# Patient Record
Sex: FEMALE | Race: WHITE | NOT HISPANIC OR LATINO | Employment: OTHER | URBAN - METROPOLITAN AREA
[De-identification: names, ages, dates, MRNs, and addresses within clinical notes are randomized per-mention and may not be internally consistent; named-entity substitution may affect disease eponyms.]

---

## 2018-06-13 ENCOUNTER — HOSPITAL ENCOUNTER (OUTPATIENT)
Dept: PREADMISSION TESTING | Facility: OTHER | Age: 58
Discharge: HOME | End: 2018-06-13
Attending: ORTHOPAEDIC SURGERY
Payer: COMMERCIAL

## 2018-06-13 VITALS
BODY MASS INDEX: 31.65 KG/M2 | HEIGHT: 65 IN | SYSTOLIC BLOOD PRESSURE: 143 MMHG | DIASTOLIC BLOOD PRESSURE: 67 MMHG | TEMPERATURE: 98 F | WEIGHT: 190 LBS | OXYGEN SATURATION: 97 % | HEART RATE: 66 BPM

## 2018-06-13 RX ORDER — SODIUM CHLORIDE, SODIUM LACTATE, POTASSIUM CHLORIDE, CALCIUM CHLORIDE 600; 310; 30; 20 MG/100ML; MG/100ML; MG/100ML; MG/100ML
INJECTION, SOLUTION INTRAVENOUS CONTINUOUS
Status: CANCELLED | OUTPATIENT
Start: 2018-06-13

## 2018-06-13 RX ORDER — CEFDINIR 300 MG/1
600 CAPSULE ORAL DAILY
COMMUNITY
End: 2018-07-03 | Stop reason: ALTCHOICE

## 2018-06-13 RX ORDER — AZELASTINE 1 MG/ML
1 SPRAY, METERED NASAL 2 TIMES DAILY
COMMUNITY
End: 2018-07-20 | Stop reason: SDUPTHER

## 2018-06-13 RX ORDER — PREDNISONE 20 MG/1
20 TABLET ORAL DAILY
COMMUNITY
End: 2018-07-03

## 2018-06-13 RX ORDER — LIDOCAINE HYDROCHLORIDE 10 MG/ML
0.5 INJECTION, SOLUTION EPIDURAL; INFILTRATION; INTRACAUDAL; PERINEURAL ONCE
Status: CANCELLED | OUTPATIENT
Start: 2018-06-13 | End: 2018-06-13

## 2018-06-13 RX ORDER — FLUTICASONE FUROATE AND VILANTEROL 100; 25 UG/1; UG/1
1 POWDER RESPIRATORY (INHALATION) DAILY
COMMUNITY
End: 2018-08-13

## 2018-06-13 RX ORDER — MELOXICAM 15 MG/1
15 TABLET ORAL NIGHTLY
COMMUNITY
End: 2018-07-03

## 2018-06-13 RX ORDER — TRIAMCINOLONE ACETONIDE 55 UG/1
2 SPRAY, METERED NASAL NIGHTLY
COMMUNITY
End: 2019-01-22

## 2018-06-13 RX ORDER — PREGABALIN 75 MG/1
75 CAPSULE ORAL ONCE
Status: CANCELLED | OUTPATIENT
Start: 2018-06-13 | End: 2018-06-13

## 2018-06-13 RX ORDER — OMEPRAZOLE 40 MG/1
40 CAPSULE, DELAYED RELEASE ORAL DAILY
COMMUNITY
End: 2018-07-03

## 2018-06-13 RX ORDER — MONTELUKAST SODIUM 10 MG/1
10 TABLET ORAL NIGHTLY
COMMUNITY
End: 2018-08-13

## 2018-06-13 NOTE — PRE ADMISSION SCREENING
Pt reports went to urgent care over weekend with productive cough and shortness of breath.  Has been on antibiotic, prednisone and was unable to lie flat last night secondary to shortness of breath.  Dr. Elliott, anesthesia, aware.  Recommend patient see her pulmonologist and postpone surgery.  Message left on Dr. Stone' nurse voicemail informing of above.

## 2018-06-13 NOTE — DISCHARGE INSTRUCTIONS
PRE-ADMIT TESTING -  [phone]    [address]  [place] [facility]          Your surgery has been scheduled at [facility]. We are pleased to have the opportunity to serve you. For Further Information please call [phone].    On the day of surgery please report to the Information Desk on the 1st floor.    · CONTACT YOUR PHYSICIAN'S OFFICE THE DAY PRIOR TO YOUR SURGERY TO OBTAIN YOUR ARRIVAL TIME.     · The evening before surgery do not eat anything after 9 p.m. ( this includes hard candy, chewing gum and mints).  You may only have GATORADE, POWERADE AND WATER  from 9 p.m. until you leave your home.   DO NOT DRINK ANY LIQUIDS ON THE WAY TO THE HOSPITAL.      SPECIAL MEDICATION INSTRUCTIONS: TAKE medications checked off by the Anesthesiologist on your Medication List.    Angiogram Patients: Take medications as instructed by your physician, including aspirin.     Surgery Patients:    If you take ASPIRIN - Your PHYSICIAN/SURGEON will need to inform you IF/OR when you need to stop taking aspirin prior to your surgery.     Do Not take any medications containing IBUPROFEN.  Do Not Wear any make-up or dark nail polish   (especially eye make-up) to surgery. If you come to surgery with makeup on you will be required to remove the makeup or nail polish.    Do not shave your surgical area at least 5 days prior to your surgery. The surgical prep will be performed at the hospital according to Infection Control regulations.    Leave all valuables at home.   Do Not wear any jewelry or watches, including any metal in body piercings.  Contact Lens must be removed before surgery. Either do not wear the contact lens or bring a case and solution for storage.  Please bring a container for eyeglasses or dentures as required.  Bring any paperwork your physician has provided, such as consent forms,  history and physicals, doctor's orders, etc.   Bring comfortable clothes that are loose fitting to wear upon  discharge. Take into consideration the type of surgery being performed.  Maintain your diet as advised per your physician the day prior to surgery.      Adequate rest the night before surgery is advised.   Park in the Parking lot behind the hospital or in the [facility] across the street from the parking lot. Parking is complimentary.  If you will be discharged the same day as your procedure, please arrange for a responsible adult to drive you home or to accompany you if traveling by taxi.   YOU WILL NOT BE PERMITTED TO DRIVE OR TO LEAVE THE HOSPITAL ALONE AFTER SURGERY.   It is strongly recommended that you arrange for someone to remain with you for the first 24 hrs following your surgery.       Thank you for your cooperation.  The Staff of [facility].        Bathing Instructions                                                                 Please shower the evening before and morning of your procedure with    ANTIBACTERIAL SOAP. ( DIAL, etc )  Concentrate on the surgical area   for at least 3 minutes and rinse completely. Dry off as usual.   Do not use any deodorant, powder, body lotions, perfume, after shave or    cologne.

## 2018-07-03 ENCOUNTER — OFFICE VISIT (OUTPATIENT)
Dept: ALLERGY | Facility: CLINIC | Age: 58
End: 2018-07-03
Payer: COMMERCIAL

## 2018-07-03 VITALS
BODY MASS INDEX: 34.46 KG/M2 | SYSTOLIC BLOOD PRESSURE: 122 MMHG | HEIGHT: 65 IN | WEIGHT: 206.81 LBS | DIASTOLIC BLOOD PRESSURE: 70 MMHG

## 2018-07-03 DIAGNOSIS — R49.0 HOARSENESS: ICD-10-CM

## 2018-07-03 DIAGNOSIS — R06.00 DYSPNEA AND RESPIRATORY ABNORMALITIES: Primary | ICD-10-CM

## 2018-07-03 DIAGNOSIS — R06.89 DYSPNEA AND RESPIRATORY ABNORMALITIES: Primary | ICD-10-CM

## 2018-07-03 DIAGNOSIS — J31.0 CHRONIC RHINITIS: ICD-10-CM

## 2018-07-03 PROCEDURE — 99244 OFF/OP CNSLTJ NEW/EST MOD 40: CPT | Mod: ,,, | Performed by: ALLERGY & IMMUNOLOGY

## 2018-07-03 RX ORDER — PANTOPRAZOLE SODIUM 40 MG/1
40 TABLET, DELAYED RELEASE ORAL DAILY
COMMUNITY
End: 2018-07-12 | Stop reason: SDUPTHER

## 2018-07-03 NOTE — LETTER
July 3, 2018        Tram Bill MD  [address]  [address]  [place] [address]             [facility]  [address]  [address]  [place] [address]  Phone: [phone]  Fax: [phone]   Patient: Valdez Salazar   MR Number: [id number]   YOB: 1960   Date of Visit: 7/3/2018       Dear Dr. Bill:    Thank you for referring Valdez Salazar to me for evaluation. Attached you will find relevant portions of my assessment and plan of care.    If you have questions, please do not hesitate to call me. I look forward to following Valdez Salazar along with you.    Sincerely,      Arabella Kelly MD            CC  No Recipients    Enclosure

## 2018-07-03 NOTE — PROGRESS NOTES
"Subjective:       Patient ID: Valdez Salazar is a 58 y.o. female.    Chief Complaint: Sinus Problem (mucus in her throat, lost her voice about 4 weeks ago, and it has not come back yet) and Cough (coughs up mucus that is clear to white and thick)    HPI       Pt presents as a consult from Dr. Bill for possible allergic rhinitis etiology contributing towards cough and mucus production.     She is seeing Dr. Hart and Dr. Farrar as well.     She tried omeprazole 40 mg BID, but Dr. farrar took her off this regimen.   He placed her on protonix 40 mg once per day. Thus far, no improvement.     Dr. Hart performed scope and dx with LPR and sent her to GI, Dr. Farrar. No vocal cord lesions at that time.     Her main symptoms are coughing spells  They can be bad enough to where she thought she may go to the ED, last bad episode was Saturday  Rescue inhaler may help may not , not a consistent efficacy.   She has been having these symptoms for the past year.   Her voice hoarseness has been 4 weeks.   She thinks it started around the same time as when she started the breo.   She has to sound like "minni mouse"  To get sound out.   Since she's been out of [facility], her symptoms have been bad.   mucinex helps with thickness of mucus.   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     She has been on an antibiotic but isn't unsure of the name or type.   - early June. It helped the "color " of the mucus but hasn't had improvement in sx.     Ct chest reviewed and no significant findings.   Sinus ct in 2015 reviewed and no significant findings.      Pt isn't sure if source of mucus is post nasal drip or not. Reflux medication hasn't been helping.     Pt is currently nasacort 2 sen qhs. Has rx for azelastine.     Would like to pursue skin prick testing.       Atopic history  Asthma: no history - but had a methacholine challenge- per patient report positive.   - she is currently on breo 200 1 puff daily. " "  Clearing mucus helps her breathe.   Rhinitis: see above  Dysphagia: at times food gets stuck- see Dr. Marroquin for reflux  Food allergy: none   Oral allergy: none       Review of Systems      General: neg unexpected weight changes, fevers, chills, night sweats, malaise  HEENT: see hpi, Neg eye pain, vision changes, ear drainage, nose bleeds, throat tightness, sores in the mouth  CV: Neg chest pain, palpitations, swelling  Resp: see hpi, neg shortness of breath, hemoptysis  GI: see hpi, neg dysphagia, night abdominal pain, chronic diarrhea, chronic constipation  Derm: See Hpi, neg new rash, neg flushing  Mu/sk: Neg joint pain, joint swelling   Psych: Neg anxiety  neuro: neg chronic headaches, muscle weakness  Endo: neg heat/cold intolerance, chronic fatigue    Objective:     Vitals:    07/03/18 1048   BP: 122/70   Weight: 93.8 kg (206 lb 12.8 oz)   Height: 5' 5" (1.651 m)        Physical Exam      General: no acute distress, well developed well nourished   HEENT:   Head:normocephalic atraumatic  Eyes: YOU, EOMI, Neg injection, scleral icterus, or conjunctival papillary hypertrophy.  Ears: tm clear bilaterally, normal canal  Nose: 2-3+ inferior turbinates pink, neg nasal polyps            Mucosa: dry and red            Septal irritation: bilateral redness   OP: mucus membranes moist, - cobblestoning, - PND, neg erythema or lesions  Neck: supple, Full range of motion, neg lymphadenopathy  Chest: full respiratory excursion no abnormal chest abnormality  Resp: clear to ascultation bilaterally  CV: RRR, neg MRG, brisk capillary refill  Abdomen: BS+, non tender, non distended  Ext:  Neg clubbing, cyanosis, pitting edema  Skin: Neg rashes or lesions  Lymph: neg supraclavicular, axillary     Assessment:       1. Dyspnea and respiratory abnormalities    2. Chronic rhinitis    3. Hoarseness        Plan:       Dyspnea and respiratory abnormalities    Chronic rhinitis    Hoarseness       Hoarseness  - possible due to powder " device like breo - which can cause dysphonia  - stop breo, start symbicort 80 mcg 1 puff bid - sample given today. MDI delivery may be better tolerated or prodrug alvesco.   - consider magic mouthwash to help with throat irritation.     Chronic rhinitis  - start saling, nasacort, azelastine routinely   Instruction given - pt has rx already  - technique reviewed today   - skin prick test to inhalants if above is not helpful.     Dyspnea- reflux being treated, feels mucus is more of the trigger  - try to limit pnd as a source  - consider spiriva   - continue lpr management per gi   - pt to continue pulmonary management- positive methacholine challenge       Follow up in 4 weeks         Arabella Kelly M.D.  Allergy/Immunology  [facility]   [phone] phone  [phone] fax

## 2018-07-09 ENCOUNTER — TELEPHONE (OUTPATIENT)
Dept: ALLERGY | Facility: CLINIC | Age: 58
End: 2018-07-09

## 2018-07-09 NOTE — TELEPHONE ENCOUNTER
I would advise having her  a sample of spiriva, If we have any, to see If this will help. If it doesn't help we can try to move up her appointment to discuss in clinic.

## 2018-07-09 NOTE — TELEPHONE ENCOUNTER
Patient called and stated that she would like for you to call her in regards to her having no relief with the thick mucus that she saw you for last Tuesday. She does state that her voice is returning but she doesn't think that the medication is helping with the mucus. Please advise.

## 2018-07-12 ENCOUNTER — OFFICE VISIT (OUTPATIENT)
Dept: ALLERGY | Facility: CLINIC | Age: 58
End: 2018-07-12
Payer: COMMERCIAL

## 2018-07-12 VITALS
BODY MASS INDEX: 34.38 KG/M2 | DIASTOLIC BLOOD PRESSURE: 70 MMHG | SYSTOLIC BLOOD PRESSURE: 124 MMHG | HEIGHT: 65 IN | WEIGHT: 206.38 LBS

## 2018-07-12 DIAGNOSIS — R49.0 HOARSENESS: ICD-10-CM

## 2018-07-12 DIAGNOSIS — R06.89 DYSPNEA AND RESPIRATORY ABNORMALITIES: Primary | ICD-10-CM

## 2018-07-12 DIAGNOSIS — R13.19 ESOPHAGEAL DYSPHAGIA: ICD-10-CM

## 2018-07-12 DIAGNOSIS — R06.00 DYSPNEA AND RESPIRATORY ABNORMALITIES: Primary | ICD-10-CM

## 2018-07-12 DIAGNOSIS — R05.3 CHRONIC COUGH: ICD-10-CM

## 2018-07-12 DIAGNOSIS — K21.9 LARYNGOPHARYNGEAL REFLUX (LPR): ICD-10-CM

## 2018-07-12 DIAGNOSIS — J31.0 CHRONIC RHINITIS: ICD-10-CM

## 2018-07-12 PROCEDURE — 99215 OFFICE O/P EST HI 40 MIN: CPT | Mod: ,,, | Performed by: ALLERGY & IMMUNOLOGY

## 2018-07-12 PROCEDURE — 3008F BODY MASS INDEX DOCD: CPT | Mod: ,,, | Performed by: ALLERGY & IMMUNOLOGY

## 2018-07-12 RX ORDER — PANTOPRAZOLE SODIUM 40 MG/1
40 TABLET, DELAYED RELEASE ORAL 2 TIMES DAILY
Qty: 60 TABLET | Refills: 3 | Status: SHIPPED | OUTPATIENT
Start: 2018-07-12 | End: 2019-01-22

## 2018-07-12 NOTE — PROGRESS NOTES
"Subjective:       Patient ID: Valdez Salazar is a 58 y.o. female.    Chief Complaint: Cough (still present and thick mucus still present as well )    HPI       Pt presents for chronic cough.    Other doctors in tx: Dr. Hart and Dr. Farrar as well.     In the past she has tried omeprazole 40 mg BID, but Dr. farrar took her off this regimen.   He placed her on protonix 40 mg once per day. Thus far, no improvement.     Dr. Hart performed scope and dx with LPR and sent her to GI, Dr. Farrar. No vocal cord lesions at that time.     Cough:  Condition:   Sx: coughing spells, voice hoarseness 4 weeks- thick and sticky mucus.   Intensity: They can be bad enough to where she may go to the ED  Tx: nasacort 2 sen qhs, started symbicort 80 1 puff bid, azelastine, spiriva 2 puff qday-sample- wants rx, mucinex D.    -Rescue inhaler may or may not help, not a consistent efficacy. mucinex helps with thickness of mucus.   -She has been on an antibiotic but isn't unsure of the name or type.   - early June. It helped the "color " of the mucus but hasn't had improvement in sx.   Trigger: when she eats, more mucus comes and she coughs. Doesn't matter if it's water or food.     Duration: She has been having these symptoms for the past year.   She thinks voice hoarseness started around the same time as when she started the breo. Two days after the breo, her voice came back.   Of note, Since she's been out of [facility], her symptoms have been bad.   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       Pt states she is confused on how to take her medications and would like guidance on how to do so.     Imaging:  Ct chest reviewed and no significant findings.   Sinus ct in 2015 reviewed and no significant findings.    Discussed possibly repeating as it has been 3 years since last scan.     Pt isn't sure if source of mucus is post nasal drip or not. Reflux medication hasn't been helping per her perception.  Her story changes " "as the trigger uncertain to accurate historical accuracy. It ranged from ranitidine and nasonex and laying down.      Atopic history  Asthma: no history - but had a methacholine challenge- per patient report positive.   - stopped breo due to dysphonia. Now on symbicort 80 1 puff bid and montelukast   Rhinitis: see above  Dysphagia: at times food gets stuck- see  Cara for reflux- needs f/u  Food allergy: none   Oral allergy: none       Review of Systems      General: neg unexpected weight changes, fevers, chills, night sweats, malaise  HEENT: see hpi, Neg eye pain, vision changes, ear drainage, nose bleeds, throat tightness, sores in the mouth  CV: Neg chest pain, palpitations, swelling  Resp: see hpi, neg shortness of breath, hemoptysis  GI: see hpi, neg night abdominal pain, chronic diarrhea, chronic constipation  Derm: See Hpi, neg new rash, neg flushing  Mu/sk: Neg joint pain, joint swelling   Psych: Neg anxiety  neuro: neg chronic headaches, muscle weakness  Endo: neg heat/cold intolerance, chronic fatigue    Objective:     Vitals:    07/12/18 0846   BP: 124/70   Weight: 93.6 kg (206 lb 6.4 oz)   Height: 5' 5" (1.651 m)        Physical Exam      General: no acute distress, well developed well nourished       Assessment:       1. Dyspnea and respiratory abnormalities    2. Chronic cough    3. Chronic rhinitis    4. Hoarseness    5. Esophageal dysphagia    6. Laryngopharyngeal reflux (LPR)        Plan:       Dyspnea and respiratory abnormalities  -     tiotropium bromide (SPIRIVA RESPIMAT) 1.25 mcg/actuation Mist; Inhale 2 puffs into the lungs once daily. Controller  Dispense: 4 g; Refill: 4    Chronic cough  -     diphenhydrAMINE-aluminum-magnesium hydroxide-simethicone-lidocaine HCl 2%; Swish and spit 3 mLs every 4 (four) hours as needed.  Dispense: 300 mL; Refill: 3    Chronic rhinitis    Hoarseness    Esophageal dysphagia    Laryngopharyngeal reflux (LPR)  -     pantoprazole (PROTONIX) 40 MG tablet; Take " 1 tablet (40 mg total) by mouth 2 (two) times daily.  Dispense: 60 tablet; Refill: 3       Hoarseness  - improving  - symbicort 80 mcg 1 puff bid - MDI delivery may be better tolerated or consider prodrug alvesco.   - start magic mouthwash to help with throat irritation.     Chronic rhinitis  - saline, nasacort, azelastine routinely - increase amt of times per day  Instruction given -   - technique reviewed today   - skin prick test to inhalants if above is not helpful.   - start plain mucinex 1200 mg bid- discussed D or pseudophed has risk to increase her BP.   Limit the amount of dairy as it may increase mucus production.     Dyspnea- reflux being treated, feels mucus is more of the trigger  - try to limit pnd as a source  - continue 2 puffs spiriva qday- rx sent today  - continue lpr management per gi   - pt to continue pulmonary management- positive methacholine challenge   - trial off montelukast to simplify her medication routine.     Lpr/dysphagia  - may be a poor perceiver of symptoms  - continue ppi - stop ranitidine , increase PPI pantoprazole 40 mg to BID for 4 weeks then qday.   - may need to have esophagus stretched.       Follow up in 4 weeks     We discussed at length her medication routine, ways to simplify her regimen. My suspicion is that this is silent reflux and post nasal drip and not much lower airway contributing. Ct sinuses if not better and skin prick test to inhalants. Pt will see Dr. Corbin for pulmonary care in August.   > 50% of visit face to face > 40 mins         Arabella Kelly M.D.  Allergy/Immunology  [facility]   [phone] phone  [phone] fax

## 2018-07-12 NOTE — PATIENT INSTRUCTIONS
Mucus:  Drink plenty of water   Plain mucinex is better for blood pressure and risk - 600 mg twice per day or 1200 mg twice per day   Let's try stopping ranitidine at night    Let's try pantoprazole 40 mg twice per day for 4 weeks then back to once per day.  - when you take the medication, make sure that you are without coffee or food for 40 mins then eat breakfast and dinner.     Nasal regimen:  Use saline 1-2 times per day or more often if needed to keep the nose clear   You may use azelastine 4 times per day.   For more often relief of congestion- may try sinus buster or over the counter capsaicin nasal spray medicine. Use as needed for congestion.   Use the nasonex in the morning 2 sprays.     May stop the montelukast if you would like.     Let's try magic mouth wash 3 ml every 3 hours as needed for cough  Swish and spit.     Limit the amount of milk and dairy that you drink as this may increase mucus production.     spiriva 2 puff once per day or 1 puff twice per day with symbicort 1 puff twice per day.

## 2018-07-19 ENCOUNTER — TELEPHONE (OUTPATIENT)
Dept: ALLERGY | Facility: CLINIC | Age: 58
End: 2018-07-19

## 2018-07-19 NOTE — TELEPHONE ENCOUNTER
Patient called and stated that she is out of the Symbicort sample that you had given her, she was wondering if you could send an order to pharmacy for her to . She states that it was Symbicort 80. Please advise.

## 2018-07-20 RX ORDER — AZELASTINE 1 MG/ML
SPRAY, METERED NASAL
Qty: 90 ML | Refills: 4 | Status: SHIPPED | OUTPATIENT
Start: 2018-07-20 | End: 2023-04-06

## 2018-08-13 ENCOUNTER — OFFICE VISIT (OUTPATIENT)
Dept: ALLERGY | Facility: CLINIC | Age: 58
End: 2018-08-13
Payer: COMMERCIAL

## 2018-08-13 ENCOUNTER — OFFICE VISIT (OUTPATIENT)
Dept: PULMONOLOGY | Facility: CLINIC | Age: 58
End: 2018-08-13
Payer: COMMERCIAL

## 2018-08-13 VITALS
BODY MASS INDEX: 34.82 KG/M2 | OXYGEN SATURATION: 97 % | HEART RATE: 64 BPM | WEIGHT: 209 LBS | DIASTOLIC BLOOD PRESSURE: 72 MMHG | HEIGHT: 65 IN | SYSTOLIC BLOOD PRESSURE: 132 MMHG

## 2018-08-13 VITALS
HEART RATE: 64 BPM | DIASTOLIC BLOOD PRESSURE: 78 MMHG | HEIGHT: 65 IN | BODY MASS INDEX: 34.82 KG/M2 | SYSTOLIC BLOOD PRESSURE: 140 MMHG | WEIGHT: 209 LBS | OXYGEN SATURATION: 96 %

## 2018-08-13 DIAGNOSIS — J45.909 ASTHMA, UNSPECIFIED ASTHMA SEVERITY, UNSPECIFIED WHETHER COMPLICATED, UNSPECIFIED WHETHER PERSISTENT: Primary | ICD-10-CM

## 2018-08-13 DIAGNOSIS — K21.9 LARYNGOPHARYNGEAL REFLUX (LPR): ICD-10-CM

## 2018-08-13 DIAGNOSIS — R05.3 CHRONIC COUGH: ICD-10-CM

## 2018-08-13 DIAGNOSIS — J31.0 CHRONIC RHINITIS: ICD-10-CM

## 2018-08-13 DIAGNOSIS — R06.00 DYSPNEA AND RESPIRATORY ABNORMALITIES: Primary | ICD-10-CM

## 2018-08-13 DIAGNOSIS — R06.89 DYSPNEA AND RESPIRATORY ABNORMALITIES: Primary | ICD-10-CM

## 2018-08-13 DIAGNOSIS — R13.19 ESOPHAGEAL DYSPHAGIA: ICD-10-CM

## 2018-08-13 DIAGNOSIS — R49.0 HOARSENESS: ICD-10-CM

## 2018-08-13 PROCEDURE — S8110 PEAK EXPIRATORY FLOW RATE (P: HCPCS | Mod: ,,, | Performed by: ALLERGY & IMMUNOLOGY

## 2018-08-13 PROCEDURE — 3008F BODY MASS INDEX DOCD: CPT | Mod: ,,, | Performed by: INTERNAL MEDICINE

## 2018-08-13 PROCEDURE — 3008F BODY MASS INDEX DOCD: CPT | Mod: ,,, | Performed by: ALLERGY & IMMUNOLOGY

## 2018-08-13 PROCEDURE — 99214 OFFICE O/P EST MOD 30 MIN: CPT | Mod: ,,, | Performed by: ALLERGY & IMMUNOLOGY

## 2018-08-13 PROCEDURE — 99204 OFFICE O/P NEW MOD 45 MIN: CPT | Mod: ,,, | Performed by: INTERNAL MEDICINE

## 2018-08-13 RX ORDER — BUDESONIDE AND FORMOTEROL FUMARATE DIHYDRATE 80; 4.5 UG/1; UG/1
2 AEROSOL RESPIRATORY (INHALATION) 2 TIMES DAILY
Refills: 0 | COMMUNITY
Start: 2018-07-20 | End: 2018-09-04 | Stop reason: ALTCHOICE

## 2018-08-13 RX ORDER — ALPRAZOLAM 1 MG/1
TABLET ORAL
Refills: 0 | COMMUNITY
Start: 2018-07-03 | End: 2019-01-22

## 2018-08-13 RX ORDER — METHYLPREDNISOLONE 4 MG/1
TABLET ORAL
Refills: 0 | COMMUNITY
Start: 2018-08-07 | End: 2018-11-13

## 2018-08-13 NOTE — PROGRESS NOTES
"New Office Visit/Consultation Note    Patient Name: Valdez Salazar  MRN: [id number]  : 1960      Reason for visit: Asthma    HPI:     2018 - Pt with h/o asthma ( has seen Dr Bill in past), has trouble with dyspnea.  Has methacholine challenge test which was "positive".  Was on singulair and BREO, still had difficulties and "thick mucus".  Has trouble when reclining.  Has seen GI to evaluate for reflux ( had esophageal "stretched").  Was referred to Dr Kelly for evaluation and now here to establish care.  Hutchins also seen Dr Hart for ENT evaluation.  Had medication changes done by dr Kelly and feels better overall.    Past Medical History    Past Medical History:   Diagnosis Date    Acid reflux     Allergic sinusitis     Asthma     PONV (postoperative nausea and vomiting)        Past Surgical History    Past Surgical History:   Procedure Laterality Date    ADENOIDECTOMY      cesarian section      ELBOW SURGERY      and hardware removal    HYSTERECTOMY      INCONTINENCE SURGERY      KNEE ARTHROSCOPY      rectocele      TONSILLECTOMY         Medications      Current Outpatient Medications:     acetaminophen (TYLENOL) 500 MG tablet, Take 500 mg by mouth every 6 (six) hours as needed for Pain., Disp: , Rfl:     ALPRAZolam (XANAX) 1 MG tablet, USE AS DIRECTED, Disp: , Rfl: 0    azelastine (ASTELIN) 137 mcg (0.1 %) nasal spray, 1 spray per nare twice per day, may use up to 4 times daily 1 spray per nare for congestion and post nasal drip, Disp: 90 mL, Rfl: 4    diphenhydrAMINE-aluminum-magnesium hydroxide-simethicone-lidocaine HCl 2%, Swish and spit 3 mLs every 4 (four) hours as needed., Disp: 300 mL, Rfl: 3    methylPREDNISolone (MEDROL DOSEPACK) 4 mg tablet, TAKE 6 TABLETS ON DAY 1 AS DIRECTED ON PACKAGE AND DECREASE BY 1 TAB EACH DAY FOR A TOTAL OF 6 DAYS, Disp: , Rfl: 0    pantoprazole (PROTONIX) 40 MG tablet, Take 1 tablet (40 mg total) by mouth 2 (two) times daily., Disp: 60 tablet, " Rfl: 3    SYMBICORT 80-4.5 mcg/actuation HFAA, Inhale 2 puffs into the lungs 2 (two) times daily., Disp: , Rfl: 0    tiotropium bromide (SPIRIVA RESPIMAT) 1.25 mcg/actuation Mist, Inhale 2 puffs into the lungs once daily. Controller, Disp: 4 g, Rfl: 4    triamcinolone (NASACORT) 55 mcg nasal inhaler, 2 sprays by Nasal route every evening., Disp: , Rfl:     Allergies    Review of patient's allergies indicates:   Allergen Reactions    Codeine Nausea And Vomiting    Stadol [butorphanol tartrate] Shortness Of Breath     Pt reports stops breathing    Wiley-dur Shortness Of Breath     Stops breathing    Morphine Nausea And Vomiting     Severe.      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   HENT: Positive for congestion and nosebleeds.    Eyes: Negative for pain.   Respiratory: Positive for cough, shortness of breath and wheezing. Negative for hemoptysis, sputum production and stridor.    Cardiovascular: Negative for chest pain, palpitations, orthopnea, claudication, leg swelling and PND.   Gastrointestinal: Positive for heartburn. Negative for abdominal pain, constipation, diarrhea, nausea and vomiting.   Genitourinary: Negative for dysuria, frequency and urgency.   Musculoskeletal: Negative for falls and myalgias.   Skin: Negative for itching and rash.   Neurological: Negative for sensory change, focal weakness and weakness.  "  Psychiatric/Behavioral: Negative for depression. The patient is not nervous/anxious.        Physical Exam    Vitals:    08/13/18 0952   BP: (!) 140/78   Pulse: 64   SpO2: 96%   Weight: 94.8 kg (209 lb)   Height: 5' 5" (1.651 m)       Physical Exam   Constitutional: She is oriented to person, place, and time. She appears well-developed and well-nourished. No distress.   HENT:   Head: Normocephalic and atraumatic.   Right Ear: External ear normal.   Left Ear: External ear normal.   Nose: Nose normal.   Mouth/Throat: Oropharynx is clear and moist.   + minimal post nasal drip   Eyes: Conjunctivae and EOM are normal. Pupils are equal, round, and reactive to light.   Neck: Normal range of motion. Neck supple. No JVD present. No tracheal deviation present. No thyromegaly present.   Cardiovascular: Normal rate, regular rhythm, normal heart sounds and intact distal pulses. Exam reveals no gallop and no friction rub.   No murmur heard.  Pulmonary/Chest: Effort normal and breath sounds normal. No stridor. No respiratory distress. She has no wheezes. She has no rales. She exhibits no tenderness.   Abdominal: Soft. Bowel sounds are normal. She exhibits no distension. There is no tenderness.   Musculoskeletal: Normal range of motion. She exhibits no edema or tenderness.   Lymphadenopathy:     She has no cervical adenopathy.   Neurological: She is alert and oriented to person, place, and time. No cranial nerve deficit.   Skin: Skin is warm and dry. She is not diaphoretic.   Psychiatric: She has a normal mood and affect. Her behavior is normal.   Nursing note and vitals reviewed.      Labs    Lab Results   Component Value Date    WBC 7.7 07/29/2013    HGB 12.4 07/29/2013    HCT 38.2 07/29/2013     07/29/2013       Sodium   Date Value Ref Range Status   07/29/2013 141 136 - 145 mmol/L Final     Potassium   Date Value Ref Range Status   07/29/2013 3.8 3.5 - 5.1 mmol/L Final     Chloride   Date Value Ref Range Status "   07/29/2013 106 95 - 110 mmol/L Final     CO2   Date Value Ref Range Status   07/29/2013 22 (L) 23 - 29 mmol/L Final     BUN, Bld   Date Value Ref Range Status   07/29/2013 20 6 - 20 mg/dL Final     Creatinine   Date Value Ref Range Status   07/29/2013 0.7 0.5 - 1.4 mg/dL Final     Calcium   Date Value Ref Range Status   07/29/2013 9.1 8.7 - 10.5 mg/dL Final     Total Protein   Date Value Ref Range Status   07/29/2013 6.7 6.0 - 8.4 g/dL Final     ALBUMIN   Date Value Ref Range Status   07/29/2013 3.4 (L) 3.5 - 5.2 g/dL Final     Alkaline Phosphatase   Date Value Ref Range Status   07/29/2013 79 55 - 135 U/L Final     AST   Date Value Ref Range Status   07/29/2013 17 10 - 40 U/L Final     ALT   Date Value Ref Range Status   07/29/2013 18 10 - 44 U/L Final     Anion Gap   Date Value Ref Range Status   07/29/2013 13 5 - 15 meq/L Final       Xrays        Impression/Plan    Problem List Items Addressed This Visit        ENT    Chronic rhinitis  - continue present treatments  - will review Dr Hart's records  - ? CT sinuses  - ? Formal allergy testing       Pulmonary    Chronic cough  - multifactorial       GI    Laryngopharyngeal reflux (LPR)  - being treated      Other Visit Diagnoses     Asthma, unspecified asthma severity, unspecified whether complicated, unspecified whether persistent  - increase SYMBICORT  - continue prn beta agonist  - felt symptoms were worse with SINGULAIR  - request Dr Bill records  -may need repeat spirometry  - check labs (see below)- RTC 3  Months  To call me in 2-3 weeks to see in increased meds has helped    Relevant Orders    IgE    Vitamin D    CBC auto differential          I have spent about 45 minutes with the patient taking the history and examining the patient.  We have discussed the diagnoses and current plan and all questions have been answered.  We have discussed the follow up plan.  The patient and family (if present) know to contact the office with any questions they may  have.        Thee Corbin MD

## 2018-08-13 NOTE — PROGRESS NOTES
"Subjective:       Patient ID: Valdez Salazar is a 58 y.o. female.    Chief Complaint: Cough    HPI       Pt presents for chronic cough.    Other doctors in tx: Dr. Hart and Dr. Farrar as well. Today saw Dr. Corbin.     In the past she has tried omeprazole 40 mg BID, but Dr. farrar took her off this regimen.   He placed her on protonix 40 mg once per day.   Dr. Hart performed scope and dx with LPR and sent her to GI, Dr. Farrar. No vocal cord lesions at that time.     Cough:  Condition: improved   Sx: coughing spells, thick mucus- main issue is the thick mucus    Intensity: They can be bad enough to where she may go to the ED- however she is 90 % improved.   Tx: nasacort 2 sen qhs, started symbicort 80 1 puff bid- now improved but increased to 160 2 puffs twice per day by Dr. Corbin, azelastine- qid prn, spiriva 2 puff qday rx given 1 puff twice per day, mucinex D. Magic mouthwash qhs.  We had a trial off montelukast. She is on a medrol pack and finishing for orthopedics.    -Rescue inhaler may or may not help, not a consistent efficacy. mucinex-D helps with thickness of mucus. Discussed D can increase her BP.   -She has been on an antibiotic but isn't unsure of the name or type. - early June. It helped the "color " of the mucus but hasn't had improvement in sx.   Trigger: when she eats, more mucus comes and she coughs. Doesn't matter if it's water or food.   Protonix 40 mg given for BID x 4 weeks then once daily. Thinks that it helped with the mucus. BID was a better formulation than qday.     Duration: She has been having these symptoms for the past year.    Voice is still improved off breo.   Of note, Since she's been out of [facility], her symptoms have been bad.   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     Imaging:  Ct chest reviewed and no significant findings.   Sinus ct in 2015 reviewed and no significant findings.    Discussed possibly repeating as it has been 3 years since last " "scan.     Atopic history  Asthma: no history - but had a methacholine challenge- per patient report positive.   - stopped breo due to dysphonia. and montelukast was d/c. Now seeing Dr. Corbin.   Rhinitis: see above  Dysphagia: at times food gets stuck- see Dr. Sheltonor for reflux- needs f/u  Food allergy: none   Oral allergy: none       Review of Systems      General: neg unexpected weight changes, fevers, chills, night sweats, malaise  HEENT: see hpi, Neg eye pain, vision changes, ear drainage, nose bleeds, throat tightness, sores in the mouth  CV: Neg chest pain, palpitations, swelling  Resp: see hpi, neg shortness of breath, hemoptysis  GI: see hpi, neg night abdominal pain, chronic diarrhea, chronic constipation  Derm: See Hpi, neg new rash, neg flushing  Mu/sk: Neg joint pain, joint swelling   Psych: Neg anxiety  neuro: neg chronic headaches, muscle weakness  Endo: neg heat/cold intolerance, chronic fatigue    Objective:     Vitals:    08/13/18 1053   BP: 132/72   Pulse: 64   SpO2: 97%   Weight: 94.8 kg (209 lb)   Height: 5' 5" (1.651 m)   PF: 500 L/min        Physical Exam      General: no acute distress, well developed well nourished   HEENT:   Head:normocephalic atraumatic  Eyes: YOU, EOMI, Neg injection, scleral icterus, or conjunctival papillary hypertrophy.  Ears: tm clear bilaterally, normal canal  Nose: 2-3+ inferior turbinates pink, neg nasal polyps            Mucosa: mild dryness            Septal irritation: none   OP: mucus membranes moist, - cobblestoning, - PND, neg erythema or lesions  Neck: supple, Full range of motion, neg lymphadenopathy  Chest: full respiratory excursion no abnormal chest abnormality  Resp: clear to ascultation bilaterally  CV: RRR, neg MRG, brisk capillary refill  Abdomen: BS+, non tender, non distended  Ext:  Neg clubbing, cyanosis, pitting edema  Skin: Neg rashes or lesions  Lymph: neg supraclavicular, axillary       Assessment:       1. Dyspnea and respiratory " abnormalities    2. Chronic cough    3. Esophageal dysphagia    4. Laryngopharyngeal reflux (LPR)    5. Hoarseness        Plan:       Dyspnea and respiratory abnormalities    Chronic cough    Esophageal dysphagia    Laryngopharyngeal reflux (LPR)    Hoarseness       Hoarseness  - improving  - symbicort 80 mcg 1 puff bid changed to 160 2 puffs bid by Dr. Corbin - MDI delivery may be better tolerated or consider prodrug alvesco.  -continue magic mouthwash to help with throat irritation prn.     Chronic rhinitis- gave instructions for skin prick testing.   - saline, nasacort, azelastine routinely - increase amt of times per day  Instruction given -   - technique reviewed today   - skin prick test to inhalants if above is not helpful.   - start plain mucinex 1200 mg bid- discussed D or pseudophed has risk to increase her BP.   Limit the amount of dairy as it may increase mucus production.   Schedule inhalant- skin prick testing with food skin prick testing- if positive, consider ait- has 2 cats.     Dyspnea- reflux being treated, feels mucus is more of the trigger  - try to limit pnd as a source  - continue 2 puffs spiriva qday-  - continue lpr management per gi   - pt to continue pulmonary management- positive methacholine challenge   - trial off montelukast to simplify her medication routine.     Lpr/dysphagia - may be a poor perceiver of symptoms  - continue ppi - stop ranitidine , pantoprazole 40 mg to BID- needs to decrease to help curb potential SE.   - may need to have esophagus stretched.   Spicy foods are temporally related to increased mucus.   - food skin prick testing with inhalant on procedure visit if possible.        Follow up in 3 months, sooner if needed. Procedure visit for all foods and inhalants.           Arabella Kelly M.D.  Allergy/Immunology  [facility]   [phone] phone  [phone] fax

## 2018-08-13 NOTE — PATIENT INSTRUCTIONS
Nose:  Continue current regimen     Lungs:  symbicort 160 2 puffs twice per day   Continue spiriva 1 puff twice per day   Continue rescue inhaler as needed.     Throat:   Continue magic mouthwash   As needed gargle and spit or swallow.     Stomach:  Try once per day protonix  Try 40 mins before dinner  If worse, get back on the twice per day

## 2018-08-17 LAB
BASOPHILS NFR BLD: 0.1 K/UL (ref 0–0.2)
BASOPHILS NFR BLD: 0.8 %
EOSINOPHIL NFR BLD: 0.3 K/UL (ref 0–0.7)
EOSINOPHIL NFR BLD: 4.2 %
ERYTHROCYTE [DISTWIDTH] IN BLOOD BY AUTOMATED COUNT: 13.2 % (ref 11.7–14.9)
GRAN #: 3.8 K/UL (ref 1.4–6.5)
GRAN%: 50.3 %
HCT VFR BLD AUTO: 42.8 % (ref 36–48)
HGB BLD-MCNC: 13.7 G/DL (ref 12–15)
IMMATURE GRANS (ABS): 0 K/UL (ref 0–1)
IMMATURE GRANULOCYTES: 0.4 %
LYMPH #: 2.7 K/UL (ref 1.2–3.4)
LYMPH%: 35.6 %
MCH RBC QN AUTO: 29 PG (ref 25–35)
MCHC RBC AUTO-ENTMCNC: 32 G/DL (ref 31–36)
MCV RBC AUTO: 90.5 FL (ref 79–98)
MONO #: 0.7 K/UL (ref 0.1–0.6)
MONO%: 8.7 %
NUCLEATED RBCS: 0 %
PLATELET # BLD AUTO: 254 K/UL (ref 140–440)
PMV BLD AUTO: 10.6 FL (ref 8.8–12.7)
RBC # BLD AUTO: 4.73 M/UL (ref 3.5–5.5)
VITAMIN D, 1,25 (OH)2: 27.8 NG/ML (ref 30–100)
WBC # BLD AUTO: 7.6 K/UL (ref 5–10)

## 2018-08-21 LAB — IGE: <2 IU/ML (ref 0–100)

## 2018-09-04 ENCOUNTER — TELEPHONE (OUTPATIENT)
Dept: PULMONOLOGY | Facility: CLINIC | Age: 58
End: 2018-09-04

## 2018-09-04 DIAGNOSIS — R49.0 HOARSENESS: Primary | ICD-10-CM

## 2018-09-04 RX ORDER — BUDESONIDE AND FORMOTEROL FUMARATE DIHYDRATE 160; 4.5 UG/1; UG/1
2 AEROSOL RESPIRATORY (INHALATION) EVERY 12 HOURS
Qty: 1 INHALER | Refills: 11 | Status: SHIPPED | OUTPATIENT
Start: 2018-09-04 | End: 2019-10-06 | Stop reason: SDUPTHER

## 2018-09-05 RX ORDER — PANTOPRAZOLE SODIUM 40 MG/1
40 TABLET, DELAYED RELEASE ORAL 2 TIMES DAILY
Qty: 180 TABLET | Refills: 3 | Status: SHIPPED | OUTPATIENT
Start: 2018-09-05 | End: 2019-01-22

## 2018-09-12 DIAGNOSIS — R06.00 DYSPNEA AND RESPIRATORY ABNORMALITIES: Primary | ICD-10-CM

## 2018-09-12 DIAGNOSIS — R06.89 DYSPNEA AND RESPIRATORY ABNORMALITIES: Primary | ICD-10-CM

## 2018-09-12 RX ORDER — ALBUTEROL SULFATE 90 UG/1
2 AEROSOL, METERED RESPIRATORY (INHALATION)
Qty: 18 G | Refills: 6 | Status: SHIPPED | OUTPATIENT
Start: 2018-09-12 | End: 2019-10-26 | Stop reason: SDUPTHER

## 2018-10-16 ENCOUNTER — OFFICE VISIT (OUTPATIENT)
Dept: ALLERGY | Facility: CLINIC | Age: 58
End: 2018-10-16
Payer: COMMERCIAL

## 2018-10-16 VITALS — BODY MASS INDEX: 34.82 KG/M2 | WEIGHT: 209 LBS | HEIGHT: 65 IN

## 2018-10-16 DIAGNOSIS — K21.9 LARYNGOPHARYNGEAL REFLUX (LPR): ICD-10-CM

## 2018-10-16 DIAGNOSIS — R06.89 DYSPNEA AND RESPIRATORY ABNORMALITIES: ICD-10-CM

## 2018-10-16 DIAGNOSIS — R06.00 DYSPNEA AND RESPIRATORY ABNORMALITIES: ICD-10-CM

## 2018-10-16 DIAGNOSIS — B34.9 VIRAL ILLNESS: Primary | ICD-10-CM

## 2018-10-16 DIAGNOSIS — R13.19 ESOPHAGEAL DYSPHAGIA: ICD-10-CM

## 2018-10-16 PROCEDURE — 99214 OFFICE O/P EST MOD 30 MIN: CPT | Mod: ,,, | Performed by: ALLERGY & IMMUNOLOGY

## 2018-10-16 PROCEDURE — 3008F BODY MASS INDEX DOCD: CPT | Mod: ,,, | Performed by: ALLERGY & IMMUNOLOGY

## 2018-10-16 NOTE — PROGRESS NOTES
"Subjective:       Patient ID: Valdez Salazar is a 58 y.o. female.    Chief Complaint: Sinus Problem (possible sinus infection)    HPI       Pt presents for chronic cough.    Other doctors in tx: Dr. Hart; Dr. Farrar;  Dr. Corbin.       Pt states Wednesday , 7 days ago, she states she has PND and rhinorrhea that is green with "caramel" colored balls in the mucus. Her teeth also hurt on the right side and jaw and by her ear and her right temporal area. Tender in that area.   She is using her saline and both nasal sprays.   She hasn't been using magic mouth at night due to improved nocturnal cough.   She would notice large "brown clumps" during the day.   Mostly when she gets up in the morning.   Pt had chills over the weekend.   Her , started to have cough on Sunday.   Yesterday morning and this morning, symptoms are not getting worse.   Not quite better, but not getting worse.   Saturday and Sunday were her worse days.   She able to bake again.   She is having some joint pain. Cortisone in her heel.   Still using symbicort 160 2 puffs bid, spiriva 2 puffs qd day     Getting food stuck few days per week with breads.     Prior history   In the past she has tried omeprazole 40 mg BID, but Dr. farrar took her off this regimen.   He placed her on protonix 40 mg once per day.   Dr. Hart performed scope and dx with LPR and sent her to GI, Dr. Farrar. No vocal cord lesions at that time.   Cough:  Condition: improved   Sx: coughing spells, thick mucus- main issue is the thick mucus    Intensity: They can be bad enough to where she may go to the ED- however she is 90 % improved.   Tx: nasacort 2 sen qhs, started symbicort 80 1 puff bid- now improved but increased to 160 2 puffs twice per day by Dr. Corbin, azelastine- qid prn, spiriva 2 puff qday rx given 1 puff twice per day, mucinex D. Magic mouthwash qhs.  We had a trial off montelukast. She is on a medrol pack and finishing for orthopedics.    -Rescue " "inhaler may or may not help, not a consistent efficacy. mucinex-D helps with thickness of mucus. Discussed D can increase her BP.   -She has been on an antibiotic but isn't unsure of the name or type. - early June. It helped the "color " of the mucus but hasn't had improvement in sx.   Trigger: when she eats, more mucus comes and she coughs. Doesn't matter if it's water or food.   Protonix 40 mg given for BID x 4 weeks then once daily. Thinks that it helped with the mucus. BID was a better formulation than qday.     Duration: She has been having these symptoms for the past year.    Voice is still improved off breo.   Of note, Since she's been out of [facility], her symptoms have been bad.   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     Imaging:  Ct chest reviewed and no significant findings.   Sinus ct in 2015 reviewed and no significant findings.    Discussed possibly repeating as it has been 3 years since last scan.     Atopic history  Asthma: no history - but had a methacholine challenge- per patient report positive.   - stopped breo due to dysphonia. and montelukast was d/c. Now seeing Dr. Corbin.   Rhinitis: see above  Dysphagia: at times food gets stuck- see Dr. Marroquin for reflux- needs f/u  Food allergy: none   Oral allergy: none       Review of Systems      General: neg unexpected weight changes, fevers, chills, night sweats, malaise  HEENT: see hpi, Neg eye pain, vision changes, ear drainage, nose bleeds, throat tightness, sores in the mouth  CV: Neg chest pain, palpitations, swelling  Resp: see hpi, neg shortness of breath, hemoptysis  GI: see hpi, neg night abdominal pain, chronic diarrhea, chronic constipation  Derm: See Hpi, neg new rash, neg flushing  Mu/sk: Neg joint pain, joint swelling   Psych: Neg anxiety  neuro: neg chronic headaches, muscle weakness  Endo: neg heat/cold intolerance, chronic fatigue    Objective:     Vitals:    10/16/18 1324   Weight: 94.8 kg (209 lb)   Height: 5' " "5" (1.651 m)        Physical Exam      General: no acute distress, well developed well nourished   HEENT:   Head:normocephalic atraumatic  Eyes: YOU, EOMI, Neg injection, scleral icterus, or conjunctival papillary hypertrophy.  Ears: tm clear bilaterally, normal canal  Nose: 2-3+ inferior turbinates pink, neg nasal polyps            Mucosa: mild dryness            Septal irritation: right   OP: mucus membranes moist, - cobblestoning, - PND, neg erythema or lesions  Neck: supple, Full range of motion, neg lymphadenopathy  Chest: full respiratory excursion no abnormal chest abnormality  Resp: clear to ascultation bilaterally  CV: RRR, neg MRG, brisk capillary refill  Abdomen: BS+, non tender, non distended  Ext:  Neg clubbing, cyanosis, pitting edema  Skin: Neg rashes or lesions  Lymph: neg supraclavicular, axillary       Assessment:       1. Viral illness    2. Dyspnea and respiratory abnormalities    3. Laryngopharyngeal reflux (LPR)    4. Esophageal dysphagia        Plan:       Viral illness    Dyspnea and respiratory abnormalities  -     inhalat. spacing dev,sm. mask (AEROCHAMBER PLUS FLOW-VU,S MSK) Spcr; 1 Device by Misc.(Non-Drug; Combo Route) route as needed.  Dispense: 1 each; Refill: 3    Laryngopharyngeal reflux (LPR)    Esophageal dysphagia       Hoarseness  - improving  - symbicort 160 2 puffs bid  -continue magic mouthwash to help with throat irritation prn.     Chronic rhinitis- gave instructions for skin prick testing.   - saline, stop nasacort temporarily, start rhinocort aqua - 1-2 bid , continue azelastine routinely - increase amt of times per day  Instruction given -   - technique reviewed today    - skin prick test to inhalants if above is not helpful.   - continue plain mucinex 1200 mg bid- discussed D or pseudophed has risk to increase her BP.   Limit the amount of dairy as it may increase mucus production.   Schedule inhalant- skin prick testing with food skin prick testing- if positive, " consider ait- has 2 cats.  - not been skin prick tested at this time  - if not improving continuing by Friday - then can diagnose ABS, it has only been seven days.      Dyspnea- reflux being treated, feels mucus is more of the trigger  - try to limit pnd as a source  - continue 2 puffs spiriva qday-  - continue lpr management per gi   - pt to continue pulmonary management- positive methacholine challenge   - trial off montelukast to simplify her medication routine.     Lpr/dysphagia - may be a poor perceiver of symptoms  - continue ppi - stop ranitidine , pantoprazole 40 mg to BID- needs to decrease to help curb potential SE.   - may need to have esophagus stretched.   Spicy foods are temporally related to increased mucus.   - food skin prick testing with inhalant on procedure visit if possible.    - discussed to see Dr. Marroquin as food is getting stuck a few days per week.     Reviewed CBC, Ige, and Vitamin D labs, pt to continue 2000 IU daily.   Highest eos 300.       Follow up in November, sooner if needed. Procedure visit for all foods and inhalants.           Arabella Kelly M.D.  Allergy/Immunology  [place] [place] [facility]   [phone] phone  [phone] fax

## 2018-10-16 NOTE — PATIENT INSTRUCTIONS
Nose:  Start rhinocort aqua 1-2 sprays per day   Aim up and out spray don't sniff  Continue azelastine use as needed and may use a max of up to 4 times per day   Use two nasal sprays together after saline rinsing.     Increase the saline rinsing to 3 times per day     If by Friday, the symptoms are not improving , call me at [phone]    Continue the reflux medication, you can use as needed twice per day   Consider taking it 40 mins prior to breakfast and prior to dinner.     Vitamin D 2000 IU per day or 50,000 units per week.     Diet modification is key for reflux.     See Dr. Marroquin for potential need for restretching or evaluation.

## 2018-11-13 ENCOUNTER — OFFICE VISIT (OUTPATIENT)
Dept: PULMONOLOGY | Facility: CLINIC | Age: 58
End: 2018-11-13
Payer: COMMERCIAL

## 2018-11-13 ENCOUNTER — OFFICE VISIT (OUTPATIENT)
Dept: ALLERGY | Facility: CLINIC | Age: 58
End: 2018-11-13
Payer: COMMERCIAL

## 2018-11-13 VITALS
OXYGEN SATURATION: 98 % | HEART RATE: 76 BPM | SYSTOLIC BLOOD PRESSURE: 128 MMHG | BODY MASS INDEX: 35.32 KG/M2 | WEIGHT: 212 LBS | HEIGHT: 65 IN | DIASTOLIC BLOOD PRESSURE: 80 MMHG

## 2018-11-13 VITALS
BODY MASS INDEX: 35.32 KG/M2 | SYSTOLIC BLOOD PRESSURE: 128 MMHG | HEIGHT: 65 IN | WEIGHT: 212 LBS | DIASTOLIC BLOOD PRESSURE: 80 MMHG

## 2018-11-13 DIAGNOSIS — E55.9 VITAMIN D DEFICIENCY: ICD-10-CM

## 2018-11-13 DIAGNOSIS — J45.40 MODERATE PERSISTENT ASTHMA, UNSPECIFIED WHETHER COMPLICATED: ICD-10-CM

## 2018-11-13 DIAGNOSIS — R05.3 CHRONIC COUGH: ICD-10-CM

## 2018-11-13 DIAGNOSIS — J45.41 MODERATE PERSISTENT ASTHMA WITH ACUTE EXACERBATION: Primary | ICD-10-CM

## 2018-11-13 DIAGNOSIS — R13.19 ESOPHAGEAL DYSPHAGIA: ICD-10-CM

## 2018-11-13 DIAGNOSIS — K21.9 LARYNGOPHARYNGEAL REFLUX (LPR): ICD-10-CM

## 2018-11-13 DIAGNOSIS — R05.3 CHRONIC COUGH: Primary | ICD-10-CM

## 2018-11-13 DIAGNOSIS — J31.0 CHRONIC RHINITIS: ICD-10-CM

## 2018-11-13 PROBLEM — J45.909 ASTHMA: Status: ACTIVE | Noted: 2018-11-13

## 2018-11-13 PROCEDURE — 3008F BODY MASS INDEX DOCD: CPT | Mod: ,,, | Performed by: ALLERGY & IMMUNOLOGY

## 2018-11-13 PROCEDURE — 99214 OFFICE O/P EST MOD 30 MIN: CPT | Mod: ,,, | Performed by: ALLERGY & IMMUNOLOGY

## 2018-11-13 PROCEDURE — 99214 OFFICE O/P EST MOD 30 MIN: CPT | Mod: ,,, | Performed by: INTERNAL MEDICINE

## 2018-11-13 PROCEDURE — 3008F BODY MASS INDEX DOCD: CPT | Mod: ,,, | Performed by: INTERNAL MEDICINE

## 2018-11-13 RX ORDER — IPRATROPIUM BROMIDE 21 UG/1
2 SPRAY, METERED NASAL 3 TIMES DAILY
Qty: 30 ML | Refills: 6 | Status: SHIPPED | OUTPATIENT
Start: 2018-11-13 | End: 2019-02-26

## 2018-11-13 RX ORDER — VALACYCLOVIR HYDROCHLORIDE 1 G/1
1000 TABLET, FILM COATED ORAL DAILY PRN
Refills: 3 | COMMUNITY
Start: 2018-08-21 | End: 2022-07-06 | Stop reason: SDUPTHER

## 2018-11-13 NOTE — PROGRESS NOTES
"Subjective:       Patient ID: Valdez Salazar is a 58 y.o. female.    Chief Complaint: Asthma (had attack this am)    Sinus Problem     Asthma   Her past medical history is significant for asthma.          Pt presents for chronic cough.    Other doctors in tx: Dr. Hart; Dr. Farrar;  Dr. Corbin.     Sunday pt started to have asthma attacks.   Pt states at 1 am she was "perfect" then she did her inhalers and she states her symptoms were worse.   For the most part, she states she is "doing ok"    Saw Dr. Farrar- esophagus stretched vs yeast vs other- started diflucan yesterday.     Pt states she used her albuterol inhaler three times since she left her house.   She uses 2 puffs. Of albuterol   She states her mucus production is increased in her chest     Getting food stuck few days per week with breads. - may get her esophagus stretched.     Prior history   In the past she has tried omeprazole 40 mg BID, but Dr. farrar took her off this regimen.   He placed her on protonix 40 mg once per day.   Dr. Hart performed scope and dx with LPR and sent her to GI, Dr. Farrar. No vocal cord lesions at that time.   Cough:  Condition: improved   Sx: coughing spells, thick mucus- main issue is the thick mucus    Intensity: They can be bad enough to where she may go to the ED- however she is 90 % improved.   Tx: nasacort 2 sen qhs, started symbicort 80 1 puff bid- now improved but increased to 160 2 puffs twice per day by Dr. Corbin, azelastine- qid prn, spiriva 2 puff qday rx given 1 puff twice per day, mucinex D. Magic mouthwash qhs.  We had a trial off montelukast. She is on a medrol pack and finishing for orthopedics.    -Rescue inhaler may or may not help, not a consistent efficacy. mucinex-D helps with thickness of mucus. Discussed D can increase her BP.   -She has been on an antibiotic but isn't unsure of the name or type. - early June. It helped the "color " of the mucus but hasn't had improvement in sx.   Trigger: " "when she eats, more mucus comes and she coughs. Doesn't matter if it's water or food.   Protonix 40 mg given for BID x 4 weeks then once daily. Thinks that it helped with the mucus. BID was a better formulation than qday.     Duration: She has been having these symptoms for the past year.    Voice is still improved off breo.   Of note, Since she's been out of [facility], her symptoms have been bad.   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     Imaging:  Ct chest reviewed and no significant findings.   Sinus ct in 2015 reviewed and no significant findings.    Discussed possibly repeating as it has been 3 years since last scan.     Atopic history  Asthma: no history - but had a methacholine challenge- per patient report positive.   - stopped breo due to dysphonia. and montelukast was d/c. Now seeing Dr. Corbin.   Rhinitis: see above  Dysphagia: at times food gets stuck- see Dr. Marroquin for reflux- needs f/u  Food allergy: none   Oral allergy: none       Review of Systems      General: neg unexpected weight changes, fevers, chills, night sweats, malaise  HEENT: see hpi, Neg eye pain, vision changes, ear drainage, nose bleeds, throat tightness, sores in the mouth  CV: Neg chest pain, palpitations, swelling  Resp: see hpi, neg shortness of breath, hemoptysis  GI: see hpi, neg night abdominal pain, chronic diarrhea, chronic constipation  Derm: See Hpi, neg new rash, neg flushing  Mu/sk: Neg joint pain, joint swelling   Psych: Neg anxiety  neuro: neg chronic headaches, muscle weakness  Endo: neg heat/cold intolerance, chronic fatigue    Objective:     Vitals:    11/13/18 1004   BP: 128/80   Weight: 96.2 kg (212 lb)   Height: 5' 5" (1.651 m)   PF: 450 L/min        Physical Exam      General: no acute distress, well developed well nourished   HEENT:   Head:normocephalic atraumatic  Eyes: YOU, EOMI, Neg injection, scleral icterus, or conjunctival papillary hypertrophy.  Ears: tm clear bilaterally, normal " canal  Nose: 2-3+ inferior turbinates pink, neg nasal polyps            Mucosa: mild dryness            Septal irritation: right   OP: mucus membranes moist, - cobblestoning, - PND, neg erythema or lesions  Neck: supple, Full range of motion, neg lymphadenopathy  Chest: full respiratory excursion no abnormal chest abnormality  Resp: clear to ascultation bilaterally  CV: RRR, neg MRG, brisk capillary refill  Abdomen: BS+, non tender, non distended  Ext:  Neg clubbing, cyanosis, pitting edema  Skin: Neg rashes or lesions  Lymph: neg supraclavicular, axillary       Assessment:       1. Moderate persistent asthma with acute exacerbation    2. Chronic rhinitis    3. Chronic cough    4. Vitamin D deficiency    5. Esophageal dysphagia    6. Laryngopharyngeal reflux (LPR)        Plan:       Moderate persistent asthma with acute exacerbation  -     tiotropium bromide (SPIRIVA RESPIMAT) 2.5 mcg/actuation Mist; Inhale 2 puffs into the lungs once daily. Controller  Dispense: 4 g; Refill: 6    Chronic rhinitis  -     ipratropium (ATROVENT) 0.03 % nasal spray; 2 sprays by Nasal route 3 (three) times daily.  Dispense: 30 mL; Refill: 6    Chronic cough    Vitamin D deficiency    Esophageal dysphagia    Laryngopharyngeal reflux (LPR)       Hoarseness  - improving  - symbicort 160 2 puffs bid  -continue magic mouthwash to help with throat irritation prn.     Chronic rhinitis- gave instructions for skin prick testing.   - saline, stop nasacort temporarily, start rhinocort aqua - 1-2 bid , continue azelastine routinely - increase amt of times per day  Instruction given -   - technique reviewed today    - skin prick test to inhalants if above is not helpful.   - continue plain mucinex 1200 mg bid- discussed D or pseudophed has risk to increase her BP.   Limit the amount of dairy as it may increase mucus production.   Schedule inhalant- skin prick testing with food skin prick testing- if positive, consider ait- has 2 cats.  - not been  skin prick tested at this time  - if not improving continuing by Friday - then can diagnose ABS, it has only been seven days.      Dyspnea- reflux being treated, feels mucus is more of the trigger  - try to limit pnd as a source  - continue 2 puffs spiriva qday-  - continue lpr management per gi   - pt to continue pulmonary management- positive methacholine challenge   - trial off montelukast to simplify her medication routine.     Lpr/dysphagia - may be a poor perceiver of symptoms  - continue ppi - stop ranitidine , pantoprazole 40 mg to BID- needs to decrease to help curb potential SE.   - may need to have esophagus stretched.   Spicy foods are temporally related to increased mucus.   - food skin prick testing with inhalant on procedure visit if possible.    - discussed to see Dr. Marroquin as food is getting stuck a few days per week.   - Dr. Marroquin may stretch esophagus.     Reviewed CBC, Ige, and Vitamin D labs, pt to continue 2000 IU daily.   Highest eos 300.       Follow up in November, sooner if needed. Procedure visit for all foods and inhalants.           Arabella Kelly M.D.  Allergy/Immunology  [facility]   [phone] phone  [phone] fax

## 2018-11-13 NOTE — PROGRESS NOTES
"New Office Visit/Consultation Note    Patient Name: Valdez Salazar  MRN: [id number]  : 1960      Reason for visit: Asthma    HPI:     2018 - Pt with h/o asthma ( has seen Dr Bill in past), has trouble with dyspnea.  Has methacholine challenge test which was "positive".  Was on singulair and BREO, still had difficulties and "thick mucus".  Has trouble when reclining.  Has seen GI to evaluate for reflux ( had esophageal "stretched").  Was referred to Dr Kelly for evaluation and now here to establish care.  [place] also seen Dr Hart for ENT evaluation.  Had medication changes done by dr Kelly and feels better overall.    2018 - HAs been doing fine until last few days then she had some waking episodes.  This AM had some increased chest tightness and has used rescue inhaler more.  Has had some dysphagia and saw Dr Marroquin and was started on diflucan.  Feels better with regards with this.  Has not noted wheezing but does feel tight.    Asthma Flowsheet    Asthma -  Moderate  Persistent       Controlled    ACT - 21    Baseline PFT - FEV1- 95 %    Serum eosinophil count - 300  Serum IgE - < 2    Allergy testing - na   Desensitization - no    Therapy: ICS/LABA/LAMA +      PRN albuterol +                 Singulair                  Xolair                  Nucala                  Cinqair       Past Medical History    Past Medical History:   Diagnosis Date    Acid reflux     Allergic sinusitis     Asthma     PONV (postoperative nausea and vomiting)        Past Surgical History    Past Surgical History:   Procedure Laterality Date    ADENOIDECTOMY      cesarian section      ELBOW SURGERY      and hardware removal    HYSTERECTOMY      INCONTINENCE SURGERY      INJECTION-JOINT CARPAL TUNNEL Left 2015    [facility]    KNEE ARTHROSCOPY      rectocele      RELEASE-CARPAL TUNNEL Right 2015    [facility]    " TONSILLECTOMY         Medications      Current Outpatient Medications:     acetaminophen (TYLENOL) 500 MG tablet, Take 500 mg by mouth every 6 (six) hours as needed for Pain., Disp: , Rfl:     albuterol 90 mcg/actuation inhaler, Inhale 2 puffs into the lungs every 4 to 6 hours as needed for Wheezing. Rescue, Disp: 18 g, Rfl: 6    ALPRAZolam (XANAX) 1 MG tablet, USE AS DIRECTED, Disp: , Rfl: 0    azelastine (ASTELIN) 137 mcg (0.1 %) nasal spray, 1 spray per nare twice per day, may use up to 4 times daily 1 spray per nare for congestion and post nasal drip, Disp: 90 mL, Rfl: 4    budesonide-formoterol 160-4.5 mcg (SYMBICORT) 160-4.5 mcg/actuation HFAA, Inhale 2 puffs into the lungs every 12 (twelve) hours. Controller, Disp: 1 Inhaler, Rfl: 11    diphenhydrAMINE-aluminum-magnesium hydroxide-simethicone-lidocaine HCl 2%, Swish and spit 3 mLs every 4 (four) hours as needed., Disp: 300 mL, Rfl: 3    inhalat. spacing dev,sm. mask (AEROCHAMBER PLUS FLOW-VU,S MSK) Spcr, 1 Device by Misc.(Non-Drug; Combo Route) route as needed., Disp: 1 each, Rfl: 3    pantoprazole (PROTONIX) 40 MG tablet, Take 1 tablet (40 mg total) by mouth 2 (two) times daily., Disp: 60 tablet, Rfl: 3    pantoprazole (PROTONIX) 40 MG tablet, Take 1 tablet (40 mg total) by mouth 2 (two) times daily., Disp: 180 tablet, Rfl: 3    tiotropium bromide (SPIRIVA RESPIMAT) 1.25 mcg/actuation Mist, Inhale 2 puffs into the lungs once daily. Controller, Disp: 4 g, Rfl: 4    valACYclovir (VALTREX) 1000 MG tablet, Take 1,000 mg by mouth once daily., Disp: , Rfl: 3    triamcinolone (NASACORT) 55 mcg nasal inhaler, 2 sprays by Nasal route every evening., Disp: , Rfl:     Allergies    Review of patient's allergies indicates:   Allergen Reactions    Codeine Nausea And Vomiting    Stadol [butorphanol tartrate] Shortness Of Breath     Pt reports stops breathing    Wiley-dur Shortness Of Breath     Stops breathing    Morphine Nausea And Vomiting     Severe.       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   HENT: Positive for congestion and nosebleeds.    Eyes: Negative for pain.   Respiratory: Positive for cough, shortness of breath and wheezing. Negative for hemoptysis, sputum production and stridor.    Cardiovascular: Negative for chest pain, palpitations, orthopnea, claudication, leg swelling and PND.   Gastrointestinal: Positive for heartburn. Negative for abdominal pain, constipation, diarrhea, nausea and vomiting.   Genitourinary: Negative for dysuria, frequency and urgency.   Musculoskeletal: Negative for falls and myalgias.   Skin: Negative for itching and rash.   Neurological: Negative for sensory change, focal weakness and weakness.   Psychiatric/Behavioral: Negative for depression. The patient is not nervous/anxious.        Physical Exam    Vitals:    11/13/18 0922   BP: 128/80   Pulse: 76   SpO2: 98%   Weight: 96.2 kg (212 lb)   Height: 5' 5" (1.651 m)       Physical Exam   Constitutional: She is oriented to person, place, and time. She appears well-developed and well-nourished. No distress.   HENT:   Head: Normocephalic and atraumatic.   Right Ear: External ear normal.   Left Ear: External ear normal.   Nose: Nose normal.   Mouth/Throat: Oropharynx is clear and moist.   + minimal post nasal drip   Eyes: Conjunctivae and EOM are normal. Pupils are equal, round, and reactive to light.   Neck: " Normal range of motion. Neck supple. No JVD present. No tracheal deviation present. No thyromegaly present.   Cardiovascular: Normal rate, regular rhythm, normal heart sounds and intact distal pulses. Exam reveals no gallop and no friction rub.   No murmur heard.  Pulmonary/Chest: Effort normal and breath sounds normal. No stridor. No respiratory distress. She has no wheezes. She has no rales. She exhibits no tenderness.   Abdominal: Soft. Bowel sounds are normal. She exhibits no distension. There is no tenderness.   Musculoskeletal: Normal range of motion. She exhibits no edema or tenderness.   Lymphadenopathy:     She has no cervical adenopathy.   Neurological: She is alert and oriented to person, place, and time. No cranial nerve deficit.   Skin: Skin is warm and dry. She is not diaphoretic.   Psychiatric: She has a normal mood and affect. Her behavior is normal.   Nursing note and vitals reviewed.      Labs    Lab Results   Component Value Date    WBC 7.6 08/17/2018    HGB 13.7 08/17/2018    HCT 42.8 08/17/2018     08/17/2018       Sodium   Date Value Ref Range Status   07/29/2013 141 136 - 145 mmol/L Final     Potassium   Date Value Ref Range Status   07/29/2013 3.8 3.5 - 5.1 mmol/L Final     Chloride   Date Value Ref Range Status   07/29/2013 106 95 - 110 mmol/L Final     CO2   Date Value Ref Range Status   07/29/2013 22 (L) 23 - 29 mmol/L Final     BUN, Bld   Date Value Ref Range Status   07/29/2013 20 6 - 20 mg/dL Final     Creatinine   Date Value Ref Range Status   07/29/2013 0.7 0.5 - 1.4 mg/dL Final     Calcium   Date Value Ref Range Status   07/29/2013 9.1 8.7 - 10.5 mg/dL Final     Total Protein   Date Value Ref Range Status   07/29/2013 6.7 6.0 - 8.4 g/dL Final     ALBUMIN   Date Value Ref Range Status   07/29/2013 3.4 (L) 3.5 - 5.2 g/dL Final     Alkaline Phosphatase   Date Value Ref Range Status   07/29/2013 79 55 - 135 U/L Final     AST   Date Value Ref Range Status   07/29/2013 17 10 - 40  U/L Final     ALT   Date Value Ref Range Status   07/29/2013 18 10 - 44 U/L Final     Anion Gap   Date Value Ref Range Status   07/29/2013 13 5 - 15 meq/L Final       Xrays        Impression/Plan    Problem List Items Addressed This Visit        ENT    Chronic rhinitis  - continue present treatments  - ? CT sinuses  - ? Formal allergy testing       Pulmonary    Chronic cough  - multifactorial       GI    Laryngopharyngeal reflux (LPR)  - being treated      Other Visit Diagnoses     Asthma, unspecified asthma severity, unspecified whether complicated, unspecified whether persistent  - has mild exacerbation for last few days  - continue same meds, if worsens will need steroids  - on vitamin D replacement  - RTC 3 month    Vitamin D deficiency  - being replaced                                Thee Corbin MD

## 2018-11-13 NOTE — PATIENT INSTRUCTIONS
Nose:  Saline first  Azelastine- max of 1 spray per nare four times per day as needed  rhinocort aqua- order online- 1 spray per nare twice per day- increased symptoms 2 sprays per nare twice per day - 1-2 weeks then decrease back down to 1 spray per nare twice per day    As needed 2 spray per nare every 6 hours.     Continue montelukast 1 pill once per day     Lung:  Continue symbicort 2 puffs twice per day   Continue spiriva but increase the dose 2.5 1 puff twice per day   Rescue- may use 4-6 puffs as needed for cough, wheeze, shortness of breath

## 2018-11-19 ENCOUNTER — TELEPHONE (OUTPATIENT)
Dept: PULMONOLOGY | Facility: CLINIC | Age: 58
End: 2018-11-19

## 2018-11-19 DIAGNOSIS — R05.3 CHRONIC COUGH: Primary | ICD-10-CM

## 2018-11-19 RX ORDER — PREDNISONE 20 MG/1
20 TABLET ORAL DAILY
Qty: 4 TABLET | Refills: 0 | Status: SHIPPED | OUTPATIENT
Start: 2018-11-19 | End: 2018-11-23

## 2018-11-19 NOTE — TELEPHONE ENCOUNTER
Called allergy clinic with c/o bad hoarseness, sent her meds, then also needs pulmonology clearance for knee surgery, had a fall and they are moving her surgery up

## 2018-11-19 NOTE — TELEPHONE ENCOUNTER
Taking all meds, started new inhaler - now has no voice and can't cough mucous up.  She is now swallowing the magic mouth wash.  She has taken a Mucinex D.  She wants to know what else to do?  She hurt her leg when she fell on Thursday last week.  Talking is making her cough.  What to advise?    KAREEM Canela/Alice    Per Dr. Kelly:  Call in 20mg Prednisone x 4 days

## 2018-11-26 ENCOUNTER — TELEPHONE (OUTPATIENT)
Dept: PULMONOLOGY | Facility: CLINIC | Age: 58
End: 2018-11-26

## 2018-11-26 NOTE — TELEPHONE ENCOUNTER
Friday to urgent care for cough, congestion. Gave doxycycline and steroid x 5 days. Feeling better now, hasnt needed inhaler, cough is gone. Knee surgery is 12/7th-Dr Claude Williams, asking if you need her back in before surgery clearance?

## 2018-12-03 ENCOUNTER — OFFICE VISIT (OUTPATIENT)
Dept: PULMONOLOGY | Facility: CLINIC | Age: 58
End: 2018-12-03
Payer: COMMERCIAL

## 2018-12-03 VITALS
BODY MASS INDEX: 34.16 KG/M2 | SYSTOLIC BLOOD PRESSURE: 120 MMHG | HEIGHT: 65 IN | HEART RATE: 84 BPM | DIASTOLIC BLOOD PRESSURE: 84 MMHG | WEIGHT: 205 LBS | OXYGEN SATURATION: 98 %

## 2018-12-03 DIAGNOSIS — J31.0 CHRONIC RHINITIS: ICD-10-CM

## 2018-12-03 DIAGNOSIS — J45.40 MODERATE PERSISTENT ASTHMA, UNSPECIFIED WHETHER COMPLICATED: Primary | ICD-10-CM

## 2018-12-03 DIAGNOSIS — R05.3 CHRONIC COUGH: ICD-10-CM

## 2018-12-03 PROCEDURE — 3008F BODY MASS INDEX DOCD: CPT | Mod: ,,, | Performed by: INTERNAL MEDICINE

## 2018-12-03 PROCEDURE — 99214 OFFICE O/P EST MOD 30 MIN: CPT | Mod: ,,, | Performed by: INTERNAL MEDICINE

## 2018-12-03 NOTE — PROGRESS NOTES
"Office Visit    Patient Name: Valdez Salazar  MRN: [id number]  : 1960      Reason for visit: Asthma    HPI:     2018 - Pt with h/o asthma ( has seen Dr Bill in past), has trouble with dyspnea.  Has methacholine challenge test which was "positive".  Was on singulair and BREO, still had difficulties and "thick mucus".  Has trouble when reclining.  Has seen GI to evaluate for reflux ( had esophageal "stretched").  Was referred to Dr Kelly for evaluation and now here to establish care.  Hutchins also seen Dr Hart for ENT evaluation.  Had medication changes done by dr Kelly and feels better overall.    2018 - HAs been doing fine until last few days then she had some waking episodes.  This AM had some increased chest tightness and has used rescue inhaler more.  Has had some dysphagia and saw Dr Marroquin and was started on diflucan.  Feels better with regards with this.  Has not noted wheezing but does feel tight.    12/3/2018 - Here for follow up to have left knee replacement at [facility] this Friday, breathing is ok, has some cough and mucus.  After last vii she developed laryngitis which has yet to clear (? If related to her Symbicort) - she is to see Dr Peterson tomorrow AM.    Preoperative Pulmonary Clearance    Pt was seen for preoperative clearance from a pulmonary standpoint for planned left polly replacement.  The risks of the procedure have been discussed with the patient including the risk of prolonged ventilatory support/difficulty weaning from ventilator, post-procedure pneumonia, post-procedure respiratory failure and DVT/pulmonary embolism.  The pt is currently stable from their respiratory status and they are cleared for the planned procedure at increased risk.  The risk should not be considered prohibitive.  If you have any questions please contact me.  This clearance is pending evaluation by ENT for persistent hoarseness.      Asthma Flowsheet    Asthma -  Moderate  Persistent       " Controlled    ACT - 21    Baseline PFT - FEV1- 95 %    Serum eosinophil count - 300  Serum IgE - < 2    Allergy testing - na   Desensitization - no    Therapy: ICS/LABA/LAMA +      PRN albuterol +                 Singulair                  Xolair                  Nucala                  Cinqair       Past Medical History    Past Medical History:   Diagnosis Date    Acid reflux     Allergic sinusitis     Asthma     PONV (postoperative nausea and vomiting)        Past Surgical History    Past Surgical History:   Procedure Laterality Date    ADENOIDECTOMY      cesarian section      ELBOW SURGERY      and hardware removal    HYSTERECTOMY      INCONTINENCE SURGERY      INJECTION-JOINT CARPAL TUNNEL Left 6/5/2015    [facility] OR    KNEE ARTHROSCOPY      rectocele      RELEASE-CARPAL TUNNEL Right 6/5/2015    [facility] OR    TONSILLECTOMY         Medications      Current Outpatient Medications:     acetaminophen (TYLENOL) 500 MG tablet, Take 500 mg by mouth every 6 (six) hours as needed for Pain., Disp: , Rfl:     albuterol 90 mcg/actuation inhaler, Inhale 2 puffs into the lungs every 4 to 6 hours as needed for Wheezing. Rescue, Disp: 18 g, Rfl: 6    ALPRAZolam (XANAX) 1 MG tablet, USE AS DIRECTED, Disp: , Rfl: 0    budesonide-formoterol 160-4.5 mcg (SYMBICORT) 160-4.5 mcg/actuation HFAA, Inhale 2 puffs into the lungs every 12 (twelve) hours. Controller, Disp: 1 Inhaler, Rfl: 11    diphenhydrAMINE-aluminum-magnesium hydroxide-simethicone-lidocaine HCl 2%, Swish and spit 3 mLs every 4 (four) hours as needed., Disp: 300 mL, Rfl: 3    diphenhydrAMINE-aluminum-magnesium hydroxide-simethicone-lidocaine HCl 2%, SWISH AND SPIT 3 MLS EVERY 4 (FOUR) HOURS AS NEEDED., Disp: , Rfl: 3    inhalat. spacing dev,sm. mask (AEROCHAMBER PLUS FLOW-VU,S MSK) Spcr, 1 Device by Misc.(Non-Drug; Combo Route) route as needed., Disp: 1 each, Rfl: 3     pantoprazole (PROTONIX) 40 MG tablet, Take 1 tablet (40 mg total) by mouth 2 (two) times daily., Disp: 60 tablet, Rfl: 3    pantoprazole (PROTONIX) 40 MG tablet, Take 1 tablet (40 mg total) by mouth 2 (two) times daily., Disp: 180 tablet, Rfl: 3    tiotropium bromide (SPIRIVA RESPIMAT) 2.5 mcg/actuation Mist, Inhale 2 puffs into the lungs once daily. Controller, Disp: 4 g, Rfl: 6    valACYclovir (VALTREX) 1000 MG tablet, Take 1,000 mg by mouth once daily., Disp: , Rfl: 3    azelastine (ASTELIN) 137 mcg (0.1 %) nasal spray, 1 spray per nare twice per day, may use up to 4 times daily 1 spray per nare for congestion and post nasal drip, Disp: 90 mL, Rfl: 4    ipratropium (ATROVENT) 0.03 % nasal spray, 2 sprays by Nasal route 3 (three) times daily., Disp: 30 mL, Rfl: 6    triamcinolone (NASACORT) 55 mcg nasal inhaler, 2 sprays by Nasal route every evening., Disp: , Rfl:     Allergies    Review of patient's allergies indicates:   Allergen Reactions    Codeine Nausea And Vomiting    Stadol [butorphanol tartrate] Shortness Of Breath     Pt reports stops breathing    Wiley-dur Shortness Of Breath     Stops breathing    Morphine Nausea And Vomiting     Severe.      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   HENT: Positive for congestion and nosebleeds.    Eyes: Negative for pain.   Respiratory: Positive for cough, shortness of breath and  "wheezing. Negative for hemoptysis, sputum production and stridor.    Cardiovascular: Negative for chest pain, palpitations, orthopnea, claudication, leg swelling and PND.   Gastrointestinal: Positive for heartburn. Negative for abdominal pain, constipation, diarrhea, nausea and vomiting.   Genitourinary: Negative for dysuria, frequency and urgency.   Musculoskeletal: Negative for falls and myalgias.   Skin: Negative for itching and rash.   Neurological: Negative for sensory change, focal weakness and weakness.   Psychiatric/Behavioral: Negative for depression. The patient is not nervous/anxious.        Physical Exam    Vitals:    12/03/18 1101   BP: 120/84   Pulse: 84   SpO2: 98%   Weight: 93 kg (205 lb)   Height: 5' 5" (1.651 m)       Physical Exam   Constitutional: She is oriented to person, place, and time. She appears well-developed and well-nourished. No distress.   HENT:   Head: Normocephalic and atraumatic.   Right Ear: External ear normal.   Left Ear: External ear normal.   Nose: Nose normal.   Mouth/Throat: Oropharynx is clear and moist.   + minimal post nasal drip   Eyes: Conjunctivae and EOM are normal. Pupils are equal, round, and reactive to light.   Neck: Normal range of motion. Neck supple. No JVD present. No tracheal deviation present. No thyromegaly present.   Cardiovascular: Normal rate, regular rhythm, normal heart sounds and intact distal pulses. Exam reveals no gallop and no friction rub.   No murmur heard.  Pulmonary/Chest: Effort normal and breath sounds normal. No stridor. No respiratory distress. She has no wheezes. She has no rales. She exhibits no tenderness.   Abdominal: Soft. Bowel sounds are normal. She exhibits no distension. There is no tenderness.   Musculoskeletal: Normal range of motion. She exhibits no edema or tenderness.   Lymphadenopathy:     She has no cervical adenopathy.   Neurological: She is alert and oriented to person, place, and time. No cranial nerve deficit.   Skin: " Skin is warm and dry. She is not diaphoretic.   Psychiatric: She has a normal mood and affect. Her behavior is normal.   Nursing note and vitals reviewed.      Labs    Lab Results   Component Value Date    WBC 7.6 08/17/2018    HGB 13.7 08/17/2018    HCT 42.8 08/17/2018     08/17/2018       Sodium   Date Value Ref Range Status   07/29/2013 141 136 - 145 mmol/L Final     Potassium   Date Value Ref Range Status   07/29/2013 3.8 3.5 - 5.1 mmol/L Final     Chloride   Date Value Ref Range Status   07/29/2013 106 95 - 110 mmol/L Final     CO2   Date Value Ref Range Status   07/29/2013 22 (L) 23 - 29 mmol/L Final     BUN, Bld   Date Value Ref Range Status   07/29/2013 20 6 - 20 mg/dL Final     Creatinine   Date Value Ref Range Status   07/29/2013 0.7 0.5 - 1.4 mg/dL Final     Calcium   Date Value Ref Range Status   07/29/2013 9.1 8.7 - 10.5 mg/dL Final     Total Protein   Date Value Ref Range Status   07/29/2013 6.7 6.0 - 8.4 g/dL Final     ALBUMIN   Date Value Ref Range Status   07/29/2013 3.4 (L) 3.5 - 5.2 g/dL Final     Alkaline Phosphatase   Date Value Ref Range Status   07/29/2013 79 55 - 135 U/L Final     AST   Date Value Ref Range Status   07/29/2013 17 10 - 40 U/L Final     ALT   Date Value Ref Range Status   07/29/2013 18 10 - 44 U/L Final     Anion Gap   Date Value Ref Range Status   07/29/2013 13 5 - 15 meq/L Final       Xrays        Impression/Plan    Problem List Items Addressed This Visit        ENT    Chronic rhinitis  - continue present treatments       Pulmonary    Chronic cough  - multifactorial       GI    Laryngopharyngeal reflux (LPR)  - being treated      Other Visit Diagnoses     Asthma, unspecified asthma severity, unspecified whether complicated, unspecified whether persistent  - better and at baseline  - RTC 3 month    Vitamin D deficiency  - being replaced    Laryngitis  - to see ENT  - final approval for surgery depends on ENT evaluation                                Thee Corbin,  MD

## 2018-12-03 NOTE — NURSING
Total Joint Replacement Questionnaire     Valdez Salazar participated in Joint Class Dec 1    1. Have you ever had a Joint Replacement?   []Yes  [x] No    2. How many stairs/steps do you have to enter your home? 1/elevator  When going up, on what side is the railing?  [] Left  [] Right  [x] Bilateral  [] None    3. Do you own any Durable Medical Equipment?   [x] Rolling Walker  [] Standard Walker  [] Rollator  [] Cane  [x] Crutches  [] Bed Side Commode  [] Hip Kit  [x] Tub Transfer Bench  [] Shower Chair     4. Have you used a Home Health Company before?   [] Yes  [x] No  If Yes, Name of Company: na  Would you like to use this company again?   [] Yes  [] No  [x] Would you like to use your physician's preference?  [x] Yes  [] No    5. Have you arranged for someone to help you, for at least for 3 days, when you are discharged from the hospital?  [x] Yes  [] No  If Yes, Name(s) of person assisting: spouse,Horacio Mckenzie  12/3/2018

## 2018-12-05 ENCOUNTER — ANESTHESIA EVENT (OUTPATIENT)
Dept: SURGERY | Facility: OTHER | Age: 58
DRG: 470 | End: 2018-12-05
Payer: COMMERCIAL

## 2018-12-05 ENCOUNTER — HOSPITAL ENCOUNTER (OUTPATIENT)
Dept: PREADMISSION TESTING | Facility: OTHER | Age: 58
Discharge: HOME | DRG: 470 | End: 2018-12-05
Attending: ORTHOPAEDIC SURGERY
Payer: COMMERCIAL

## 2018-12-05 ENCOUNTER — TELEPHONE (OUTPATIENT)
Dept: PULMONOLOGY | Facility: CLINIC | Age: 58
End: 2018-12-05

## 2018-12-05 VITALS
HEART RATE: 64 BPM | HEIGHT: 65 IN | DIASTOLIC BLOOD PRESSURE: 71 MMHG | WEIGHT: 200 LBS | OXYGEN SATURATION: 98 % | TEMPERATURE: 98 F | BODY MASS INDEX: 33.32 KG/M2 | SYSTOLIC BLOOD PRESSURE: 109 MMHG

## 2018-12-05 DIAGNOSIS — M17.12 PRIMARY OSTEOARTHRITIS OF LEFT KNEE: Primary | ICD-10-CM

## 2018-12-05 LAB
ABO + RH BLD: NORMAL
BASOPHILS # BLD AUTO: 0.06 K/UL
BASOPHILS NFR BLD: 0.7 %
BILIRUB UR QL STRIP: NEGATIVE
BLD GP AB SCN CELLS X3 SERPL QL: NORMAL
CLARITY UR: CLEAR
COLOR UR: YELLOW
DIFFERENTIAL METHOD: ABNORMAL
EOSINOPHIL # BLD AUTO: 0.1 K/UL
EOSINOPHIL NFR BLD: 1.6 %
ERYTHROCYTE [DISTWIDTH] IN BLOOD BY AUTOMATED COUNT: 13.2 %
GLUCOSE UR QL STRIP: NEGATIVE
HCT VFR BLD AUTO: 43.9 %
HGB BLD-MCNC: 14 G/DL
HGB UR QL STRIP: NEGATIVE
KETONES UR QL STRIP: ABNORMAL
LEUKOCYTE ESTERASE UR QL STRIP: NEGATIVE
LYMPHOCYTES # BLD AUTO: 3 K/UL
LYMPHOCYTES NFR BLD: 35.3 %
MCH RBC QN AUTO: 29.2 PG
MCHC RBC AUTO-ENTMCNC: 31.9 G/DL
MCV RBC AUTO: 92 FL
MONOCYTES # BLD AUTO: 0.7 K/UL
MONOCYTES NFR BLD: 8.3 %
NEUTROPHILS # BLD AUTO: 4.6 K/UL
NEUTROPHILS NFR BLD: 53.9 %
NITRITE UR QL STRIP: NEGATIVE
PH UR STRIP: 6 [PH] (ref 5–8)
PLATELET # BLD AUTO: 266 K/UL
PMV BLD AUTO: 10.6 FL
PROT UR QL STRIP: NEGATIVE
RBC # BLD AUTO: 4.79 M/UL
SP GR UR STRIP: 1.01 (ref 1–1.03)
URN SPEC COLLECT METH UR: ABNORMAL
UROBILINOGEN UR STRIP-ACNC: NEGATIVE EU/DL
WBC # BLD AUTO: 8.51 K/UL

## 2018-12-05 PROCEDURE — 81003 URINALYSIS AUTO W/O SCOPE: CPT

## 2018-12-05 PROCEDURE — 85025 COMPLETE CBC W/AUTO DIFF WBC: CPT

## 2018-12-05 PROCEDURE — 86901 BLOOD TYPING SEROLOGIC RH(D): CPT

## 2018-12-05 PROCEDURE — 36415 COLL VENOUS BLD VENIPUNCTURE: CPT

## 2018-12-05 RX ORDER — BENZONATATE 200 MG/1
200 CAPSULE ORAL 3 TIMES DAILY PRN
COMMUNITY
End: 2019-02-26

## 2018-12-05 RX ORDER — SODIUM CHLORIDE, SODIUM LACTATE, POTASSIUM CHLORIDE, CALCIUM CHLORIDE 600; 310; 30; 20 MG/100ML; MG/100ML; MG/100ML; MG/100ML
INJECTION, SOLUTION INTRAVENOUS CONTINUOUS
Status: CANCELLED | OUTPATIENT
Start: 2018-12-05

## 2018-12-05 RX ORDER — ALBUTEROL SULFATE 0.83 MG/ML
2.5 SOLUTION RESPIRATORY (INHALATION)
Status: CANCELLED | OUTPATIENT
Start: 2018-12-05 | End: 2018-12-05

## 2018-12-05 NOTE — ANESTHESIA PREPROCEDURE EVALUATION
12/05/2018  Valdez Salazar is a 58 y.o., female.    Anesthesia Evaluation    I have reviewed the Patient Summary Reports.    I have reviewed the Nursing Notes.   I have reviewed the Medications.     Review of Systems  Anesthesia Hx:  No problems with previous Anesthesia  Denies Family Hx of Anesthesia complications.  Personal Hx of Anesthesia complications, Post-Operative Nausea/Vomiting   Social:  Non-Smoker    Hematology/Oncology:  Hematology Normal   Oncology Normal     EENT/Dental:EENT/Dental Normal   Cardiovascular:  Cardiovascular Normal Exercise tolerance: good     Pulmonary:   Asthma moderate Denies Shortness of breath.    Renal/:  Renal/ Normal     Hepatic/GI:   GERD, poorly controlled Patient very hoarse for last 2 weeks. Attributed to reflux. Saw ENT and he saw nodules and swollen esoph   Musculoskeletal:  Musculoskeletal Normal    Neurological:  Neurology Normal    Endocrine:  Endocrine Normal    Dermatological:  Skin Normal    Psych:  Psychiatric Normal           Physical Exam  General:  Obesity    Airway/Jaw/Neck:  Airway Findings: Mouth Opening: Normal Tongue: Normal  General Airway Assessment: Adult  Mallampati: I  TM Distance: Normal, at least 6 cm  Jaw/Neck Findings:  Neck ROM: Normal ROM      Dental:  Dental Findings: In tact             Anesthesia Plan  Type of Anesthesia, risks & benefits discussed:  Anesthesia Type:  spinal  Patient's Preference:   Intra-op Monitoring Plan: standard ASA monitors  Intra-op Monitoring Plan Comments:   Post Op Pain Control Plan: multimodal analgesia  Post Op Pain Control Plan Comments:   Induction:    Beta Blocker:         Informed Consent: Patient understands risks and agrees with Anesthesia plan.  Questions answered. Anesthesia consent signed with patient.  ASA Score: 2     Day of Surgery Review of History & Physical:    H&P update referred to the  surgeon.     Anesthesia Plan Notes: Recent CBC ok,very nervous ,hoarse pre op        Ready For Surgery From Anesthesia Perspective.

## 2018-12-05 NOTE — DISCHARGE INSTRUCTIONS
PRE-ADMIT TESTING -  [phone]    [address]  [place] [facility]          Your surgery has been scheduled at [facility]. We are pleased to have the opportunity to serve you. For Further Information please call [phone].    On the day of surgery please report to the Information Desk on the 1st floor.    · CONTACT YOUR PHYSICIAN'S OFFICE THE DAY PRIOR TO YOUR SURGERY TO OBTAIN YOUR ARRIVAL TIME.     · The evening before surgery do not eat anything after 9 p.m. ( this includes hard candy, chewing gum and mints).  You may only have GATORADE, POWERADE AND WATER  from 9 p.m. until you leave your home.   DO NOT DRINK ANY LIQUIDS ON THE WAY TO THE HOSPITAL.      SPECIAL MEDICATION INSTRUCTIONS: TAKE medications checked off by the Anesthesiologist on your Medication List.    Angiogram Patients: Take medications as instructed by your physician, including aspirin.     Surgery Patients:    If you take ASPIRIN - Your PHYSICIAN/SURGEON will need to inform you IF/OR when you need to stop taking aspirin prior to your surgery.     Do Not take any medications containing IBUPROFEN.  Do Not Wear any make-up or dark nail polish   (especially eye make-up) to surgery. If you come to surgery with makeup on you will be required to remove the makeup or nail polish.    Do not shave your surgical area at least 5 days prior to your surgery. The surgical prep will be performed at the hospital according to Infection Control regulations.    Leave all valuables at home.   Do Not wear any jewelry or watches, including any metal in body piercings.  Contact Lens must be removed before surgery. Either do not wear the contact lens or bring a case and solution for storage.  Please bring a container for eyeglasses or dentures as required.  Bring any paperwork your physician has provided, such as consent forms,  history and physicals, doctor's orders, etc.   Bring comfortable clothes that are loose fitting to wear upon  discharge. Take into consideration the type of surgery being performed.  Maintain your diet as advised per your physician the day prior to surgery.      Adequate rest the night before surgery is advised.   Park in the Parking lot behind the hospital or in the [facility] across the street from the parking lot. Parking is complimentary.  If you will be discharged the same day as your procedure, please arrange for a responsible adult to drive you home or to accompany you if traveling by taxi.   YOU WILL NOT BE PERMITTED TO DRIVE OR TO LEAVE THE HOSPITAL ALONE AFTER SURGERY.   It is strongly recommended that you arrange for someone to remain with you for the first 24 hrs following your surgery.       Thank you for your cooperation.  The Staff of [facility].        Bathing Instructions                                                                 Please shower the evening before and morning of your procedure with    ANTIBACTERIAL SOAP. ( DIAL, etc )  Concentrate on the surgical area   for at least 3 minutes and rinse completely. Dry off as usual.   Do not use any deodorant, powder, body lotions, perfume, after shave or    cologne.

## 2018-12-07 ENCOUNTER — HOSPITAL ENCOUNTER (INPATIENT)
Facility: OTHER | Age: 58
LOS: 2 days | Discharge: HOME HEALTH | DRG: 470 | End: 2018-12-09
Attending: ORTHOPAEDIC SURGERY | Admitting: ORTHOPAEDIC SURGERY
Payer: COMMERCIAL

## 2018-12-07 ENCOUNTER — ANESTHESIA (OUTPATIENT)
Dept: SURGERY | Facility: OTHER | Age: 58
DRG: 470 | End: 2018-12-07
Payer: COMMERCIAL

## 2018-12-07 DIAGNOSIS — M17.12 PRIMARY OSTEOARTHRITIS OF LEFT KNEE: Primary | ICD-10-CM

## 2018-12-07 DIAGNOSIS — M17.12 OSTEOARTHRITIS OF LEFT KNEE: ICD-10-CM

## 2018-12-07 PROCEDURE — 63600175 PHARM REV CODE 636 W HCPCS: Performed by: ANESTHESIOLOGY

## 2018-12-07 PROCEDURE — 27201423 OPTIME MED/SURG SUP & DEVICES STERILE SUPPLY: Performed by: ORTHOPAEDIC SURGERY

## 2018-12-07 PROCEDURE — C1729 CATH, DRAINAGE: HCPCS | Performed by: ORTHOPAEDIC SURGERY

## 2018-12-07 PROCEDURE — 11000001 HC ACUTE MED/SURG PRIVATE ROOM

## 2018-12-07 PROCEDURE — 94799 UNLISTED PULMONARY SVC/PX: CPT

## 2018-12-07 PROCEDURE — C1776 JOINT DEVICE (IMPLANTABLE): HCPCS | Performed by: ORTHOPAEDIC SURGERY

## 2018-12-07 PROCEDURE — 25000003 PHARM REV CODE 250: Performed by: NURSE ANESTHETIST, CERTIFIED REGISTERED

## 2018-12-07 PROCEDURE — 25000003 PHARM REV CODE 250: Performed by: ANESTHESIOLOGY

## 2018-12-07 PROCEDURE — 71000039 HC RECOVERY, EACH ADD'L HOUR: Performed by: ORTHOPAEDIC SURGERY

## 2018-12-07 PROCEDURE — 36000710: Performed by: ORTHOPAEDIC SURGERY

## 2018-12-07 PROCEDURE — 25000003 PHARM REV CODE 250: Performed by: ORTHOPAEDIC SURGERY

## 2018-12-07 PROCEDURE — 97161 PT EVAL LOW COMPLEX 20 MIN: CPT

## 2018-12-07 PROCEDURE — 37000008 HC ANESTHESIA 1ST 15 MINUTES: Performed by: ORTHOPAEDIC SURGERY

## 2018-12-07 PROCEDURE — 94761 N-INVAS EAR/PLS OXIMETRY MLT: CPT

## 2018-12-07 PROCEDURE — 63600175 PHARM REV CODE 636 W HCPCS: Performed by: ORTHOPAEDIC SURGERY

## 2018-12-07 PROCEDURE — 63600175 PHARM REV CODE 636 W HCPCS: Performed by: NURSE ANESTHETIST, CERTIFIED REGISTERED

## 2018-12-07 PROCEDURE — 37000009 HC ANESTHESIA EA ADD 15 MINS: Performed by: ORTHOPAEDIC SURGERY

## 2018-12-07 PROCEDURE — 71000033 HC RECOVERY, INTIAL HOUR: Performed by: ORTHOPAEDIC SURGERY

## 2018-12-07 PROCEDURE — 25000003 PHARM REV CODE 250: Performed by: NURSE PRACTITIONER

## 2018-12-07 PROCEDURE — 94640 AIRWAY INHALATION TREATMENT: CPT

## 2018-12-07 PROCEDURE — C1713 ANCHOR/SCREW BN/BN,TIS/BN: HCPCS | Performed by: ORTHOPAEDIC SURGERY

## 2018-12-07 PROCEDURE — 36000711: Performed by: ORTHOPAEDIC SURGERY

## 2018-12-07 PROCEDURE — 0SRD0J9 REPLACEMENT OF LEFT KNEE JOINT WITH SYNTHETIC SUBSTITUTE, CEMENTED, OPEN APPROACH: ICD-10-PCS | Performed by: ORTHOPAEDIC SURGERY

## 2018-12-07 DEVICE — CEMENT BONE STD: Type: IMPLANTABLE DEVICE | Site: KNEE | Status: FUNCTIONAL

## 2018-12-07 DEVICE — INSERT TIBIAL BEARING 10 X 71/: Type: IMPLANTABLE DEVICE | Site: KNEE | Status: FUNCTIONAL

## 2018-12-07 DEVICE — COMP FEM POST STBL 65MM L: Type: IMPLANTABLE DEVICE | Site: KNEE | Status: FUNCTIONAL

## 2018-12-07 DEVICE — PATELLA COMPONENT 3 PEG 31X8MM: Type: IMPLANTABLE DEVICE | Site: KNEE | Status: FUNCTIONAL

## 2018-12-07 DEVICE — TIBIAL TRAY CRUCIATE 71MM: Type: IMPLANTABLE DEVICE | Site: KNEE | Status: FUNCTIONAL

## 2018-12-07 RX ORDER — LIDOCAINE HCL/PF 100 MG/5ML
SYRINGE (ML) INTRAVENOUS
Status: DISCONTINUED | OUTPATIENT
Start: 2018-12-07 | End: 2018-12-07

## 2018-12-07 RX ORDER — VALACYCLOVIR HYDROCHLORIDE 500 MG/1
1000 TABLET, FILM COATED ORAL DAILY
Status: DISCONTINUED | OUTPATIENT
Start: 2018-12-07 | End: 2018-12-09 | Stop reason: HOSPADM

## 2018-12-07 RX ORDER — GLYCOPYRROLATE 0.2 MG/ML
INJECTION INTRAMUSCULAR; INTRAVENOUS
Status: DISCONTINUED | OUTPATIENT
Start: 2018-12-07 | End: 2018-12-07

## 2018-12-07 RX ORDER — MEPERIDINE HYDROCHLORIDE 50 MG/ML
12.5 INJECTION INTRAMUSCULAR; INTRAVENOUS; SUBCUTANEOUS ONCE AS NEEDED
Status: COMPLETED | OUTPATIENT
Start: 2018-12-07 | End: 2018-12-07

## 2018-12-07 RX ORDER — MIDAZOLAM HYDROCHLORIDE 5 MG/ML
5 INJECTION INTRAMUSCULAR; INTRAVENOUS
Status: COMPLETED | OUTPATIENT
Start: 2018-12-07 | End: 2018-12-07

## 2018-12-07 RX ORDER — CEFAZOLIN SODIUM 1 G/3ML
2 INJECTION, POWDER, FOR SOLUTION INTRAMUSCULAR; INTRAVENOUS
Status: COMPLETED | OUTPATIENT
Start: 2018-12-07 | End: 2018-12-07

## 2018-12-07 RX ORDER — HETASTARCH 6 G/100ML
INJECTION, SOLUTION INTRAVENOUS CONTINUOUS PRN
Status: DISCONTINUED | OUTPATIENT
Start: 2018-12-07 | End: 2018-12-07

## 2018-12-07 RX ORDER — TRANEXAMIC ACID 100 MG/ML
INJECTION, SOLUTION INTRAVENOUS
Status: DISCONTINUED | OUTPATIENT
Start: 2018-12-07 | End: 2018-12-07

## 2018-12-07 RX ORDER — FENTANYL CITRATE 50 UG/ML
INJECTION, SOLUTION INTRAMUSCULAR; INTRAVENOUS
Status: DISCONTINUED | OUTPATIENT
Start: 2018-12-07 | End: 2018-12-07

## 2018-12-07 RX ORDER — LORAZEPAM 0.5 MG/1
0.5 TABLET ORAL 2 TIMES DAILY PRN
Status: DISCONTINUED | OUTPATIENT
Start: 2018-12-07 | End: 2018-12-09 | Stop reason: HOSPADM

## 2018-12-07 RX ORDER — PANTOPRAZOLE SODIUM 40 MG/1
40 TABLET, DELAYED RELEASE ORAL 2 TIMES DAILY
Status: DISCONTINUED | OUTPATIENT
Start: 2018-12-07 | End: 2018-12-07 | Stop reason: SDUPTHER

## 2018-12-07 RX ORDER — MORPHINE SULFATE 4 MG/ML
4 INJECTION, SOLUTION INTRAMUSCULAR; INTRAVENOUS ONCE
Status: COMPLETED | OUTPATIENT
Start: 2018-12-07 | End: 2018-12-07

## 2018-12-07 RX ORDER — SODIUM CHLORIDE 0.9 % (FLUSH) 0.9 %
5 SYRINGE (ML) INJECTION
Status: DISCONTINUED | OUTPATIENT
Start: 2018-12-07 | End: 2018-12-09 | Stop reason: HOSPADM

## 2018-12-07 RX ORDER — CEFAZOLIN SODIUM 2 G/50ML
2 SOLUTION INTRAVENOUS
Status: COMPLETED | OUTPATIENT
Start: 2018-12-07 | End: 2018-12-08

## 2018-12-07 RX ORDER — ENOXAPARIN SODIUM 100 MG/ML
40 INJECTION SUBCUTANEOUS EVERY 24 HOURS
Status: DISCONTINUED | OUTPATIENT
Start: 2018-12-07 | End: 2018-12-09 | Stop reason: HOSPADM

## 2018-12-07 RX ORDER — HYDROCODONE BITARTRATE AND ACETAMINOPHEN 5; 325 MG/1; MG/1
1 TABLET ORAL EVERY 4 HOURS PRN
Status: DISCONTINUED | OUTPATIENT
Start: 2018-12-07 | End: 2018-12-07

## 2018-12-07 RX ORDER — BUDESONIDE AND FORMOTEROL FUMARATE DIHYDRATE 160; 4.5 UG/1; UG/1
2 AEROSOL RESPIRATORY (INHALATION) EVERY 12 HOURS
Status: DISCONTINUED | OUTPATIENT
Start: 2018-12-07 | End: 2018-12-09 | Stop reason: HOSPADM

## 2018-12-07 RX ORDER — PROPOFOL 10 MG/ML
VIAL (ML) INTRAVENOUS CONTINUOUS PRN
Status: DISCONTINUED | OUTPATIENT
Start: 2018-12-07 | End: 2018-12-07

## 2018-12-07 RX ORDER — SODIUM CHLORIDE 0.9 % (FLUSH) 0.9 %
3 SYRINGE (ML) INJECTION
Status: DISCONTINUED | OUTPATIENT
Start: 2018-12-07 | End: 2018-12-07

## 2018-12-07 RX ORDER — FENTANYL CITRATE 50 UG/ML
25 INJECTION, SOLUTION INTRAMUSCULAR; INTRAVENOUS EVERY 5 MIN PRN
Status: COMPLETED | OUTPATIENT
Start: 2018-12-07 | End: 2018-12-07

## 2018-12-07 RX ORDER — MORPHINE SULFATE 2 MG/ML
2 INJECTION, SOLUTION INTRAMUSCULAR; INTRAVENOUS EVERY 4 HOURS PRN
Status: DISCONTINUED | OUTPATIENT
Start: 2018-12-07 | End: 2018-12-07

## 2018-12-07 RX ORDER — SODIUM CHLORIDE, SODIUM LACTATE, POTASSIUM CHLORIDE, CALCIUM CHLORIDE 600; 310; 30; 20 MG/100ML; MG/100ML; MG/100ML; MG/100ML
INJECTION, SOLUTION INTRAVENOUS CONTINUOUS
Status: DISCONTINUED | OUTPATIENT
Start: 2018-12-07 | End: 2018-12-07

## 2018-12-07 RX ORDER — CELECOXIB 200 MG/1
200 CAPSULE ORAL 2 TIMES DAILY
Status: DISCONTINUED | OUTPATIENT
Start: 2018-12-07 | End: 2018-12-09 | Stop reason: HOSPADM

## 2018-12-07 RX ORDER — PANTOPRAZOLE SODIUM 40 MG/1
40 TABLET, DELAYED RELEASE ORAL 2 TIMES DAILY
Status: DISCONTINUED | OUTPATIENT
Start: 2018-12-07 | End: 2018-12-09 | Stop reason: HOSPADM

## 2018-12-07 RX ORDER — OXYCODONE HYDROCHLORIDE 5 MG/1
5 TABLET ORAL
Status: DISCONTINUED | OUTPATIENT
Start: 2018-12-07 | End: 2018-12-07 | Stop reason: HOSPADM

## 2018-12-07 RX ORDER — ONDANSETRON 8 MG/1
8 TABLET, ORALLY DISINTEGRATING ORAL EVERY 8 HOURS PRN
Status: DISCONTINUED | OUTPATIENT
Start: 2018-12-07 | End: 2018-12-08

## 2018-12-07 RX ORDER — ALBUTEROL SULFATE 0.83 MG/ML
2.5 SOLUTION RESPIRATORY (INHALATION)
Status: DISCONTINUED | OUTPATIENT
Start: 2018-12-07 | End: 2018-12-07

## 2018-12-07 RX ORDER — PROPOFOL 10 MG/ML
VIAL (ML) INTRAVENOUS
Status: DISCONTINUED | OUTPATIENT
Start: 2018-12-07 | End: 2018-12-07

## 2018-12-07 RX ORDER — HYDROCODONE BITARTRATE AND ACETAMINOPHEN 10; 325 MG/1; MG/1
1 TABLET ORAL EVERY 4 HOURS PRN
Status: DISCONTINUED | OUTPATIENT
Start: 2018-12-07 | End: 2018-12-08

## 2018-12-07 RX ORDER — HYDROMORPHONE HYDROCHLORIDE 2 MG/ML
0.4 INJECTION, SOLUTION INTRAMUSCULAR; INTRAVENOUS; SUBCUTANEOUS EVERY 5 MIN PRN
Status: DISCONTINUED | OUTPATIENT
Start: 2018-12-07 | End: 2018-12-07 | Stop reason: HOSPADM

## 2018-12-07 RX ORDER — MIDAZOLAM HYDROCHLORIDE 1 MG/ML
INJECTION INTRAMUSCULAR; INTRAVENOUS
Status: DISCONTINUED | OUTPATIENT
Start: 2018-12-07 | End: 2018-12-07

## 2018-12-07 RX ORDER — MUPIROCIN 20 MG/G
1 OINTMENT TOPICAL 2 TIMES DAILY
Status: DISCONTINUED | OUTPATIENT
Start: 2018-12-07 | End: 2018-12-09 | Stop reason: HOSPADM

## 2018-12-07 RX ORDER — FAMOTIDINE 20 MG/1
20 TABLET, FILM COATED ORAL 2 TIMES DAILY
Status: DISCONTINUED | OUTPATIENT
Start: 2018-12-07 | End: 2018-12-09 | Stop reason: HOSPADM

## 2018-12-07 RX ORDER — MORPHINE SULFATE 4 MG/ML
4 INJECTION, SOLUTION INTRAMUSCULAR; INTRAVENOUS EVERY 4 HOURS PRN
Status: DISCONTINUED | OUTPATIENT
Start: 2018-12-07 | End: 2018-12-09 | Stop reason: HOSPADM

## 2018-12-07 RX ORDER — EPHEDRINE SULFATE 50 MG/ML
INJECTION, SOLUTION INTRAVENOUS
Status: DISCONTINUED | OUTPATIENT
Start: 2018-12-07 | End: 2018-12-07

## 2018-12-07 RX ORDER — BENZONATATE 100 MG/1
200 CAPSULE ORAL 3 TIMES DAILY PRN
Status: DISCONTINUED | OUTPATIENT
Start: 2018-12-07 | End: 2018-12-09 | Stop reason: HOSPADM

## 2018-12-07 RX ORDER — ONDANSETRON 2 MG/ML
4 INJECTION INTRAMUSCULAR; INTRAVENOUS DAILY PRN
Status: DISCONTINUED | OUTPATIENT
Start: 2018-12-07 | End: 2018-12-07 | Stop reason: HOSPADM

## 2018-12-07 RX ORDER — ROPIVACAINE HYDROCHLORIDE 5 MG/ML
INJECTION, SOLUTION EPIDURAL; INFILTRATION; PERINEURAL
Status: COMPLETED | OUTPATIENT
Start: 2018-12-07 | End: 2018-12-07

## 2018-12-07 RX ORDER — HYDROCODONE BITARTRATE AND ACETAMINOPHEN 5; 325 MG/1; MG/1
1 TABLET ORAL EVERY 4 HOURS PRN
Status: DISCONTINUED | OUTPATIENT
Start: 2018-12-07 | End: 2018-12-08

## 2018-12-07 RX ORDER — ONDANSETRON 2 MG/ML
INJECTION INTRAMUSCULAR; INTRAVENOUS
Status: DISCONTINUED | OUTPATIENT
Start: 2018-12-07 | End: 2018-12-07

## 2018-12-07 RX ADMIN — SODIUM CHLORIDE, SODIUM LACTATE, POTASSIUM CHLORIDE, AND CALCIUM CHLORIDE: 600; 310; 30; 20 INJECTION, SOLUTION INTRAVENOUS at 07:12

## 2018-12-07 RX ADMIN — MORPHINE SULFATE 2 MG: 2 INJECTION, SOLUTION INTRAMUSCULAR; INTRAVENOUS at 01:12

## 2018-12-07 RX ADMIN — TRANEXAMIC ACID 900 MG: 100 INJECTION, SOLUTION INTRAVENOUS at 09:12

## 2018-12-07 RX ADMIN — FENTANYL CITRATE 25 MCG: 50 INJECTION, SOLUTION INTRAMUSCULAR; INTRAVENOUS at 10:12

## 2018-12-07 RX ADMIN — GLYCOPYRROLATE 0.2 MG: 0.2 INJECTION, SOLUTION INTRAMUSCULAR; INTRAVENOUS at 08:12

## 2018-12-07 RX ADMIN — MORPHINE SULFATE 2 MG: 2 INJECTION, SOLUTION INTRAMUSCULAR; INTRAVENOUS at 05:12

## 2018-12-07 RX ADMIN — BUDESONIDE AND FORMOTEROL FUMARATE DIHYDRATE 2 PUFF: 160; 4.5 AEROSOL RESPIRATORY (INHALATION) at 09:12

## 2018-12-07 RX ADMIN — EPHEDRINE SULFATE 10 MG: 50 INJECTION INTRAMUSCULAR; INTRAVENOUS; SUBCUTANEOUS at 07:12

## 2018-12-07 RX ADMIN — CEFAZOLIN SODIUM 2 G: 2 SOLUTION INTRAVENOUS at 11:12

## 2018-12-07 RX ADMIN — MIDAZOLAM HYDROCHLORIDE 1 MG: 1 INJECTION, SOLUTION INTRAMUSCULAR; INTRAVENOUS at 07:12

## 2018-12-07 RX ADMIN — HETASTARCH IN SODIUM CHLORIDE: 6; .9 INJECTION, SOLUTION INTRAVENOUS at 08:12

## 2018-12-07 RX ADMIN — EPHEDRINE SULFATE 10 MG: 50 INJECTION INTRAMUSCULAR; INTRAVENOUS; SUBCUTANEOUS at 08:12

## 2018-12-07 RX ADMIN — ROPIVACAINE HYDROCHLORIDE 3 ML: 5 INJECTION, SOLUTION EPIDURAL; INFILTRATION; PERINEURAL at 07:12

## 2018-12-07 RX ADMIN — MORPHINE SULFATE 4 MG: 4 INJECTION INTRAVENOUS at 11:12

## 2018-12-07 RX ADMIN — ENOXAPARIN SODIUM 40 MG: 100 INJECTION SUBCUTANEOUS at 05:12

## 2018-12-07 RX ADMIN — FAMOTIDINE 20 MG: 20 TABLET ORAL at 03:12

## 2018-12-07 RX ADMIN — CELECOXIB 200 MG: 200 CAPSULE ORAL at 09:12

## 2018-12-07 RX ADMIN — MORPHINE SULFATE 4 MG: 4 INJECTION INTRAVENOUS at 03:12

## 2018-12-07 RX ADMIN — PROPOFOL 30 MG: 10 INJECTION, EMULSION INTRAVENOUS at 08:12

## 2018-12-07 RX ADMIN — PROPOFOL 30 MG: 10 INJECTION, EMULSION INTRAVENOUS at 07:12

## 2018-12-07 RX ADMIN — LORAZEPAM 0.5 MG: 0.5 TABLET ORAL at 12:12

## 2018-12-07 RX ADMIN — MUPIROCIN 1 G: 20 OINTMENT TOPICAL at 09:12

## 2018-12-07 RX ADMIN — ONDANSETRON 8 MG: 8 TABLET, ORALLY DISINTEGRATING ORAL at 05:12

## 2018-12-07 RX ADMIN — CEFAZOLIN 2 G: 330 INJECTION, POWDER, FOR SOLUTION INTRAMUSCULAR; INTRAVENOUS at 07:12

## 2018-12-07 RX ADMIN — FAMOTIDINE 20 MG: 20 TABLET ORAL at 09:12

## 2018-12-07 RX ADMIN — FENTANYL CITRATE 50 MCG: 50 INJECTION, SOLUTION INTRAMUSCULAR; INTRAVENOUS at 09:12

## 2018-12-07 RX ADMIN — MORPHINE SULFATE 4 MG: 4 INJECTION INTRAVENOUS at 08:12

## 2018-12-07 RX ADMIN — HYDROCODONE BITARTRATE AND ACETAMINOPHEN 1 TABLET: 10; 325 TABLET ORAL at 01:12

## 2018-12-07 RX ADMIN — TRANEXAMIC ACID 900 MG: 100 INJECTION, SOLUTION INTRAVENOUS at 07:12

## 2018-12-07 RX ADMIN — PANTOPRAZOLE SODIUM 40 MG: 40 TABLET, DELAYED RELEASE ORAL at 03:12

## 2018-12-07 RX ADMIN — ONDANSETRON 4 MG: 2 INJECTION INTRAMUSCULAR; INTRAVENOUS at 11:12

## 2018-12-07 RX ADMIN — BENZONATATE 200 MG: 100 CAPSULE ORAL at 05:12

## 2018-12-07 RX ADMIN — PROPOFOL 75 MCG/KG/MIN: 10 INJECTION, EMULSION INTRAVENOUS at 07:12

## 2018-12-07 RX ADMIN — ONDANSETRON 4 MG: 2 INJECTION INTRAMUSCULAR; INTRAVENOUS at 07:12

## 2018-12-07 RX ADMIN — PROMETHAZINE HYDROCHLORIDE 12.5 MG: 25 INJECTION INTRAMUSCULAR; INTRAVENOUS at 08:12

## 2018-12-07 RX ADMIN — MEPERIDINE HYDROCHLORIDE 12.5 MG: 50 INJECTION INTRAMUSCULAR; INTRAVENOUS; SUBCUTANEOUS at 10:12

## 2018-12-07 RX ADMIN — CEFAZOLIN SODIUM 2 G: 2 SOLUTION INTRAVENOUS at 05:12

## 2018-12-07 RX ADMIN — EPHEDRINE SULFATE 10 MG: 50 INJECTION INTRAMUSCULAR; INTRAVENOUS; SUBCUTANEOUS at 09:12

## 2018-12-07 RX ADMIN — MORPHINE SULFATE 2 MG: 2 INJECTION, SOLUTION INTRAMUSCULAR; INTRAVENOUS at 12:12

## 2018-12-07 RX ADMIN — HYDROCODONE BITARTRATE AND ACETAMINOPHEN 1 TABLET: 10; 325 TABLET ORAL at 09:12

## 2018-12-07 RX ADMIN — PANTOPRAZOLE SODIUM 40 MG: 40 TABLET, DELAYED RELEASE ORAL at 09:12

## 2018-12-07 RX ADMIN — FENTANYL CITRATE 50 MCG: 50 INJECTION, SOLUTION INTRAMUSCULAR; INTRAVENOUS at 08:12

## 2018-12-07 RX ADMIN — HYDROCODONE BITARTRATE AND ACETAMINOPHEN 1 TABLET: 10; 325 TABLET ORAL at 05:12

## 2018-12-07 RX ADMIN — LORAZEPAM 0.5 MG: 0.5 TABLET ORAL at 09:12

## 2018-12-07 RX ADMIN — MIDAZOLAM HYDROCHLORIDE 5 MG: 5 INJECTION, SOLUTION INTRAMUSCULAR; INTRAVENOUS at 06:12

## 2018-12-07 RX ADMIN — LIDOCAINE HYDROCHLORIDE 50 MG: 20 INJECTION, SOLUTION INTRAVENOUS at 07:12

## 2018-12-07 NOTE — BRIEF OP NOTE
[facility]  Brief Operative Note    SUMMARY     Surgery Date: 12/7/2018     Surgeon(s) and Role:     * Claude S. Williams IV, MD - Primary    Assisting Surgeon: None    Pre-op Diagnosis:  Primary osteoarthritis of one knee, left [M17.12]    Post-op Diagnosis:  Post-Op Diagnosis Codes:     * Primary osteoarthritis of one knee, left [M17.12]    Procedure(s) (LRB):  ARTHROPLASTY, KNEE, TOTAL (Left)    Anesthesia: General    Description of Procedure: Left TKA:  Biomet Vanguard Cemented PS  Femoral size 65, Tibial tray size 71, patella size 32, 10mm polyethylene tray    Description of the findings of the procedure: left knee osteoartrhitis    Estimated Blood Loss: *100cc*         Specimens:   Specimen (12h ago, onward)    None

## 2018-12-07 NOTE — PLAN OF CARE
[facility]    HOME HEALTH ORDERS  FACE TO FACE ENCOUNTER    Patient Name: Valdez Salazar  YOB: 1960    PCP: Thee Corbin MD   PCP Address: [address] [place] [address] [address] [facility]*  PCP Phone Number: [phone]  PCP Fax: [phone]    Encounter Date: 12/07/2018    Admit to Home Health    Diagnoses:  Active Hospital Problems    Diagnosis  POA    *Osteoarthritis of left knee [M17.12]  Yes      Resolved Hospital Problems   No resolved problems to display.       Future Appointments   Date Time Provider Department Center   2/26/2019  9:15 AM Thee Corbin MD [facility]   2/26/2019 10:20 AM Arabella Kelly MD [facility]           I have seen and examined this patient face to face today. My clinical findings that support the need for the home health skilled services and home bound status are the following:  Weakness/numbness causing balance and gait disturbance due to Surgery making it taxing to leave home.  Requiring assistive device to leave home due to unsteady gait caused by  Surgery.    Allergies:  Review of patient's allergies indicates:   Allergen Reactions    Codeine Nausea And Vomiting    Stadol [butorphanol tartrate] Shortness Of Breath     Pt reports stops breathing    Wiley-dur Shortness Of Breath     Stops breathing    Morphine Nausea And Vomiting     Severe.      Pt states can take demerol       Diet: regular diet    Activities: activity as tolerated    Nursing:   SN to complete comprehensive assessment including routine vital signs. Instruct on disease process and s/s of complications to report to MD. Review/verify medication list sent home with the patient at time of discharge  and instruct patient/caregiver as needed. Frequency may be adjusted depending on start of care date.    Notify MD if SBP > 160 or < 90; DBP > 90 or < 50; HR > 120 or < 50; Temp > 101;       CONSULTS:    Physical Therapy to evaluate  and treat. Evaluate for home safety and equipment needs; Establish/upgrade home exercise program. Perform / instruct on therapeutic exercises, gait training, transfer training, and Range of Motion.  Occupational Therapy to evaluate and treat. Evaluate home environment for safety and equipment needs. Perform/Instruct on transfers, ADL training, ROM, and therapeutic exercises.  Aide to provide assistance with personal care, ADLs, and vital signs.        WOUND CARE ORDERS  { WOUND CARE ORDERS:91417}      Medications: Review discharge medications with patient and family and provide education.      Current Discharge Medication List      CONTINUE these medications which have NOT CHANGED    Details   acetaminophen (TYLENOL) 500 MG tablet Take 500 mg by mouth every 6 (six) hours as needed for Pain.      albuterol 90 mcg/actuation inhaler Inhale 2 puffs into the lungs every 4 to 6 hours as needed for Wheezing. Rescue  Qty: 18 g, Refills: 6    Associated Diagnoses: Dyspnea and respiratory abnormalities      azelastine (ASTELIN) 137 mcg (0.1 %) nasal spray 1 spray per nare twice per day, may use up to 4 times daily 1 spray per nare for congestion and post nasal drip  Qty: 90 mL, Refills: 4    Comments: C      benzonatate (TESSALON) 200 MG capsule Take 200 mg by mouth 3 (three) times daily as needed for Cough.      budesonide-formoterol 160-4.5 mcg (SYMBICORT) 160-4.5 mcg/actuation HFAA Inhale 2 puffs into the lungs every 12 (twelve) hours. Controller  Qty: 1 Inhaler, Refills: 11      !! diphenhydrAMINE-aluminum-magnesium hydroxide-simethicone-lidocaine HCl 2% Swish and spit 3 mLs every 4 (four) hours as needed.  Qty: 300 mL, Refills: 3    Comments: Mix benadryl 100 ml, mix maalox 100 ml, mix lidocaine 2% 100 ml  Associated Diagnoses: Chronic cough      !! diphenhydrAMINE-aluminum-magnesium hydroxide-simethicone-lidocaine HCl 2% SWISH AND SPIT 3 MLS EVERY 4 (FOUR) HOURS AS NEEDED.  Refills: 3      !! pantoprazole (PROTONIX)  40 MG tablet Take 1 tablet (40 mg total) by mouth 2 (two) times daily.  Qty: 60 tablet, Refills: 3    Associated Diagnoses: Laryngopharyngeal reflux (LPR)      ranitidine (ZANTAC) 150 MG tablet Take 150 mg by mouth once daily.      tiotropium bromide (SPIRIVA RESPIMAT) 2.5 mcg/actuation Mist Inhale 2 puffs into the lungs once daily. Controller  Qty: 4 g, Refills: 6    Associated Diagnoses: Moderate persistent asthma with acute exacerbation      triamcinolone (NASACORT) 55 mcg nasal inhaler 2 sprays by Nasal route every evening.      ALPRAZolam (XANAX) 1 MG tablet USE AS DIRECTED  Refills: 0      inhalat. spacing dev,sm. mask (AEROCHAMBER PLUS FLOW-VU,S MSK) Spcr 1 Device by Misc.(Non-Drug; Combo Route) route as needed.  Qty: 1 each, Refills: 3    Associated Diagnoses: Dyspnea and respiratory abnormalities      ipratropium (ATROVENT) 0.03 % nasal spray 2 sprays by Nasal route 3 (three) times daily.  Qty: 30 mL, Refills: 6    Associated Diagnoses: Chronic rhinitis      !! pantoprazole (PROTONIX) 40 MG tablet Take 1 tablet (40 mg total) by mouth 2 (two) times daily.  Qty: 180 tablet, Refills: 3    Associated Diagnoses: Hoarseness      valACYclovir (VALTREX) 1000 MG tablet Take 1,000 mg by mouth once daily.  Refills: 3       !! - Potential duplicate medications found. Please discuss with provider.          I certify that this patient is confined to her home and needs intermittent skilled nursing care, physical therapy and occupational therapy.

## 2018-12-07 NOTE — TRANSFER OF CARE
"Anesthesia Transfer of Care Note    Patient: Valdez Salazar    Procedure(s) Performed: Procedure(s) (LRB):  ARTHROPLASTY, KNEE, TOTAL (Left)    Patient location: PACU    Anesthesia Type: general    Transport from OR: Transported from OR on 2-3 L/min O2 by NC with adequate spontaneous ventilation    Post pain: adequate analgesia    Post assessment: no apparent anesthetic complications    Post vital signs: stable    Level of consciousness: awake    Nausea/Vomiting: no nausea/vomiting    Complications: none    Transfer of care protocol was followed      Last vitals:   Visit Vitals  /75 (BP Location: Left arm, Patient Position: Lying)   Pulse 76   Temp 36.6 °C (97.8 °F) (Oral)   Resp 20   Ht 5' 5" (1.651 m)   Wt 90.7 kg (199 lb 16 oz)   SpO2 (!) 93%   Breastfeeding? No   BMI 33.28 kg/m²     "

## 2018-12-07 NOTE — PT/OT/SLP PROGRESS
Physical Therapy      Patient Name:  Valdez Salazar   MRN:  [id number]    Patient not seen today secondary to Patient unwilling to participate screaming and crying in bed reporting 10/10 pain, nurse aware, ortho nurse Jaimee Mckenzie aware, nurse unit supervisor Conchita seo,  Dr. Stone and Dr Lugo informed. Pt already given Morphine.  Will follow-up as situation allows.    Rudy Gutierrez, PT

## 2018-12-07 NOTE — CONSULTS
Consult Note  IM    Consult Requested By: Claude S. Williams IV, MD  Reason for Consult: osteoarthritis, PONV, asthma, reflux and cough    SUBJECTIVE:     History of Present Illness:   58 y.o. female presents with a scheduled left knee repair. Epic and paper chart reviewed prior to eval.  Patient having increased pain- surgeon contacted by nursing staff to secure new orders for pain control. ROS limited/HPI limited due to pain. Family at bedside.    Past Medical History:   Diagnosis Date    Acid reflux     Allergic sinusitis     Asthma     Hoarseness     PONV (postoperative nausea and vomiting)      Past Surgical History:   Procedure Laterality Date    ADENOIDECTOMY      cesarian section      ctr      ELBOW SURGERY      and hardware removal    HYSTERECTOMY      INCONTINENCE SURGERY      INJECTION-JOINT CARPAL TUNNEL Left 6/5/2015    [facility], [facility] at [facility]    KNEE ARTHROSCOPY      rectocele      RELEASE-CARPAL TUNNEL Right 6/5/2015    [facility] at [facility] OR    TONSILLECTOMY       Family History   Problem Relation Age of Onset    Allergies Mother     No Known Problems Father     Asthma Sister     Asthma Brother      Social History     Tobacco Use    Smoking status: Never Smoker    Smokeless tobacco: Never Used   Substance Use Topics    Alcohol use: Yes     Comment: occas    Drug use: No       Review of patient's allergies indicates:   Allergen Reactions    Codeine Nausea And Vomiting    Stadol [butorphanol tartrate] Shortness Of Breath     Pt reports stops breathing    Wiley-dur Shortness Of Breath     Stops breathing    Morphine Nausea And Vomiting     Severe.      Pt states can take demerol        Review of Systems:  Constitutional: No fever or chills  Respiratory: No cough or shortness of breath  Cardiovascular: No chest pain or palpitations  Gastrointestinal: No nausea or vomiting  Neurological: No confusion or weakness    OBJECTIVE:      Vital Signs (Most Recent)  Temp: 97.4 °F (36.3 °C) (12/07/18 1010)  Pulse: 89 (12/07/18 1030)  Resp: 17 (12/07/18 1030)  BP: 106/65 (12/07/18 1030)  SpO2: 95 % (12/07/18 1030)    Vital Signs Range (Last 24H):  Temp:  [97.4 °F (36.3 °C)-97.8 °F (36.6 °C)]   Pulse:  [76-96]   Resp:  [17-20]   BP: (106-116)/(58-75)   SpO2:  [93 %-97 %]       Intake/Output Summary (Last 24 hours) at 12/7/2018 1211  Last data filed at 12/7/2018 0950  Gross per 24 hour   Intake 2200 ml   Output 550 ml   Net 1650 ml       Physical Exam:  General appearance: Well developed, well nourished  Eyes:  Conjunctivae/corneas clear. PERRL.  Lungs: Normal respiratory effort,   clear to auscultation bilaterally   Heart: Regular rate and rhythm, S1, S2 normal, no murmur, rub or joann.  Abdomen: Soft, non-tender non-distended; bowel sounds normal; no masses,  no organomegaly  Extremities: No cyanosis or clubbing. No edema.  +2 pulses BLE  Skin: Skin color, texture, turgor normal. No rashes or lesions, ACE wrap left knee CDI  Neurologic: Normal strength and tone. No focal numbness or weakness   Urrutia    Laboratory:    Reviewed    Diagnostic Results:      ASSESSMENT/PLAN:     1. Left knee repair (M17.12): per therapy and ortho teams  2. PONV (R11.2, Z98.890): anti-emetics  3. Asthma (J45.909): continue home med  4. Acid reflux (K21.9): continue PPI  5. Cough (R05): continue tessalon  6. DVT prophy: Lovenox 40 mg SQ, NAHUN and SCD per ortho      Plan: Thanks for consult, See above recommendations and orders. Will follow along.  Case reviewed with NP. Labs reviewed. Agree with A/P as written.

## 2018-12-07 NOTE — PLAN OF CARE
SW met with pt at bedside to complete discharge assessment.  Pt in pain, spouse answered questions and pt uses [facility] on [address].  Pt has SC, BSC, RW. SW gave list of HH agencies and spouse selected [facility] and signed choice form.     12/07/18 2607   Discharge Assessment   Assessment Type Discharge Planning Assessment   Confirmed/corrected address and phone number on facesheet? Yes   Assessment information obtained from? Patient   Communicated expected length of stay with patient/caregiver no   Prior to hospitilization cognitive status: Alert/Oriented   Prior to hospitalization functional status: Independent   Current cognitive status: Alert/Oriented   Current Functional Status: Assistive Equipment;Needs Assistance   Lives With spouse;child(maría), adult   Able to Return to Prior Arrangements yes   Is patient able to care for self after discharge? Unable to determine at this time (comments)   Readmission Within The Last 30 Days no previous admission in last 30 days   Equipment Currently Used at Home walker, rolling;bedside commode;shower chair   Do you have any problems affording any of your prescribed medications? No   Is the patient taking medications as prescribed? yes   Does the patient have transportation home? Yes   Transportation Available family or friend will provide   Does the patient receive services at the [facility]? No   Discharge Plan A Home Health   Patient/Family In Agreement With Plan yes

## 2018-12-07 NOTE — PT/OT/SLP PROGRESS
Occupational Therapy      Patient Name:  Valdez Salazar   MRN:  [id number]    Patient not seen today secondary to Other (Occupational Therapy  Not Seen    Valdez Salazar   MRN: [id number]     Patient not seen for Occupational Therapy today due to ( ) departmental protocol for elective surgery patients.    Patient with Primary osteoarthritis of one knee, left [M17.12], s/p Procedure(s):  ARTHROPLASTY, KNEE, TOTAL 12/7/2018 who will be seen for Occupational Therapy evaluation POD#1.    SHREYA Marvin   12/7/2018

## 2018-12-07 NOTE — PLAN OF CARE
12/07/18 1536   Final Note   Assessment Type Final Discharge Note   Anticipated Discharge Disposition Home-Health  ([facility])   What phone number can be called within the next 1-3 days to see how you are doing after discharge? ([phone])   Hospital Follow Up  Appt(s) scheduled? No   Discharge plans and expectations educations in teach back method with documentation complete? No

## 2018-12-07 NOTE — ANESTHESIA POSTPROCEDURE EVALUATION
"Anesthesia Post Evaluation    Patient: Valdez Salazar    Procedure(s) Performed: Procedure(s) (LRB):  ARTHROPLASTY, KNEE, TOTAL (Left)    Final Anesthesia Type: spinal  Patient location during evaluation: PACU  Patient participation: Yes- Able to Participate  Level of consciousness: awake and alert  Post-procedure vital signs: reviewed and stable  Pain management: adequate  Airway patency: patent  PONV status at discharge: No PONV  Anesthetic complications: no      Cardiovascular status: hemodynamically stable  Respiratory status: unassisted  Hydration status: euvolemic  Follow-up not needed.        Visit Vitals  /65   Pulse 89   Temp 36.3 °C (97.4 °F) (Oral)   Resp 17   Ht 5' 5" (1.651 m)   Wt 90.7 kg (199 lb 16 oz)   SpO2 95%   Breastfeeding? No   BMI 33.28 kg/m²       Pain/Natty Score: Pain Assessment Performed: Yes (12/7/2018  6:00 AM)  Presence of Pain: denies (12/7/2018 10:10 AM)  Pain Rating Prior to Med Admin: 5 (12/7/2018 10:40 AM)  Natty Score: 9 (12/7/2018 10:10 AM)        "

## 2018-12-07 NOTE — PLAN OF CARE
"Problem: Physical Therapy Goal  Goal: Physical Therapy Goal  Goals to be met by 12-14-18.  1. Sup<>sit mod I  2. Sit<>stand with RW mod I  3. amb 150' with RW mod I  4. Up/down 6" curb step with RW mod I  -    Comments: Physical therapy eval completed  Recommend home with  PT.  No equipment needed.   "

## 2018-12-07 NOTE — ANESTHESIA PROCEDURE NOTES
Spinal    Diagnosis: Osteo knee  Patient location during procedure: holding area  Start time: 12/7/2018 7:16 AM  Timeout: 12/7/2018 7:15 AM  End time: 12/7/2018 7:28 AM  Staffing  Anesthesiologist: Salvador Marquez MD  Performed: anesthesiologist   Preanesthetic Checklist  Completed: patient identified, site marked, surgical consent, pre-op evaluation, timeout performed, IV checked, risks and benefits discussed and monitors and equipment checked  Spinal Block  Patient position: sitting  Prep: ChloraPrep  Patient monitoring: heart rate, cardiac monitor and continuous pulse ox  Approach: midline  Location: L3-4  Injection technique: single shot  Needle  Needle type: Kaylah   Needle gauge: 22 G  Needle length: 3.5 in  Additional Documentation: incremental injection  Needle localization: anatomical landmarks  Assessment  Sensory level: T5   Dermatomal levels determined by alcohol wipe  Ease of block: easy  Patient's tolerance of the procedure: comfortable throughout block

## 2018-12-07 NOTE — OR NURSING
Transported pt back to Outpatient Surgery per Dr. Marquez's request.  Pt having panic attack & needs surgical consent with Dr. Stone prior to sedation.  Pt in Rm 4649, called pt's  to return to pt's bedside.  Emotional support provided for patient.  Notified OR desk to have Dr. Stone to consent patient in Outpatient ASAP.  Pt &  updated on plan of care.

## 2018-12-07 NOTE — PLAN OF CARE
Spoke with Dr. Stone [phone]. Recommends HH for pt with PT/OT.   Template shared with MD.     Referrals sent in [facility] per pt request.     SW to continue to follow

## 2018-12-07 NOTE — PT/OT/SLP EVAL
Physical Therapy Evaluation    Patient Name:  Valdez Salazar   MRN:  [id number]    Recommendations:     Discharge Recommendations:  home, home with home health, home health PT   Discharge Equipment Recommendations: none   Barriers to discharge: None    Assessment:     Valdez Salazar is a 58 y.o. female admitted with a medical diagnosis of Osteoarthritis of left knee.  She presents with the following impairments/functional limitations:  weakness, impaired functional mobilty, gait instability, pain, decreased coordination, decreased lower extremity function, impaired balance.  Pt s/p TKR L now with impaired functional mobility.  Rehab Prognosis:  excellent; patient would benefit from acute skilled PT services to address these deficits and reach maximum level of function.      Recent Surgery: Procedure(s) (LRB):  ARTHROPLASTY, KNEE, TOTAL (Left) Day of Surgery    Plan:     During this hospitalization, patient to be seen   to address the above listed problems via gait training, therapeutic activities, therapeutic exercises  · Plan of Care Expires:  01/07/19   Plan of Care Reviewed with: patient, spouse    Subjective     Communicated with patient and juwane prior to session.  Patient found in bed supine upon PT entry to room, agreeable to evaluation.      Chief Complaint: high level of pain (crying and screaming)  Patient comments/goals: I don't think I can do this.  Pain/Comfort:  · Pain Rating 1: 9/10  · Location - Side 1: Left  · Location 1: knee  · Pain Addressed 1: Distraction, Pre-medicate for activity  · Pain Rating Post-Intervention 1: 8/10    Patients cultural, spiritual, Pentecostal conflicts given the current situation: none mentioned    Living Environment:  Lives with  in raised house with elevator.   house  Prior to admission, patients level of function was independent.  Patient has the following equipment: walker, rolling, bedside commode, shower chair.  DME owned (not currently used): none.   "Upon discharge, patient will have assistance from .    Objective:     Patient found with: peripheral IV     General Precautions: Standard,     Orthopedic Precautions:LLE weight bearing as tolerated   Braces:       Exams:  · Cognitive Exam:  Patient is oriented to Person, Place, Time and Situation  · Fine Motor Coordination:    · -       Impaired  LLE heel shin 2/2 pain  · Gross Motor Coordination:  WFL  · Postural Exam:  Patient presented with the following abnormalities:    · -       Rounded shoulders  · -       Forward head  · Sensation:    · -       Intact  · Skin Integrity/Edema:      · -       Skin integrity: Visible skin intact  · -       Edema: Mild L LE  · RLE ROM: WNL  · RLE Strength: WNL  · LLE ROM: WFL except limited by pain  · LLE Strength: WFL except limited by pain    Functional Mobility:  · Bed Mobility:     · Supine to Sit: minimum assistance  · Transfers:     · Sit to Stand:  minimum assistance with rolling walker  · Gait: amb 20' with RW and min A  · Balance: fair standing dynamic balance    AM-PAC 6 CLICK MOBILITY  Total Score:18     Patient left with bed in chair position with all lines intact, call button in reach, bed alarm on, nurse notified and   present.    GOALS:   Multidisciplinary Problems     Physical Therapy Goals        Problem: Physical Therapy Goal    Goal Priority Disciplines Outcome Goal Variances Interventions   Physical Therapy Goal     PT, PT/OT      Description:  Goals to be met by 12-14-18.  1. Sup<>sit mod I  2. Sit<>stand with RW mod I  3. amb 150' with RW mod I  4. Up/down 6" curb step with RW mod I                    History:     Past Medical History:   Diagnosis Date    Acid reflux     Allergic sinusitis     Asthma     Hoarseness     PONV (postoperative nausea and vomiting)        Past Surgical History:   Procedure Laterality Date    ADENOIDECTOMY      cesarian section      ctr      ELBOW SURGERY      and hardware removal    HYSTERECTOMY      " INCONTINENCE SURGERY      INJECTION-JOINT CARPAL TUNNEL Left 6/5/2015    [facility]    KNEE ARTHROSCOPY      rectocele      RELEASE-CARPAL TUNNEL Right 6/5/2015    [facility] at [facility] OR    TONSILLECTOMY         Clinical Decision Making:     History  Co-morbidities and personal factors that may impact the plan of care Examination  Body Structures and Functions, activity limitations and participation restrictions that may impact the plan of care Clinical Presentation   Decision Making/ Complexity Score   Co-morbidities:   [] Time since onset of injury / illness / exacerbation  [] Status of current condition  []Patient's cognitive status and safety concerns    [] Multiple Medical Problems (see med hx)  Personal Factors:   [] Patient's age  [] Prior Level of function   [] Patient's home situation (environment and family support)  [] Patient's level of motivation  [] Expected progression of patient      HISTORY:(criteria)    [] 04504 - no personal factors/history    [] 51535 - has 1-2 personal factor/comorbidity     [] 77518 - has >3 personal factor/comorbidity     Body Regions:  [] Objective examination findings  [] Head     []  Neck  [] Trunk   [] Upper Extremity  [] Lower Extremity    Body Systems:  [] For communication ability, affect, cognition, language, and learning style: the assessment of the ability to make needs known, consciousness, orientation (person, place, and time), expected emotional /behavioral responses, and learning preferences (eg, learning barriers, education  needs)  [] For the neuromuscular system: a general assessment of gross coordinated movement (eg, balance, gait, locomotion, transfers, and transitions) and motor function  (motor control and motor learning)  [] For the musculoskeletal system: the assessment of gross symmetry, gross range of motion, gross strength, height, and weight  [] For the integumentary system: the  assessment of pliability(texture), presence of scar formation, skin color, and skin integrity  [] For cardiovascular/pulmonary system: the assessment of heart rate, respiratory rate, blood pressure, and edema     Activity limitations:    [] Patient's cognitive status and saf ety concerns          [] Status of current condition      [] Weight bearing restriction  [] Cardiopulmunary Restriction    Participation Restrictions:   [] Goals and goal agreement with the patient     [] Rehab potential (prognosis) and probable outcome      Examination of Body System: (criteria)    [] 98314 - addressing 1-2 elements    [] 76704 - addressing a total of 3 or more elements     [] 13238 -  Addressing a total of 4 or more elements         Clinical Presentation: (criteria)  Choose one     On examination of body system using standardized tests and measures patient presents with (CHOOSE ONE) elements from any of the following: body structures and functions, activity limitations, and/or participation restrictions.  Leading to a clinical presentation that is considered (CHOOSE ONE)                              Clinical Decision Making  (Eval Complexity):  Choose One     Time Tracking:     PT Received On: 12/07/18  PT Start Time: 1551     PT Stop Time: 1611  PT Total Time (min): 20 min     Billable Minutes: Evaluation 20      Rudy Gutierrez, PT  12/07/2018

## 2018-12-08 LAB
ANION GAP SERPL CALC-SCNC: 7 MMOL/L
BASOPHILS # BLD AUTO: 0.02 K/UL
BASOPHILS NFR BLD: 0.2 %
BUN SERPL-MCNC: 6 MG/DL
CALCIUM SERPL-MCNC: 8.8 MG/DL
CHLORIDE SERPL-SCNC: 102 MMOL/L
CO2 SERPL-SCNC: 26 MMOL/L
CREAT SERPL-MCNC: 0.7 MG/DL
DIFFERENTIAL METHOD: ABNORMAL
EOSINOPHIL # BLD AUTO: 0 K/UL
EOSINOPHIL NFR BLD: 0.3 %
ERYTHROCYTE [DISTWIDTH] IN BLOOD BY AUTOMATED COUNT: 13.1 %
EST. GFR  (AFRICAN AMERICAN): >60 ML/MIN/1.73 M^2
EST. GFR  (NON AFRICAN AMERICAN): >60 ML/MIN/1.73 M^2
GLUCOSE SERPL-MCNC: 126 MG/DL
HCT VFR BLD AUTO: 36.5 %
HGB BLD-MCNC: 11.7 G/DL
LYMPHOCYTES # BLD AUTO: 1.1 K/UL
LYMPHOCYTES NFR BLD: 11.8 %
MCH RBC QN AUTO: 29.3 PG
MCHC RBC AUTO-ENTMCNC: 32.1 G/DL
MCV RBC AUTO: 92 FL
MONOCYTES # BLD AUTO: 1 K/UL
MONOCYTES NFR BLD: 10.3 %
NEUTROPHILS # BLD AUTO: 7.1 K/UL
NEUTROPHILS NFR BLD: 77.2 %
PLATELET # BLD AUTO: 196 K/UL
PMV BLD AUTO: 10.4 FL
POTASSIUM SERPL-SCNC: 4.2 MMOL/L
RBC # BLD AUTO: 3.99 M/UL
SODIUM SERPL-SCNC: 135 MMOL/L
WBC # BLD AUTO: 9.21 K/UL

## 2018-12-08 PROCEDURE — 97116 GAIT TRAINING THERAPY: CPT

## 2018-12-08 PROCEDURE — 80048 BASIC METABOLIC PNL TOTAL CA: CPT

## 2018-12-08 PROCEDURE — 97530 THERAPEUTIC ACTIVITIES: CPT

## 2018-12-08 PROCEDURE — 94640 AIRWAY INHALATION TREATMENT: CPT

## 2018-12-08 PROCEDURE — 97166 OT EVAL MOD COMPLEX 45 MIN: CPT

## 2018-12-08 PROCEDURE — G8988 SELF CARE GOAL STATUS: HCPCS | Mod: CJ

## 2018-12-08 PROCEDURE — 99900035 HC TECH TIME PER 15 MIN (STAT)

## 2018-12-08 PROCEDURE — 11000001 HC ACUTE MED/SURG PRIVATE ROOM

## 2018-12-08 PROCEDURE — 63600175 PHARM REV CODE 636 W HCPCS: Performed by: ORTHOPAEDIC SURGERY

## 2018-12-08 PROCEDURE — 85025 COMPLETE CBC W/AUTO DIFF WBC: CPT

## 2018-12-08 PROCEDURE — G8987 SELF CARE CURRENT STATUS: HCPCS | Mod: CK

## 2018-12-08 PROCEDURE — 25000003 PHARM REV CODE 250: Performed by: ORTHOPAEDIC SURGERY

## 2018-12-08 PROCEDURE — 36415 COLL VENOUS BLD VENIPUNCTURE: CPT

## 2018-12-08 PROCEDURE — 25000003 PHARM REV CODE 250: Performed by: NURSE PRACTITIONER

## 2018-12-08 PROCEDURE — 97535 SELF CARE MNGMENT TRAINING: CPT

## 2018-12-08 PROCEDURE — 97110 THERAPEUTIC EXERCISES: CPT

## 2018-12-08 RX ORDER — MEPERIDINE HYDROCHLORIDE 50 MG/1
50 TABLET ORAL EVERY 4 HOURS PRN
Status: DISPENSED | OUTPATIENT
Start: 2018-12-08 | End: 2018-12-09

## 2018-12-08 RX ORDER — PROMETHAZINE HYDROCHLORIDE 25 MG/1
25 TABLET ORAL EVERY 6 HOURS PRN
Status: DISCONTINUED | OUTPATIENT
Start: 2018-12-08 | End: 2018-12-09 | Stop reason: HOSPADM

## 2018-12-08 RX ADMIN — PROMETHAZINE HYDROCHLORIDE 12.5 MG: 25 INJECTION INTRAMUSCULAR; INTRAVENOUS at 02:12

## 2018-12-08 RX ADMIN — HYDROCODONE BITARTRATE AND ACETAMINOPHEN 1 TABLET: 10; 325 TABLET ORAL at 07:12

## 2018-12-08 RX ADMIN — FAMOTIDINE 20 MG: 20 TABLET ORAL at 08:12

## 2018-12-08 RX ADMIN — HYDROCODONE BITARTRATE AND ACETAMINOPHEN 1 TABLET: 10; 325 TABLET ORAL at 02:12

## 2018-12-08 RX ADMIN — CELECOXIB 200 MG: 200 CAPSULE ORAL at 10:12

## 2018-12-08 RX ADMIN — PANTOPRAZOLE SODIUM 40 MG: 40 TABLET, DELAYED RELEASE ORAL at 10:12

## 2018-12-08 RX ADMIN — PANTOPRAZOLE SODIUM 40 MG: 40 TABLET, DELAYED RELEASE ORAL at 08:12

## 2018-12-08 RX ADMIN — MUPIROCIN 1 G: 20 OINTMENT TOPICAL at 08:12

## 2018-12-08 RX ADMIN — CEFAZOLIN SODIUM 2 G: 2 SOLUTION INTRAVENOUS at 07:12

## 2018-12-08 RX ADMIN — ONDANSETRON 8 MG: 8 TABLET, ORALLY DISINTEGRATING ORAL at 07:12

## 2018-12-08 RX ADMIN — FAMOTIDINE 20 MG: 20 TABLET ORAL at 10:12

## 2018-12-08 RX ADMIN — BUDESONIDE AND FORMOTEROL FUMARATE DIHYDRATE 2 PUFF: 160; 4.5 AEROSOL RESPIRATORY (INHALATION) at 08:12

## 2018-12-08 RX ADMIN — MEPERIDINE HYDROCHLORIDE 50 MG: 50 TABLET ORAL at 01:12

## 2018-12-08 RX ADMIN — MEPERIDINE HYDROCHLORIDE 50 MG: 50 TABLET ORAL at 08:12

## 2018-12-08 RX ADMIN — BUDESONIDE AND FORMOTEROL FUMARATE DIHYDRATE 2 PUFF: 160; 4.5 AEROSOL RESPIRATORY (INHALATION) at 10:12

## 2018-12-08 RX ADMIN — MUPIROCIN 1 G: 20 OINTMENT TOPICAL at 10:12

## 2018-12-08 RX ADMIN — CELECOXIB 200 MG: 200 CAPSULE ORAL at 08:12

## 2018-12-08 RX ADMIN — LORAZEPAM 0.5 MG: 0.5 TABLET ORAL at 07:12

## 2018-12-08 RX ADMIN — ENOXAPARIN SODIUM 40 MG: 100 INJECTION SUBCUTANEOUS at 05:12

## 2018-12-08 NOTE — PLAN OF CARE
Problem: Patient Care Overview  Goal: Plan of Care Review  Outcome: Ongoing (interventions implemented as appropriate)  Pt in no distress on RA, inhaler given, IS done. Will continue to monitor.

## 2018-12-08 NOTE — PT/OT/SLP PROGRESS
Physical Therapy Treatment    Patient Name:  Valdez Salazar   MRN:  [id number]    Recommendations:     Discharge Recommendations:  home, home with home health, home health PT, home health OT   Discharge Equipment Recommendations: none   Barriers to discharge: None    Assessment:     Valdez Salazar is a 58 y.o. female admitted with a medical diagnosis of Osteoarthritis of left knee.  She presents with the following impairments/functional limitations:  weakness, impaired functional mobilty, gait instability, impaired balance, decreased coordination, impaired endurance, decreased lower extremity function, pain.  Significant improvement in tolerance for activity this PM as a result of pain under better control.  Able to amb to BR on/off commode and amb in hallway 100' with RW.       Rehab Prognosis:  excellent; patient would benefit from acute skilled PT services to address these deficits and reach maximum level of function.      Recent Surgery: Procedure(s) (LRB):  ARTHROPLASTY, KNEE, TOTAL (Left) 1 Day Post-Op    Plan:     During this hospitalization, patient to be seen BID to address the above listed problems via gait training, therapeutic activities, therapeutic exercises  · Plan of Care Expires:  01/07/19   Plan of Care Reviewed with: patient    Subjective     Communicated with patient prior to session.  Patient found in bedside chair upon PT entry to room, agreeable to treatment.      Chief Complaint: L knee pain  Patient comments/goals: requesting use of commode  Pain/Comfort:  · Pain Rating 1: 6/10  · Location - Side 1: Left  · Location 1: knee  · Pain Addressed 1: Pre-medicate for activity, Distraction  · Pain Rating Post-Intervention 1: 5/10    Patients cultural, spiritual, Yarsanism conflicts given the current situation: none mentioned    Objective:     Patient found with: cryotherapy, peripheral IV, SCD     General Precautions: Standard, fall   Orthopedic Precautions:LLE weight bearing as tolerated  "  Braces: N/A     Functional Mobility:  · Bed Mobility:     · Sit to Supine: minimum assistance  · Transfers:     · Sit to Stand:  supervision with rolling walker  · Gait: amb 25' and 100' with RW and supervision v/c to improve gait  · Balance: good standing dynamic balance      AM-PAC 6 CLICK MOBILITY  Turning over in bed (including adjusting bedclothes, sheets and blankets)?: 3  Sitting down on and standing up from a chair with arms (e.g., wheelchair, bedside commode, etc.): 3  Moving from lying on back to sitting on the side of the bed?: 3  Moving to and from a bed to a chair (including a wheelchair)?: 3  Need to walk in hospital room?: 3  Climbing 3-5 steps with a railing?: 3  Basic Mobility Total Score: 18       Therapeutic Activities and Exercises:   in BS chair apx 20; slr with assist x 15    Patient left supine with all lines intact, call button in reach, nurse notified,   present and in scd and cryotherapy..    GOALS:   Multidisciplinary Problems     Physical Therapy Goals        Problem: Physical Therapy Goal    Goal Priority Disciplines Outcome Goal Variances Interventions   Physical Therapy Goal     PT, PT/OT      Description:  Goals to be met by 12-14-18.  1. Sup<>sit mod I  2. Sit<>stand with RW mod I  3. amb 150' with RW mod I  4. Up/down 6" curb step with RW mod I                    Time Tracking:     PT Received On: 12/08/18  PT Start Time: 1416     PT Stop Time: 1448  PT Total Time (min): 32 min     Billable Minutes: Gait Training 17 and Therapeutic Exercise 15    Treatment Type: Treatment  PT/PTA: PT           Rudy Gutierrez, PT  12/08/2018  "

## 2018-12-08 NOTE — PT/OT/SLP EVAL
Occupational Therapy   Evaluation/Treatment    Name: Valdez Salazar  MRN: [id number]  Admitting Diagnosis:  Osteoarthritis of left knee 1 Day Post-Op, s/p Left TKA    Recommendations:     Discharge Recommendations: home health PT, home health OT  Discharge Equipment Recommendations:  none  Barriers to discharge:  None    History:     Occupational Profile:  Living Environment: lives with her  in a raised house with elevator access  Previous level of function: ambulating with RW, drives a car, assist with self-care PRN,  performs most household tasks  Roles and Routines: None  Equipment Used at Home:  walker, rolling, bedside commode, shower chair  Assistance upon Discharge: the patient's  is available to assist with her care at discharge    Past Medical History:   Diagnosis Date    Acid reflux     Allergic sinusitis     Asthma     Hoarseness     PONV (postoperative nausea and vomiting)        Past Surgical History:   Procedure Laterality Date    ADENOIDECTOMY      cesarian section      ctr      ELBOW SURGERY      and hardware removal    HYSTERECTOMY      INCONTINENCE SURGERY      INJECTION-JOINT CARPAL TUNNEL Left 6/5/2015    Performed by Claude S. Williams IV, [facility] at [facility]    KNEE ARTHROSCOPY      rectocele      RELEASE-CARPAL TUNNEL Right 6/5/2015    Performed by Claude S. Williams IV, MD at [facility]    TONSILLECTOMY         Subjective     Chief Complaint: knee pain  Patient/Family Comments/goals: regain mobility without pain   Pain/Comfort:  · Pain Rating 1: 7/10  · Location - Side 1: Left  · Location - Orientation 1: generalized  · Location 1: knee(and thigh)  · Pain Addressed 1: Pre-medicate for activity, Reposition, Cessation of Activity, Nurse notified(pain management training)  · Pain Rating Post-Intervention 1: 5/10    Patients cultural, spiritual, Taoism conflicts given the current situation: None    Objective:     Communicated with: patient and her nurse prior to  session.  Patient found with: all lines intact and patient's  present and cryotherapy, peripheral IV, SCD, FCD upon OT entry to room.    General Precautions: Standard, fall   Orthopedic Precautions:LLE weight bearing as tolerated   Braces: N/A     Occupational Performance:    Bed Mobility:    · Mod A supine>sit EOB (very slow, effortful, pain limited)    Functional Mobility/Transfers:   · Min A sit><stand with RW  · Min A toilet transfer with RW  · Min A chair transfer with RW    Ambulated bed>bathroom>Chair with RW Min A (assist for safety and RW and foot placement)    Activities of Daily Living:  · MI self-feeding seated in chair  · Min A G/H (wash hands) standing at sink (very limited standing tolerance)  · Min A UBD (don hospital gown as robe) sitting EOB  · Total A LBD (doff/don socks) seated EOB  · Min A toilet hygiene (attempted-not able to void)    Cognitive/Visual Perceptual:  Cognitive/Psychosocial Skills:     -       Oriented to: Person, Place, Time and Situation   -       Follows Commands/attention:Follows one-step commands with slowed pace and repetition of command  -       Communication: clear/fluent  -       Memory: Impaired STM  -       Safety awareness/insight to disability: impaired   -       Mood/Affect/Coping skills/emotional control: Tearful, Cooperative, Lethargic and Agitated  Visual/Perceptual:      -Intact reading glasses    Physical Exam:  Postural examination/scapula alignment:    -       Rounded shoulders  -       Forward head  -       Posterior pelvic tilt  -       Abnormal trunk flexion  Skin integrity: Visible skin intact  Edema:  Moderate Left knee  Sensation:    -       Intact for light touch both hands  Motor Planning:    -       fair, high anxiety level limited performance  Dominant hand:    -       Right  UE ROM: WFL BUE  UE Strength:WFL BUE  Hand Function: WFL both hands  Gross motor coordination: sitting balance WFL, poor LE balance-gait with difficulty taking weight on  "LLE during ambulation with RW, very low activity tolerance    AM PAC 6 Click ADL:  AM PAC Total Score: 15    Treatment & Education:  Anxiety and pain management training ( instructed on how to address pt reactiveness)  Bed mobility, gait training, transfer training, RW use and safety, integrating LE exercise into mobility tasks, safety with standing ADLs  Education:    Patient left up in chair with all lines intact, call button in reach, nurse notified and patient's  present    Assessment:     Valdez Salazar is a 58 y.o. female with a medical diagnosis of Osteoarthritis of left knee,s/p Left TKA   She presents with the following performance deficits affecting function: weakness, impaired endurance, impaired self care skills, impaired functional mobilty, decreased safety awareness, pain, gait instability, impaired balance, orthopedic precautions, edema, decreased ROM, decreased lower extremity function(anxiety).  OT treatment was provided to address patient safety effect of anxiety-pain overreactions on functional mobility and self-care tasks.    Rehab Prognosis: Good; patient would benefit from acute skilled OT services to address these deficits and reach maximum level of function.         Clinical Decision Makin.  OT Mod:  "Pt evaluation falls under moderate complexity for evaluation coding due to identification of 3-5 performance deficits noted as stated above. Eval required Min/Mod assistance to complete on this date and detailed assessment(s) were utilized. Moreover, an expanded review of history and occupational profile obtained with additional review of cognitive, physical and psychosocial hx."  Extra time, slow pacing of activity and modification of task presentation needed along with close  Supervision for safety and to monitor physiology status.    Plan:     Patient to be seen daily to address the above listed problems via self-care/home management, therapeutic activities, " therapeutic exercises, cognitive retraining  · Plan of Care Expires: 12/08/18  · Plan of Care Reviewed with: patient    This Plan of care has been discussed with the patient who was involved in its development and understands and is in agreement with the identified goals and treatment plan    GOALS:   Multidisciplinary Problems     Occupational Therapy Goals        Problem: Occupational Therapy Goal    Goal Priority Disciplines Outcome Interventions   Occupational Therapy Goal     OT, PT/OT Ongoing (interventions implemented as appropriate)    Description:  Goals to be met by 1/8/19  1. SBA G/H standing at sink  2. Independent UBD  3. Mod LBD  4. Min A self-management of anxiety pain attacks during treatment  5. Discuss home activity recommendations                     Time Tracking:     OT Date of Treatment: 12/08/18  OT Start Time: 0810  OT Stop Time: 0926  OT Total Time (min): 76 min    Billable Minutes:Evaluation 34  Therapeutic Activity 28  Self-care 14    SHREYA Marvin  12/8/2018

## 2018-12-08 NOTE — PLAN OF CARE
Discharge planner notified [facility] of patient discharge disposition

## 2018-12-08 NOTE — NURSING
"I called on call Dr. Lugo in regards to patient pain and vomiting, 450ml  pink emesis as patient states"all I have been drinking is cranberry juice". Dr. Lugo prescribed phenergan 12.5 mg every 6 hours prn. Will continue to monitor.  "

## 2018-12-08 NOTE — PLAN OF CARE
"Problem: Physical Therapy Goal  Goal: Physical Therapy Goal  Goals to be met by 12-14-18.  1. Sup<>sit mod I  2. Sit<>stand with RW mod I  3. amb 150' with RW mod I  4. Up/down 6" curb step with RW mod I   -    Comments: Significant improvement in tolerance for activity this PM as a result of pain under better control.  Able to amb to BR on/off commode and amb in hallway 100' with RW.   "

## 2018-12-08 NOTE — NURSING
Dr. Stone called an notified of pt's continued complaints of pain 10/10, moaning, crying and thrashing in bed. New order for Morphine 4mg IM obtained.

## 2018-12-08 NOTE — NURSING
BOLA Hermosillo notified Dr. Stone of patient continued c/o maximum pain after morphine administration.

## 2018-12-08 NOTE — PLAN OF CARE
Problem: Occupational Therapy Goal  Goal: Occupational Therapy Goal  Goals to be met by 1/8/19  1. SBA G/H standing at sink  2. Independent UBD  3. Mod LBD  4. Min A self-management of anxiety pain attacks during treatment  5. Discuss home activity recommendations   Outcome: Ongoing (interventions implemented as appropriate)  OT evaluation completed with treatment initiated.  Recommend Home Health PT and OT at discharge.  DME: None. SHREYA Marvin  12/8/2018

## 2018-12-08 NOTE — PROGRESS NOTES
Progress Note  Medicine    Admit Date: 12/7/2018   LOS: 1 day     SUBJECTIVE:     Follow-up For:      Interval History:     Patient with significant pain overnight accompanied by nausea and vomiting likely due to pain medications.  She is somewhat groggy this morning.  She did not participate in therapy this morning.    Review of Systems:  Constitutional: No fever or chills  Respiratory: No cough or shortness of breath  Cardiovascular: No chest pain or palpitations  Gastrointestinal: No nausea or vomiting  Neurological: No confusion or weakness    OBJECTIVE:     Vital Signs Range (Last 24H):  Vitals:    12/08/18 0814   BP:    Pulse: 81   Resp: 17   Temp:        Temp:  [97.4 °F (36.3 °C)-99.1 °F (37.3 °C)]   Pulse:  []   Resp:  [14-18]   BP: (118-171)/(56-80)   SpO2:  [93 %-99 %]     I & O (Last 24H):    Intake/Output Summary (Last 24 hours) at 12/8/2018 1155  Last data filed at 12/8/2018 0002  Gross per 24 hour   Intake --   Output 1725 ml   Net -1725 ml       Physical Exam:  General appearance: Well developed, well nourished  Eyes:  Conjunctivae/corneas clear. PERRL.  Lungs: Normal respiratory effort,   clear to auscultation bilaterally   Heart: Regular rate and rhythm, S1, S2 normal, no murmur, rub or joann.  Abdomen: Soft, non-tender non-distended; bowel sounds normal; no masses,  no organomegaly  Extremities: No cyanosis or clubbing. No edema.  DP pulses 2+/4+ bilaterally. Ice sleeve on left knee.  Skin: Skin color, texture, turgor normal. No rashes or lesions      Laboratory Data:    Recent Labs   Lab 12/08/18  0457   *   K 4.2      CO2 26   BUN 6   CREATININE 0.7   CALCIUM 8.8     Lab Results   Component Value Date    CALCIUM 8.8 12/08/2018     No results found for: IRON, TIBC, FERRITIN, SATURATEDIRO    Medications:  Medication list was reviewed and changes noted under Assessment/Plan.    Diagnostic Results:  Reviewed most recent CT/US/Echo/MRI    ASSESSMENT/PLAN:       1. Left knee repair  (M17.12): per therapy and ortho teams  2. PONV (R11.2, Z98.890): anti-emetics  3. Asthma (J45.909): continue home med  4. Acid reflux (K21.9): continue PPI  5. Cough (R05): continue tessalon  6. DVT prophy: Lovenox 40 mg SQ, NAHUN and SCD per ortho    Once ambulating regularly, okay to discharge home.

## 2018-12-08 NOTE — NURSING
"Pt arrived to floor via bed with , Margot PLAZA. Moaning, thrashing  and crying reporting pain "20/10"  Repositioned with no relief. Per reprt patient previously medicated in PACU and was asleep before transport. Oriented to room, call light placed within reach, bed low and locked, and family at bedside. Urrutia noted draining clear yellow urine to gravity. Dressing noted to LLE CDI.  Will continue to monitor.   "

## 2018-12-08 NOTE — PLAN OF CARE
Problem: Patient Care Overview  Goal: Plan of Care Review  Outcome: Ongoing (interventions implemented as appropriate)  Patient remains free from injury or falls. Vital signs stable throughout night on room air. Positions self independently. Urrutia draining to gravity. Pain managed with IV and PO medications. Complains of nausea and vomiting emesis. Periods of anxiousness and crying. Hemovac drain. Spouse at bedside. Bed in low locked position and call light within reach. Will continue to monitor. Purposeful hourly rounding performed.

## 2018-12-08 NOTE — PROGRESS NOTES
Assessment/Plan:  Status Post left Total Knee Arthroplasty. Complications: none   Continues current post-op course  D/c kaye, drain d/c'd  Progress with PT today. Plan Home Health for discharge.     LOS: 1 day     Subjective:  Post-Operative Day: 1 Status Post left Total Knee Arthroplasty  Systemic or Specific Complaints:Pain Control. Requesting demerol as other medications cause nausea. Currently improved.      Objective:  Vital signs in last 24 hours:  Temp:  [97.4 °F (36.3 °C)-99.1 °F (37.3 °C)] 98.3 °F (36.8 °C)  Pulse:  [] 81  Resp:  [14-20] 17  SpO2:  [93 %-99 %] 97 %  BP: (106-171)/(56-80) 124/56    General: alert, appears stated age and cooperative   Wound: Wound clean and dry no evidence of infection. and Wound Intact   Motion: Extension: Full Extension   DVT Exam: No evidence of DVT seen on physical exam.     Data Review  CBC:   Lab Results   Component Value Date    WBC 9.21 12/08/2018    RBC 3.99 (L) 12/08/2018    HGB 11.7 (L) 12/08/2018    HGB 12.4 07/29/2013    HCT 36.5 (L) 12/08/2018     12/08/2018

## 2018-12-08 NOTE — PT/OT/SLP PROGRESS
Physical Therapy      Patient Name:  Valdez Salazar   MRN:  [id number]    Patient not seen today secondary to Patient unwilling to participate. Will follow-up later today.    Rudy Gutierrez, PT

## 2018-12-09 VITALS
BODY MASS INDEX: 33.32 KG/M2 | OXYGEN SATURATION: 96 % | DIASTOLIC BLOOD PRESSURE: 62 MMHG | RESPIRATION RATE: 18 BRPM | WEIGHT: 200 LBS | HEART RATE: 79 BPM | HEIGHT: 65 IN | TEMPERATURE: 98 F | SYSTOLIC BLOOD PRESSURE: 112 MMHG

## 2018-12-09 LAB
ANION GAP SERPL CALC-SCNC: 7 MMOL/L
BASOPHILS # BLD AUTO: 0.03 K/UL
BASOPHILS NFR BLD: 0.3 %
BUN SERPL-MCNC: 6 MG/DL
CALCIUM SERPL-MCNC: 8.9 MG/DL
CHLORIDE SERPL-SCNC: 104 MMOL/L
CO2 SERPL-SCNC: 27 MMOL/L
CREAT SERPL-MCNC: 0.7 MG/DL
DIFFERENTIAL METHOD: ABNORMAL
EOSINOPHIL # BLD AUTO: 0.2 K/UL
EOSINOPHIL NFR BLD: 2.1 %
ERYTHROCYTE [DISTWIDTH] IN BLOOD BY AUTOMATED COUNT: 13.5 %
EST. GFR  (AFRICAN AMERICAN): >60 ML/MIN/1.73 M^2
EST. GFR  (NON AFRICAN AMERICAN): >60 ML/MIN/1.73 M^2
GLUCOSE SERPL-MCNC: 114 MG/DL
HCT VFR BLD AUTO: 35 %
HGB BLD-MCNC: 11.1 G/DL
LYMPHOCYTES # BLD AUTO: 1.2 K/UL
LYMPHOCYTES NFR BLD: 11.3 %
MCH RBC QN AUTO: 29.4 PG
MCHC RBC AUTO-ENTMCNC: 31.7 G/DL
MCV RBC AUTO: 93 FL
MONOCYTES # BLD AUTO: 1 K/UL
MONOCYTES NFR BLD: 9.9 %
NEUTROPHILS # BLD AUTO: 7.8 K/UL
NEUTROPHILS NFR BLD: 76.3 %
PLATELET # BLD AUTO: 186 K/UL
PMV BLD AUTO: 10.6 FL
POTASSIUM SERPL-SCNC: 4.2 MMOL/L
RBC # BLD AUTO: 3.78 M/UL
SODIUM SERPL-SCNC: 138 MMOL/L
WBC # BLD AUTO: 10.28 K/UL

## 2018-12-09 PROCEDURE — 25000003 PHARM REV CODE 250: Performed by: ORTHOPAEDIC SURGERY

## 2018-12-09 PROCEDURE — 97535 SELF CARE MNGMENT TRAINING: CPT

## 2018-12-09 PROCEDURE — 85025 COMPLETE CBC W/AUTO DIFF WBC: CPT

## 2018-12-09 PROCEDURE — 36415 COLL VENOUS BLD VENIPUNCTURE: CPT

## 2018-12-09 PROCEDURE — 97116 GAIT TRAINING THERAPY: CPT

## 2018-12-09 PROCEDURE — G8989 SELF CARE D/C STATUS: HCPCS | Mod: CK

## 2018-12-09 PROCEDURE — G8988 SELF CARE GOAL STATUS: HCPCS | Mod: CJ

## 2018-12-09 PROCEDURE — 97530 THERAPEUTIC ACTIVITIES: CPT

## 2018-12-09 PROCEDURE — 80048 BASIC METABOLIC PNL TOTAL CA: CPT

## 2018-12-09 PROCEDURE — 97110 THERAPEUTIC EXERCISES: CPT

## 2018-12-09 RX ORDER — MEPERIDINE HYDROCHLORIDE 50 MG/1
50 TABLET ORAL EVERY 6 HOURS PRN
Qty: 30 TABLET | Refills: 0 | Status: SHIPPED | OUTPATIENT
Start: 2018-12-09 | End: 2019-02-26

## 2018-12-09 RX ORDER — PROMETHAZINE HYDROCHLORIDE 25 MG/1
25 TABLET ORAL EVERY 6 HOURS PRN
Qty: 30 TABLET | Refills: 0 | Status: SHIPPED | OUTPATIENT
Start: 2018-12-09 | End: 2019-02-26

## 2018-12-09 RX ADMIN — FAMOTIDINE 20 MG: 20 TABLET ORAL at 09:12

## 2018-12-09 RX ADMIN — PROMETHAZINE HYDROCHLORIDE 25 MG: 25 TABLET ORAL at 09:12

## 2018-12-09 RX ADMIN — MUPIROCIN 1 G: 20 OINTMENT TOPICAL at 09:12

## 2018-12-09 RX ADMIN — MEPERIDINE HYDROCHLORIDE 50 MG: 50 TABLET ORAL at 03:12

## 2018-12-09 RX ADMIN — PANTOPRAZOLE SODIUM 40 MG: 40 TABLET, DELAYED RELEASE ORAL at 09:12

## 2018-12-09 RX ADMIN — MEPERIDINE HYDROCHLORIDE 50 MG: 50 TABLET ORAL at 09:12

## 2018-12-09 RX ADMIN — CELECOXIB 200 MG: 200 CAPSULE ORAL at 09:12

## 2018-12-09 NOTE — PLAN OF CARE
Problem: Occupational Therapy Goal  Goal: Occupational Therapy Goal  Goals to be met by 1/8/19  1. SBA G/H standing at sink MET 12/9/18  2. Independent UBD  MET 12/9/18  3. Mod LBD  MET 12/9/18  4. Min A self-management of anxiety pain attacks during treatment IMPROVED-NOT MET  5. Discuss home activity recommendations    Outcome: Ongoing (interventions implemented as appropriate)  The pt has improved activity tolerance but still limited LLE exercise due to expectation of pain.  She was Min A supine>sit EOB, CGA sit><stand, SBA chair and toilet transfer, SBA ambulation with RW with self-care (Supervision toilet hygiene, SBA G/H (wash hands at sink), MI UBD, Mod A LBD0 and home activity recommendations discussed. SHREYA Marvin 12/9/2018

## 2018-12-09 NOTE — PROGRESS NOTES
Postop day 2.  Left knee replacement.  Pain better controlled.  Anxiety better.  Okay to discharge.  Xarelto for DVT prophylaxis.  Instructions given. Home Health arranged.  Follow-up will be in 2 weeks

## 2018-12-09 NOTE — DISCHARGE INSTRUCTIONS
Knee Athroscopy Discharge Instructions    1) Pain: After surgery your knee will be sore. The knee will likely have been injected with a numbing medicine (Exparel) prior to completion of surgery for pain control. This is indicated on a green bracelet that you will continue to wear for 4 days after surgery. You will   also get a prescription for pain control before you leave the hospital. Ice and elevation will assist with pain control.    a) Apply ice as much as possible for the first 72 hours. After 72 hours, apply ice for 20minutes 3-4 times a day, after therapy,after exercising or whenever experiencing pain. Avoid direct skin contact with ice to prevent frostbit.          b) Elevate the affected leg with the pillow the length of the leg  to assist with swelling and pain.  2) Incision Care:  a) Some drainage from the incision in the first 72 hours is normal. If drainage is excessive,remove bandage,  pat dry, cover with sterile gauze and secure with tape. Notify physician about excessive drainage. Staples will be removed 14 days after surgery   3) Activity:  a) Perform exercises 2-3 x day.  b) You may shower 48 hours after surgery providing the dressing is waterproof. No tub or hot tub usage.. Support help is mandatory during showering. If the dressing becomes wet, replace with a new dressing.   c) Wear thigh high julianna hose stockings for 3 weeks after surgery .You may remove stockings for 1- 2 hours during the day only. Send patient home with an extra pair julianna hose.If your physician orders the CPM machine you are to use it for 2 hours in the am and 2 hours in the pm.Increase the flexion 5 degrees each session if tolerated. This is not to replace your exercise program.  4) For lifetime after your replacement surgery, you may need antibiotic coverage before dental or minor surgical procedures.  5) Possible Complications: Call Surgeon  a) Infection: Report these signs and symptoms to your surgeon.  i) Unexpected redness  around incision   ii) Persistent drainage from wound after 72 hours.  iii) Temperature ,can be treated with Tylenol. Do not go to the emergency room or urgent care center, call your surgeon.   iv) Additional swelling  v) Pain not controlled with current pain medication  b) Blood Clot: Report theses signs and symptoms to your surgeon  i) Unusual pain  ii) Red or discolored skin  iii) Swelling in the leg  iv) Unusual warm skin

## 2018-12-09 NOTE — PROGRESS NOTES
Progress Note  Medicine    Admit Date: 12/7/2018   LOS: 2 days     SUBJECTIVE:     Follow-up For:      Interval History:    Patient feels better today.  No nausea vomiting overnight.      Review of Systems:  Constitutional: No fever or chills  Respiratory: No cough or shortness of breath  Cardiovascular: No chest pain or palpitations  Gastrointestinal: No nausea or vomiting  Neurological: No confusion or weakness    OBJECTIVE:     Vital Signs Range (Last 24H):  Vitals:    12/09/18 0444   BP: (!) 101/49   Pulse: 79   Resp: 18   Temp: 97.9 °F (36.6 °C)       Temp:  [97.9 °F (36.6 °C)-98.9 °F (37.2 °C)]   Pulse:  [79-95]   Resp:  [16-18]   BP: (101-121)/(49-98)   SpO2:  [93 %-99 %]     I & O (Last 24H):  No intake or output data in the 24 hours ending 12/09/18 1018    Physical Exam:  General appearance: Well developed, well nourished  Eyes:  Conjunctivae/corneas clear. PERRL.  Lungs: Normal respiratory effort,   clear to auscultation bilaterally   Heart: Regular rate and rhythm, S1, S2 normal, no murmur, rub or joann.  Abdomen: Soft, non-tender non-distended; bowel sounds normal; no masses,  no organomegaly  Extremities: No cyanosis or clubbing. No edema.  DP pulses 2+/4+ bilaterally. Ice sleeve on left knee.  Skin: Skin color, texture, turgor normal. No rashes or lesions      Laboratory Data:    Recent Labs   Lab 12/09/18  0432      K 4.2      CO2 27   BUN 6   CREATININE 0.7   CALCIUM 8.9     Lab Results   Component Value Date    CALCIUM 8.9 12/09/2018     No results found for: IRON, TIBC, FERRITIN, SATURATEDIRO    Medications:  Medication list was reviewed and changes noted under Assessment/Plan.    Diagnostic Results:  Reviewed most recent CT/US/Echo/MRI    ASSESSMENT/PLAN:       1. Left knee repair (M17.12): per therapy and ortho teams  2. PONV (R11.2, Z98.890): anti-emetics p.r.n.  3. Asthma (J45.909): continue home med  4. Acid reflux (K21.9): continue PPI  5. Cough (R05): continue tessalon  6. DVT  prophy: Lovenox 40 mg SQ, NAHUN and SCD per ortho    okay to discharge home.

## 2018-12-09 NOTE — NURSING
Eager & in agreement w/ DC. VU of DC instructions--paperwork & prescriptions passed & explained.  IV removed w/ cath tip intact, WNL. Verified HH & DME set up via [facility] & walker has arrived to pt room. Equipment and NAHUN hose in patient possession. To be DCd home w/ -- will be escorted downstairs via  transport team once dressed and readyFree from falls, injury, or skin breakdown this hospital admission.

## 2018-12-09 NOTE — PT/OT/SLP DISCHARGE
"Physical Therapy Discharge Summary    Name: Valdez Salazar  MRN: [id number]   Principal Problem: Osteoarthritis of left knee     Patient Discharged from acute Physical Therapy on 12/9/18.  Please refer to prior PT noted date on 12/9/18 for functional status.     Assessment:     Patient has not met goals.    Objective:     GOALS:   Multidisciplinary Problems     Physical Therapy Goals     Not on file          Multidisciplinary Problems (Resolved)        Problem: Physical Therapy Goal    Goal Priority Disciplines Outcome Goal Variances Interventions   Physical Therapy Goal   (Resolved)     PT, PT/OT Outcome(s) achieved     Description:  Goals to be met by 12-14-18.  1. Sup<>sit mod I  2. Sit<>stand with RW mod I  3. amb 150' with RW mod I  4. Up/down 6" curb step with RW mod I                    Reasons for Discontinuation of Therapy Services  Transfer to alternate level of care.      Plan:     Patient Discharged to: Home with Home Health Service.    Emmie Calvillo, PT  12/9/2018  "

## 2018-12-09 NOTE — PLAN OF CARE
Problem: Patient Care Overview  Goal: Plan of Care Review  Outcome: Ongoing (interventions implemented as appropriate)  Physician discharging patient. Patients medication returned to her per patients nurseAna.

## 2018-12-09 NOTE — PLAN OF CARE
"Problem: Physical Therapy Goal  Goal: Physical Therapy Goal  Goals to be met by 12-14-18.  1. Sup<>sit mod I  2. Sit<>stand with RW mod I  3. amb 150' with RW mod I  4. Up/down 6" curb step with RW mod I   Outcome: Ongoing (interventions implemented as appropriate)  Pt progressing slowly with PT. She continues to have significant pain reactions to movement and intermittently crying during session. She was able tolerate short distance gait, toileting, 4" step (into shower), and limited assisted exercise program. Despite pain reactions, she demonstrates safety with mobility; continue PT in home environment.       "

## 2018-12-09 NOTE — PT/OT/SLP PROGRESS
Occupational Therapy   Treatment    Name: Valdez Salazar  MRN: [id number]  Admitting Diagnosis:  Osteoarthritis of left knee  2 Days Post-Op, s/p Left TKA    Recommendations:     Discharge Recommendations: (Home Health PT and OT)  Discharge Equipment Recommendations:  none  Barriers to discharge:  Other (Comment)    Subjective     Pain/Comfort:  · Pain Rating 1: 0/10(supine in bed)  · Location - Side 1: Left  · Location - Orientation 1: generalized  · Location 1: knee(thigh)  · Pain Addressed 1: Pre-medicate for activity, Distraction, Cessation of Activity, Reposition, Nurse notified(Her nurseAna aware of her pain medication needs)  · Pain Rating Post-Intervention 1: 5/10(end of session after exercise and OOB ADLs)    Although performance improved, she continues to have very low activity tolerance due to high anticipatory anxiety and low pain tolerance, needs encouragement and assist initiating action to participate in functional mobility and ambulatory self-care tasks.  Objective:     Communicated with: patient and her nurse prior to session.  Patient found with all lines intact, call button in reach and patient's  present and cryotherapy, SCD, FCD, peripheral IV upon OT entry to room.    General Precautions: Standard, fall   Orthopedic Precautions:LLE weight bearing as tolerated   Braces: N/A     Occupational Performance:    Bed Mobility:    · Min A supine>sit EOB ( assisting)     Functional Mobility/Transfers:-frequent instruction for RW placement during mobility tasks and safety with transfers needed  · CGA sit><stand with RW  · SBA toilet transfer with RW  · SBA bed>chair transfer with RW    Ambulated bed>bathroom>sofa to retrieve clothing>EOB for dressing SBA-instruction for RW placement, encouragement needed to increase weight taken on LLE during stepping    Activities of Daily Living:  · SBA G/H (wash hands at sink, declined other self-care tasks)  · MI UBD (don dress) seated and standing  with RW at EOB  · Mod A LBD (don briefs, doff/don socks, don shoes) seated EOB with RW used for standing  · Total A to retrieve clothing for use with RW=pt reluctant to try tasks, expects others to help  · Supervision toilet hygiene-seated on toilet    AM PAC 6 Click ADL: 18    Treatment & Education:  Safety with standing ADLs and transfers, LBD, safety with standing ADLs, home safety and set-up for self-care, grading caregiver assist to increase pt participation in mobility and self-care tasks, activity restrictions and recommendations, integrating LE exercise into ADLs (Max A needed to get pt to exercise LLE during transfers and mobility tasks today)    Patient left up in chair with all lines intact, call button in reach and nurse puting NAHUN hose on LLE and patient's  present  Education:    Assessment:     Valdez Salazar is a 58 y.o. female with a medical diagnosis of Osteoarthritis of left knee, s/p Left TKA.  She presents with the following performance deficits affecting function are impaired endurance, weakness, impaired functional mobilty, impaired self care skills, impaired balance, gait instability, decreased lower extremity function, decreased safety awareness, edema, decreased ROM, orthopedic precautions(anxiety).  The pt has improved activity tolerance but still limited LLE exercise due to expectation of pain.  She was Min A supine>sit EOB, CGA sit><stand, SBA chair and toilet transfer, SBA ambulation with RW with self-care (Supervision toilet hygiene, SBA G/H (wash hands at sink), MI UBD, Mod A LBD and home activity recommendations discussed.  Continuation of OT treatment is needed to maximize patient performance during her hospitalization.    Rehab Prognosis:  Good; patient would benefit from acute skilled OT services to address these deficits and reach maximum level of function.       Plan:     Patient to be seen daily to address the above listed problems via self-care/home management,  therapeutic activities, therapeutic exercises  · Plan of Care Expires: 01/08/19  · Plan of Care Reviewed with: patient, spouse    This Plan of care has been discussed with the patient who was involved in its development and understands and is in agreement with the identified goals and treatment plan    GOALS:   Multidisciplinary Problems     Occupational Therapy Goals        Problem: Occupational Therapy Goal    Goal Priority Disciplines Outcome Interventions   Occupational Therapy Goal     OT, PT/OT Ongoing (interventions implemented as appropriate)    Description:  Goals to be met by 1/8/19  1. SBA G/H standing at sink MET 12/9/18  2. Independent UBD  MET 12/9/18  3. Mod LBD  MET 12/9/18  4. Min A self-management of anxiety pain attacks during treatment IMPROVED-NOT MET  5. Discuss home activity recommendations                      Time Tracking:     OT Date of Treatment: 12/09/18  OT Start Time: 0754  OT Stop Time: 0840  OT Total Time (min): 46 min    Billable Minutes:Self Care/Home Management 46    SHREYA Marvin  12/9/2018

## 2018-12-09 NOTE — PT/OT/SLP PROGRESS
"Physical Therapy Treatment    Patient Name:  Valdez Salazar   MRN:  [id number]    Recommendations:     Discharge Recommendations:  other (see comments)(home health PT/OT)   Discharge Equipment Recommendations: none   Barriers to discharge: None    Assessment:     Valdez Salazar is a 58 y.o. female admitted with a medical diagnosis of Osteoarthritis of left knee.  She presents with the following impairments/functional limitations:  pain, decreased ROM, decreased lower extremity function, weakness.    Pt progressing slowly with PT. She needs extended time to perform all activities. She continues to have significant pain reactions to movement and intermittently crying during session. She was able tolerate short distance gait, toileting, 4" step (into shower), and limited assisted exercise program. Despite pain reactions, she demonstrates safety with mobility; continue PT in home environment.     Rehab Prognosis:  Good; patient would benefit from acute skilled PT services to address these deficits and reach maximum level of function.      Recent Surgery: Procedure(s) (LRB):  ARTHROPLASTY, KNEE, TOTAL (Left) 2 Days Post-Op    Plan:     During this hospitalization, patient to be seen BID to address the above listed problems via gait training, therapeutic activities, therapeutic exercises  · Plan of Care Expires:  01/07/19   Plan of Care Reviewed with: patient    Subjective     Communicated with bedside nurse prior to session.  Patient found reclined in chair with spouse present upon PT entry to room, agreeable to treatment.      Chief Complaint: pain  Patient comments/goals: "I thought I could go to the bathroom and that could count as my walk with you"  Pain/Comfort:  · Pain Rating 1: 5/10  · Location - Side 1: Left  · Location 1: knee  · Pain Addressed 1: Pre-medicate for activity, Cessation of Activity, Distraction, Reposition  · Pain Rating Post-Intervention 1: 6/10    Patients cultural, spiritual, Hoahaoism " "conflicts given the current situation: none mentioned    Objective:       General Precautions: Standard, (fall risk)   Orthopedic Precautions:LLE weight bearing as tolerated   Braces: N/A     Functional Mobility:  Bed Mobility:     Supine to Sit: did not occur  Sit to Supine: did not occur  Transfers:     Sit to Stand:  supervision with rolling walker x 1 at toilet with grab bar, x 1 at bedside chair. Verbal cues for technique.   Gait: 2 x 24 ft on level tile with rolling walker and supervision. Verbal cues for correction of gait deviations (asymmetrical step length, L forefoot initial contact, decreased time in L stance).  · Stairs: Simulated curb step (pt deferred formal curb step in gym, electing to practice 4" step into shower instead). CGA for 4" step into/out of step in shower with rolling walker. Verbal cues for sequencing.        AM-PAC 6 CLICK MOBILITY  Turning over in bed (including adjusting bedclothes, sheets and blankets)?: 3  Sitting down on and standing up from a chair with arms (e.g., wheelchair, bedside commode, etc.): 3  Moving from lying on back to sitting on the side of the bed?: 3  Moving to and from a bed to a chair (including a wheelchair)?: 3  Need to walk in hospital room?: 3  Climbing 3-5 steps with a railing?: 3  Basic Mobility Total Score: 18       Therapeutic Activities and Exercises:   -Supervision for toileting, sitting unique-care, hand hygiene.   -At sink supervision for brushing teeth, washing face, pt self-administered inhaler.   -Pt performed sitting therapeutic exercises prior to standing including ankle pumps and small arm circles (speed bag) x 1 minute each, then after gait including L assisted hip flexion, L assisted long arc quads, bilateral ankle pumps, L assisted knee flexion x 10 reps with verbal and visual cues for performance and breath control.   -Extended time to perform all mobility.   -Demo given by PT for all exercises, stairs, and gait.   -PT educated patient and " "spouse re: PT plan of care, role of PT, safety with OOB mobility, use of walker, transfer technique, breath control, pain<>tension cycle, activity frequency.         Patient left reclined in chair with all lines intact, call button in reach, bedside nurse notified and spouse present..    GOALS:   Multidisciplinary Problems     Physical Therapy Goals        Problem: Physical Therapy Goal    Goal Priority Disciplines Outcome Goal Variances Interventions   Physical Therapy Goal     PT, PT/OT Ongoing (interventions implemented as appropriate)     Description:  Goals to be met by 12-14-18.  1. Sup<>sit mod I  2. Sit<>stand with RW mod I  3. amb 150' with RW mod I  4. Up/down 6" curb step with RW mod I                    Time Tracking:     PT Received On: 12/09/18  PT Start Time: 0949     PT Stop Time: 1059  PT Total Time (min): 70 min     Billable Minutes: Gait Training 25, Therapeutic Activity 25 and Therapeutic Exercise 20    Treatment Type: Treatment  PT/PTA: PT     PTA Visit Number: 0     Emmie Calvillo PT  12/09/2018  "

## 2018-12-09 NOTE — PLAN OF CARE
Problem: Patient Care Overview  Goal: Plan of Care Review  Outcome: Ongoing (interventions implemented as appropriate)  Prefers inhaler at 10A-10P. Performs thorough oral hygiene after inhaler.

## 2018-12-10 ENCOUNTER — TELEPHONE (OUTPATIENT)
Dept: MEDSURG UNIT | Facility: OTHER | Age: 58
End: 2018-12-10

## 2018-12-10 NOTE — PT/OT/SLP DISCHARGE
Occupational Therapy Discharge Summary    Valdez Salazar  MRN: [id number]   Principal Problem: Osteoarthritis of left knee      Patient Discharged from acute Occupational Therapy on 12/92018.  Please refer to prior OT note dated 12/9/2018 for functional status.    Assessment:      Goals partially met. Patient appropriate for care in another setting.    Objective:     GOALS:   Multidisciplinary Problems     Occupational Therapy Goals     Not on file          Multidisciplinary Problems (Resolved)        Problem: Occupational Therapy Goal    Goal Priority Disciplines Outcome Interventions   Occupational Therapy Goal   (Resolved)     OT, PT/OT Outcome(s) achieved    Description:  Goals to be met by 1/8/19  1. SBA G/H standing at sink MET 12/9/18  2. Independent UBD  MET 12/9/18  3. Mod LBD  MET 12/9/18  4. Min A self-management of anxiety pain attacks during treatment IMPROVED-NOT MET  5. Discuss home activity recommendations                      Reasons for Discontinuation of Therapy Services  Transfer to alternate level of care.      Plan:     Patient Discharged to: Home with Home Health Service    Renae Bowers, OT  12/10/2018

## 2018-12-19 NOTE — DISCHARGE SUMMARY
DATE OF ADMISSION:  12/07/2018.    DATE OF DISCHARGE:  12/09/2018.    ADMISSION DIAGNOSIS:  Left knee end-stage osteoarthritis.    DISCHARGE DIAGNOSIS:  Left knee end-stage osteoarthritis.    PROCEDURE PERFORMED:  Left total knee arthroplasty using Biomet Vanguard   cemented posterior stabilized femoral component, size 65 tibial tray, 71   patella, size 32 all polyethylene cemented and 10 mm polyethylene tray.    HISTORY OF PRESENT ILLNESS:  Ms. Salazar is a 58-year-old woman who has had   progressive and significant left hand pain affecting her daily activities which   have been unrelieved with conservative measures.  Radiographs have demonstrated   end-stage degenerative changes of the knee and after the potential benefits as   well as risks and complications of options were reviewed, the patient has   elected to undergo the above procedure.    HOSPITAL COURSE:  The patient was admitted to the hospital where she underwent   the above procedure without complication.  She was transported to the floor   postoperatively from the recovery area in stable condition.  On postoperative   day #1, she had some nausea with narcotic pain medication, difficulty tolerating   hydrocodone and was more comfortable on Demerol.  She had some nausea, but was   able to tolerate a regular diet.  On postoperative day #1, the Hemovac drain was   removed.  The incision remained clean with Aquacel dressing in place.  Mild   swelling about the knee.  The calf was soft with negative Homans and neurologic   and vascular examination was normal.  She progressed slowly with therapy on   postoperative day #1.  On postoperative day #2, she was much more comfortable on   oral pain medication without any other medication for breakthrough pain.  She   was able to mobilize much better with physical therapy and was able to manage   with ambulation and stairs.  Neurological and vascular examination remained   intact with no evidence of DVT.  She is being  discharged to home in good   condition with home health physical therapy for gait training, range of motion   exercises and wound care.  The staples will be removed on postoperative day #14.    She will follow up in my office in 3 weeks or call if there are any problems   or concerns prior to that time.      FRANCISCO/ADAM  dd: 12/18/2018 15:43:15 (CST)  td: 12/18/2018 21:14:57 (CST)  Doc ID   #[id number]  Job ID #[id number]    CC:

## 2018-12-19 NOTE — OP NOTE
DATE OF PROCEDURE:  12/07/2018    SURGEON:  Claude Williams, IV, M.D.    ASSISTANT:  Personal scrub tech.    PREOPERATIVE DIAGNOSIS:  Left knee end-stage osteoarthritis.    POSTOPERATIVE DIAGNOSIS:  Left knee end-stage osteoarthritis.    OPERATIVE PROCEDURE PERFORMED:  Left total knee arthroplasty.    INDICATIONS:  Ms. Salazar is a 58-year-old woman who has had left knee pain,   stiffness and significant limitations of her activities.  She has had symptoms   of progressive over several years, which have more recently been unrelieved with   conservative measures.  Radiographs demonstrate end-stage degenerative   cartilage space narrowing of the knee.  After the potential benefits as well as   risks and complications of operative intervention were reviewed, the patient has   elected to undergo the above procedure.    PROCEDURE IN DETAIL:  After proper informed consent was obtained, the patient   was transported to the Operating Suite and placed supine on the operating table   and all bony prominences were well padded.  The patient underwent general   endotracheal anesthesia without difficulty per the anesthesiologist.  Left lower   extremity was placed into a well-padded thigh tourniquet then prepped with   alcohol, ChloraPrep and draped in the usual sterile fashion.  Preoperative   antibiotics were administered and routine preoperative timeout was taken and the   operative site was positively identified by the operative team.  After Esmarch   exsanguination, the left lower extremity tourniquet was elevated to 300 mmHg.  A   longitudinal incision was then made over the anterior aspect of the left knee   and careful dissection was carried down through the subdermal tissue using Bovie   cautery for hemostasis.  A median parapatellar arthrotomy was then performed   and the patella was retracted laterally.  The fatpad anterior and posterior   cruciate ligaments were excised as well as the medial meniscus remnant and    lateral meniscus.  The femoral canal was then reamed at the notch and an   intramedullary femoral guide was placed.  The distal femoral cut was made and   attention was then turned to the tibia where an extramedullary guide was used to   align the tibial cutting jig.  Minimal amount of bone was taken from the medial   more affected compartment and a tibial cut was made.  The tensor was then   brought in to align the distal femoral cutting jig, the tension with the knee   was 90 degrees.  The patient was then placed and confirmed to be inappropriate   balance.  Anterior and posterior cuts were made and the size 10 block was used   to evaluate the knee in 90-degree flexion and full extension and found to have   appropriate 10 mm gap with excellent stability balance.  Femoral chamfer cuts   were made and the tibia was then prepared.  This was sized as well and prepared.    The patella was then incised and the patella freehand cut was then made.    Trial components were placed, size 65 femoral component tibial tray, size 71 mm   patella, size 32.  This balanced nicely with appropriate patellar tracking.  The   trials were removed and the knee was thoroughly lavaged, the components were   then cemented, again a size 65 femoral component, tibial tray size 71 and   patella size 32.  The 10 mm polyethylene trial component confirmed appropriate   balance with full extension and excellent flexion and patellar tracking.  The   tray trial was removed and the tourniquet was deflated.  Hemostasis was   confirmed.  The final tibial tray was placed and the locking pin was secured.    The wound was then closed in layers with #2 Ethibond interrupted suture, 0   Vicryl, 2-0 Vicryl and sterile skin staples over a medium Hemovac drain.  A   sterile soft dressing was then applied.  The patient was awakened in the   Operating Suite and taken to Recovery area in stable condition.  She tolerated   the procedure very well.  There was 2+  dorsalis pedis pulse after application of   the dressing.  Lap, instrument and needle counts were correct at the end of the   procedure x2.    BLOOD LOSS:  Approximately 100 mL.    COMPLICATIONS:  None.      FRANCISCO/ADAM  dd: 12/18/2018 15:39:35 (CST)  td: 12/18/2018 23:18:25 (CST)  Doc ID   #[id number]  Job ID #[id number]    CC:

## 2019-01-22 ENCOUNTER — OFFICE VISIT (OUTPATIENT)
Dept: ALLERGY | Facility: CLINIC | Age: 59
End: 2019-01-22
Payer: COMMERCIAL

## 2019-01-22 VITALS
BODY MASS INDEX: 31.32 KG/M2 | SYSTOLIC BLOOD PRESSURE: 122 MMHG | WEIGHT: 188 LBS | HEIGHT: 65 IN | DIASTOLIC BLOOD PRESSURE: 72 MMHG

## 2019-01-22 DIAGNOSIS — R06.89 DYSPNEA AND RESPIRATORY ABNORMALITIES: ICD-10-CM

## 2019-01-22 DIAGNOSIS — J31.0 CHRONIC RHINITIS: ICD-10-CM

## 2019-01-22 DIAGNOSIS — R13.19 ESOPHAGEAL DYSPHAGIA: ICD-10-CM

## 2019-01-22 DIAGNOSIS — R49.0 HOARSENESS: Primary | ICD-10-CM

## 2019-01-22 DIAGNOSIS — K21.9 LARYNGOPHARYNGEAL REFLUX (LPR): ICD-10-CM

## 2019-01-22 DIAGNOSIS — R06.00 DYSPNEA AND RESPIRATORY ABNORMALITIES: ICD-10-CM

## 2019-01-22 DIAGNOSIS — E55.9 VITAMIN D DEFICIENCY: ICD-10-CM

## 2019-01-22 DIAGNOSIS — R05.3 CHRONIC COUGH: ICD-10-CM

## 2019-01-22 PROCEDURE — 99215 PR OFFICE/OUTPT VISIT, EST, LEVL V, 40-54 MIN: ICD-10-PCS | Mod: ,,, | Performed by: ALLERGY & IMMUNOLOGY

## 2019-01-22 PROCEDURE — 3008F BODY MASS INDEX DOCD: CPT | Mod: ,,, | Performed by: ALLERGY & IMMUNOLOGY

## 2019-01-22 PROCEDURE — 3008F PR BODY MASS INDEX (BMI) DOCUMENTED: ICD-10-PCS | Mod: ,,, | Performed by: ALLERGY & IMMUNOLOGY

## 2019-01-22 PROCEDURE — 99215 OFFICE O/P EST HI 40 MIN: CPT | Mod: ,,, | Performed by: ALLERGY & IMMUNOLOGY

## 2019-01-22 RX ORDER — SUCRALFATE 1 G/1
1 TABLET ORAL
Qty: 120 TABLET | Refills: 3 | Status: SHIPPED | OUTPATIENT
Start: 2019-01-22 | End: 2019-08-29 | Stop reason: SDUPTHER

## 2019-01-22 NOTE — PROGRESS NOTES
"Subjective:       Patient ID: Valdez Salazar is a 58 y.o. female.    Chief Complaint: Other (having trouble swallowing - has had several doctors change meds and wants to discuss this with Dr. Kelly)       Pt presents for chronic cough, rhinitis, dysphagia.     Other doctors in tx: Dr. Hart; Dr. Marroquin;  Dr. Corbin.     Recently, she had her knee replaced 6 weeks ago.     Currently the change in weather has "taken her breath away"  Her voice is hoarse and 2 weeks ago, she states her voice could not be heard.   ENT - Dr. Peterson states she is getting a nodule on her vocal cord. Was advised to increase water intake and to decrease force of cough due to nodule.   Pt states she stopped nasal sprays post ENT visit.   Still using saline.   Pt states being off the sprays, there really wasn't a change in her symptoms vs being on it.     Pt states she needs to clear the mucus to breathe and cough.   States everything she eats, can't breathe. Takes ranitidine - may take after eating, and benadryl-D that may help temporarily.   Pt states once she takes these meds she feels she can breathe.   She stopped the protonix 40 mg. Feels wasn't helping and took in the evening.     Dr. Marroquin- esophagus stretching may need to occur.   Getting food stuck few days per week with breads. 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   Pt notes onions, bellpeppers, heavily seasoned foods can exacerbate mucus.       Prior history   In the past she has tried omeprazole 40 mg BID, but Dr. marroquin took her off this regimen.   He placed her on protonix 40 mg once per day.   Dr. Hart performed scope and dx with LPR and sent her to GI, Dr. Marroquin. No vocal cord lesions at that time.     Cough:  Condition: exacerbated  Sx: coughing spells, thick mucus- main issue is the thick mucus    Has symptoms despite the spirva and symbicort. Stopped spiriva 2 weeks ago.   Increased use of rescue inhaler.   Intensity: They can be bad enough to where she may go " "to the ED- however she is 90 % improved.   Tx: nasacort 2 sen qhs, started symbicort 80 1 puff bid- now improved but increased to 160 2 puffs twice per day by Dr. Corbin, azelastine- qid prn, spiriva 2 puff qday rx given 1 puff twice per day, mucinex D. Magic mouthwash qhs.  We had a trial off montelukast. She is on a medrol pack and finishing for orthopedics.    -Rescue inhaler may or may not help, not a consistent efficacy. mucinex-D helps with thickness of mucus. Discussed D can increase her BP.   -She has been on an antibiotic but isn't unsure of the name or type. - early June. It helped the "color " of the mucus but hasn't had improvement in sx.   Trigger: when she eats, more mucus comes and she coughs. Doesn't matter if it's water or food.   Protonix 40 mg given for BID x 4 weeks then once daily. Thinks that it helped with the mucus. BID was a better formulation than qday.     Duration: She has been having these symptoms for the past year.    Voice is still improved off breo.   Of note, Since she's been out of [facility], her symptoms have been bad.   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     Imaging:  Ct chest reviewed and no significant findings.   Sinus ct in 2015 reviewed and no significant findings.    Discussed possibly repeating as it has been 3 years since last scan.     Atopic history  Asthma: no history - but had a methacholine challenge- per patient report positive.   - stopped breo due to dysphonia. and montelukast was d/c. Now seeing Dr. Corbin.   Rhinitis: see above  Dysphagia: at times food gets stuck- see  Cara for reflux- needs f/u  Food allergy: none   Oral allergy: none       Review of Systems      General: neg unexpected weight changes, fevers, chills, night sweats, malaise  HEENT: see hpi, Neg eye pain, vision changes, ear drainage, nose bleeds, throat tightness, sores in the mouth  CV: Neg chest pain, palpitations, swelling  Resp: see hpi, neg shortness of " "breath, hemoptysis  GI: see hpi, neg night abdominal pain, chronic diarrhea, chronic constipation  Derm: See Hpi, neg new rash, neg flushing  Mu/sk: Neg joint pain, joint swelling   Psych: Neg anxiety  neuro: neg chronic headaches, muscle weakness  Endo: neg heat/cold intolerance, chronic fatigue    Objective:     Vitals:    01/22/19 1009   BP: 122/72   Weight: 85.3 kg (188 lb)   Height: 5' 5" (1.651 m)   PF: 420 L/min        Physical Exam      General: no acute distress, well developed well nourished       Assessment:       1. Hoarseness    2. Chronic cough    3. Esophageal dysphagia    4. Laryngopharyngeal reflux (LPR)    5. Dyspnea and respiratory abnormalities    6. Vitamin D deficiency    7. Chronic rhinitis        Plan:       Hoarseness    Chronic cough  -     Cat Dander IgE; Future; Expected date: 01/22/2019  -     Dog dander IgE; Future; Expected date: 01/22/2019  -     Mouse Epithelium IgE; Future; Expected date: 01/22/2019  -     Penicillium IgE; Future; Expected date: 01/22/2019  -     Cladosporium IgE; Future; Expected date: 01/22/2019  -     Aspergillus fumagatus IgE; Future; Expected date: 01/22/2019  -     RAST Allergen Candida albicans; Future; Expected date: 01/22/2019  -     Allergen-Alternaria Alternata; Future; Expected date: 01/22/2019  -     D. pteronyssinus IgE; Future; Expected date: 01/22/2019  -     D. farinae IgE; Future; Expected date: 01/22/2019  -     Allergen-Cockroach, Nepalese; Future; Expected date: 01/22/2019  -     RAST Allergen for Trichophyton rubrum; Future; Expected date: 01/22/2019  -     Allergen, E.Purpurascens; Future; Expected date: 01/22/2019  -     Curvularia lunata IgE; Future; Expected date: 01/22/2019  -     Bermuda grass IgE; Future; Expected date: 01/22/2019  -     Alcon IgE; Future; Expected date: 01/22/2019  -     Mohan grass IgE; Future; Expected date: 01/22/2019  -     Bahia grass IgE; Future; Expected date: 01/22/2019  -     Ragweed, short, common IgE; " Future; Expected date: 01/22/2019  -     RAST Allergen Maple ([place]); Future; Expected date: 01/22/2019  -     Allergen-Birch; Future; Expected date: 01/22/2019  -     Oak, white IgE; Future; Expected date: 01/22/2019  -     IgE Elm, American; Future; Expected date: 01/22/2019  -     Allergen-Maple [place]; Future; Expected date: 01/22/2019  -     Allergen-[place]; Future; Expected date: 01/22/2019  -     Allergen, White [place]; Future; Expected date: 01/22/2019  -     Allergen, Pecan Tree IgE; Future; Expected date: 01/22/2019  -     Italian [place] IgE; Future; Expected date: 01/22/2019  -     RAST Allergen [place]; Future; Expected date: 01/22/2019  -     Privet, common IgE; Future; Expected date: 01/22/2019  -     RAST Allergen Sweet Gum; Future; Expected date: 01/22/2019  -     IgE [place]; Future; Expected date: 01/22/2019  -     Mugwort IgE; Future; Expected date: 01/22/2019  -     Plantain, English IgE; Future; Expected date: 01/22/2019  -     Thistle, Russian IgE; Future; Expected date: 01/22/2019  -     Allergen-Common Pigweed; Future; Expected date: 01/22/2019  -     Marsh elder, rough IgE; Future; Expected date: 01/22/2019  -     RAST Allergen, Sheep [place]); Future; Expected date: 01/22/2019  -     Nettle IgE; Future; Expected date: 01/22/2019  -     IgE Dockweed, Yellow; Future; Expected date: 01/22/2019  -     IgE Fennel, Dog; Future; Expected date: 01/22/2019    Esophageal dysphagia    Laryngopharyngeal reflux (LPR)  -     sucralfate (CARAFATE) 1 gram tablet; Take 1 tablet (1 g total) by mouth 4 (four) times daily before meals and nightly.  Dispense: 120 tablet; Refill: 3  -     ranitidine (ZANTAC) 150 MG tablet; Take 1 tablet (150 mg total) by mouth 2 (two) times daily.  Dispense: 60 tablet; Refill: 3    Dyspnea and respiratory abnormalities  -     Cat Dander IgE; Future; Expected date: 01/22/2019  -     Dog dander IgE; Future; Expected date: 01/22/2019  -     Mouse  Epithelium IgE; Future; Expected date: 01/22/2019  -     Penicillium IgE; Future; Expected date: 01/22/2019  -     Cladosporium IgE; Future; Expected date: 01/22/2019  -     Aspergillus fumagatus IgE; Future; Expected date: 01/22/2019  -     RAST Allergen Candida albicans; Future; Expected date: 01/22/2019  -     Allergen-Alternaria Alternata; Future; Expected date: 01/22/2019  -     D. pteronyssinus IgE; Future; Expected date: 01/22/2019  -     D. farinae IgE; Future; Expected date: 01/22/2019  -     Allergen-Cockroach, [place]; Future; Expected date: 01/22/2019  -     RAST Allergen for Trichophyton rubrum; Future; Expected date: 01/22/2019  -     Allergen, E.Purpurascens; Future; Expected date: 01/22/2019  -     Curvularia lunata IgE; Future; Expected date: 01/22/2019  -     Bermuda grass IgE; Future; Expected date: 01/22/2019  -     Alcon IgE; Future; Expected date: 01/22/2019  -     Mohan grass IgE; Future; Expected date: 01/22/2019  -     Bahia grass IgE; Future; Expected date: 01/22/2019  -     Ragweed, short, common IgE; Future; Expected date: 01/22/2019  -     RAST Allergen Maple ([place]); Future; Expected date: 01/22/2019  -     Allergen-Birch; Future; Expected date: 01/22/2019  -     Oak, white IgE; Future; Expected date: 01/22/2019  -     IgE Elm, American; Future; Expected date: 01/22/2019  -     Allergen-Maple [place]; Future; Expected date: 01/22/2019  -     Allergen-[place]; Future; Expected date: 01/22/2019  -     Allergen, White Rayo; Future; Expected date: 01/22/2019  -     Allergen, Pecan Tree IgE; Future; Expected date: 01/22/2019  -     Italian [place] IgE; Future; Expected date: 01/22/2019  -     RAST Allergen [place]; Future; Expected date: 01/22/2019  -     Privet, common IgE; Future; Expected date: 01/22/2019  -     RAST Allergen Sweet Gum; Future; Expected date: 01/22/2019  -     IgE [place]; Future; Expected date: 01/22/2019  -     Mugwort IgE; Future; Expected date:  01/22/2019  -     Plantain, English IgE; Future; Expected date: 01/22/2019  -     Thistle, Russian IgE; Future; Expected date: 01/22/2019  -     Allergen-Common Pigweed; Future; Expected date: 01/22/2019  -     Marsh elder, rough IgE; Future; Expected date: 01/22/2019  -     RAST Allergen, Sheep [place]); Future; Expected date: 01/22/2019  -     Nettle IgE; Future; Expected date: 01/22/2019  -     IgE Dockweed, Yellow; Future; Expected date: 01/22/2019  -     IgE Fennel, Dog; Future; Expected date: 01/22/2019    Vitamin D deficiency  -     Vitamin D; Future    Chronic rhinitis       Hoarseness  - exacerbated   - symbicort 160 2 puffs bid  -continue magic mouthwash to help with throat irritation prn.   Start carafate qid before meals and bedtime.   Continue ranitidine but 150 mg bid  Restart protonix 40 bid x 6 weeks then qday    Chronic rhinitis- inhalant panel quest   - saline, pt elects to stop nasal sprays   - technique reviewed today    - continue plain mucinex 1200 mg bid- discussed D or pseudophed has risk to increase her BP.   Limit the amount of dairy as it may increase mucus production.     Dyspnea- reflux being treated, feels mucus is more of the trigger  - try to limit pnd as a source, ipratopium prn   - discontinue 2 puffs spiriva qday- pt states coughing fits   - continue lpr management per gi , start carafate for now.   - pt to continue pulmonary management- positive methacholine challenge   - trial off montelukast to simplify her medication routine.     Lpr/dysphagia - may be a poor perceiver of symptoms  - continue ppi - stopped ranitidine , pantoprazole 40 mg to BID- needs to decrease to help curb potential SE.   - esophagus stretched.   Spicy foods are temporally related to increased mucus.   - food skin prick testing with inhalant on procedure visit if possible.    - discussed to see Dr. Marroquin as food is getting stuck a few days per week.   - Dr. Marroquin may stretch esophagus.      Reviewed CBC, Ige, and Vitamin D labs, pt to continue 5000 IU daily.   Highest eos 300.       Follow up Feb.     Face to face > 50% > 40 mins discussing her medications and management.           Arabella Kelly M.D.  Allergy/Immunology  [facility]   [phone] phone  [phone] fax

## 2019-01-22 NOTE — PATIENT INSTRUCTIONS
Nose:  Continue saline- use as often as needed.   Try to use benadryl + phenylephrine as needed. Will only last every 4-6 hours. Monitor blood pressure.   Try ipratropium nasal spray as needed every 6 hours for running.      Throat:  As needed magic mouth wash - good for a year and shake really well prior to use.     Stomach:   Try carafate to coat the esophagus and stomach.   carafate take before breakfast , lunch, dinner, and at bedtime.   Ranitidine 150 mg twice per day. Or 300 mg once per day  Pantoprazole twice per day x 6 weeks, then do once per day, then see if we can use as needed.     Vitamin D 5000 IU daily.   Should have vitamin D rechecked to see where you are. Last check was in august. Check vitamin D quest.

## 2019-02-26 ENCOUNTER — OFFICE VISIT (OUTPATIENT)
Dept: PULMONOLOGY | Facility: CLINIC | Age: 59
End: 2019-02-26
Payer: COMMERCIAL

## 2019-02-26 ENCOUNTER — OFFICE VISIT (OUTPATIENT)
Dept: ALLERGY | Facility: CLINIC | Age: 59
End: 2019-02-26
Payer: COMMERCIAL

## 2019-02-26 VITALS
HEART RATE: 84 BPM | DIASTOLIC BLOOD PRESSURE: 80 MMHG | WEIGHT: 192 LBS | BODY MASS INDEX: 31.99 KG/M2 | OXYGEN SATURATION: 98 % | HEIGHT: 65 IN | SYSTOLIC BLOOD PRESSURE: 136 MMHG

## 2019-02-26 VITALS
OXYGEN SATURATION: 98 % | DIASTOLIC BLOOD PRESSURE: 80 MMHG | HEART RATE: 84 BPM | SYSTOLIC BLOOD PRESSURE: 136 MMHG | WEIGHT: 192 LBS | BODY MASS INDEX: 31.99 KG/M2 | HEIGHT: 65 IN

## 2019-02-26 DIAGNOSIS — E55.9 VITAMIN D DEFICIENCY: ICD-10-CM

## 2019-02-26 DIAGNOSIS — R06.00 DYSPNEA AND RESPIRATORY ABNORMALITIES: ICD-10-CM

## 2019-02-26 DIAGNOSIS — R06.89 DYSPNEA AND RESPIRATORY ABNORMALITIES: ICD-10-CM

## 2019-02-26 DIAGNOSIS — R05.3 CHRONIC COUGH: ICD-10-CM

## 2019-02-26 DIAGNOSIS — J45.40 MODERATE PERSISTENT ASTHMA, UNSPECIFIED WHETHER COMPLICATED: Primary | ICD-10-CM

## 2019-02-26 DIAGNOSIS — R49.0 HOARSENESS: ICD-10-CM

## 2019-02-26 DIAGNOSIS — R13.19 ESOPHAGEAL DYSPHAGIA: ICD-10-CM

## 2019-02-26 DIAGNOSIS — J31.0 CHRONIC RHINITIS: ICD-10-CM

## 2019-02-26 DIAGNOSIS — K21.9 LARYNGOPHARYNGEAL REFLUX (LPR): ICD-10-CM

## 2019-02-26 DIAGNOSIS — R49.0 VOICE HOARSENESS: Primary | ICD-10-CM

## 2019-02-26 PROCEDURE — 3008F PR BODY MASS INDEX (BMI) DOCUMENTED: ICD-10-PCS | Mod: ,,, | Performed by: INTERNAL MEDICINE

## 2019-02-26 PROCEDURE — 99214 OFFICE O/P EST MOD 30 MIN: CPT | Mod: ,,, | Performed by: INTERNAL MEDICINE

## 2019-02-26 PROCEDURE — 99214 OFFICE O/P EST MOD 30 MIN: CPT | Mod: ,,, | Performed by: ALLERGY & IMMUNOLOGY

## 2019-02-26 PROCEDURE — 3008F PR BODY MASS INDEX (BMI) DOCUMENTED: ICD-10-PCS | Mod: ,,, | Performed by: ALLERGY & IMMUNOLOGY

## 2019-02-26 PROCEDURE — 3008F BODY MASS INDEX DOCD: CPT | Mod: ,,, | Performed by: INTERNAL MEDICINE

## 2019-02-26 PROCEDURE — 99214 PR OFFICE/OUTPT VISIT, EST, LEVL IV, 30-39 MIN: ICD-10-PCS | Mod: ,,, | Performed by: INTERNAL MEDICINE

## 2019-02-26 PROCEDURE — 99214 PR OFFICE/OUTPT VISIT, EST, LEVL IV, 30-39 MIN: ICD-10-PCS | Mod: ,,, | Performed by: ALLERGY & IMMUNOLOGY

## 2019-02-26 PROCEDURE — 3008F BODY MASS INDEX DOCD: CPT | Mod: ,,, | Performed by: ALLERGY & IMMUNOLOGY

## 2019-02-26 RX ORDER — DIPHENHYDRAMINE HCL 25 MG
25 CAPSULE ORAL EVERY 6 HOURS PRN
COMMUNITY
End: 2023-04-06

## 2019-02-26 RX ORDER — PANTOPRAZOLE SODIUM 40 MG/1
40 TABLET, DELAYED RELEASE ORAL 2 TIMES DAILY
COMMUNITY
End: 2023-04-06

## 2019-02-26 NOTE — PATIENT INSTRUCTIONS
Nose:  Continue saline  Start mucinex as benadryl may be increasing viscosity. Take 600-1200 twice per day. Plain.     Stomach:  protonix before breakfast and dinner  carafate before lunch- 30-1 hr, snacks and at bedtime- right before bed.   Ranitidine 30 mins before lunch or after snack and after dinner.     Continue diet modification.     We will wait till labs result to know about allergy.     Consider alvesco pro drug for hoarseness if needed.

## 2019-02-26 NOTE — PROGRESS NOTES
"Subjective:       Patient ID: Valdez Salazar is a 58 y.o. female.    Chief Complaint: Cough (still coughing, but not as bad; still having congestion and hoarseness)       Pt presents for chronic cough, rhinitis, dysphagia.     Other doctors in tx: Dr. Hart; Dr. Marroquin;  Dr. Corbin.     Recently, she had her knee replaced.     At the last visit, we started carafate to see if this would help with lpr sx and hoarseness or sore throat.     Weather changes typically "taken her breath away"  She saw Darcy Hamilton this am.     Rhinitis: serum IgE ordered in Jan but no results on [employer] website- did yesterday.   Condition: pnd improved  Her voice is hoarse  ENT - Dr. Peterson states she is getting a nodule on her vocal cord. Was advised to increase water intake and to decrease force of cough due to nodule.   Pt states she stopped nasal sprays post her ENT visit.   Pt states being off the sprays, there really wasn't a change in her symptoms vs being on it.   Still using saline.     LPR:    Was started on carafate and bid protonix x 6 weeks.   She can only get carafate in about twice per day.   Pt recently had madrigal peppers in a meal she ate and felt that the mucus filled up in her throat, she had ranitidine 150 mg that then calmed down the episode along with benadryl.     Pt states she needs to clear the mucus to breathe and cough.   States everything she eats, can't breathe. Takes ranitidine - may take after eating, and benadryl-D that may help temporarily.   Pt states once she takes these meds she feels she can breathe.   She stopped the protonix 40 mg. Feels wasn't helping and took in the evening.     Does notice diet changes have improved symptoms.   She may take benadryl daily. This may increase mucus viscosity.       Dr. Marroquin- esophagus stretching may need to occur.   Getting food stuck few days per week with breads. 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   Pt notes onions, bellpeppers, heavily seasoned foods " "can exacerbate mucus.       Prior history   In the past she has tried omeprazole 40 mg BID, but Dr. farrar took her off this regimen.   He placed her on protonix 40 mg once per day.   Dr. Hart performed scope and dx with LPR and sent her to GI, Dr. Farrar. No vocal cord lesions at that time, but now may be developing due to constant throat clearing.     Cough:  Condition: improved, but the mucus from eating has been somewhat improved but not resolved.     Sx: coughing spells, thick mucus- main issue is the thick mucus    Has symptoms despite the spirva and symbicort. Stopped spiriva 2 weeks ago.   Increased use of rescue inhaler.   Intensity: They can be bad enough to where she may go to the ED- however she is 90 % improved.   Tx: started symbicort 80 1 puff bid- now improved but increased to 160 2 puffs twice per day by Dr. Corbin,  spiriva 2 puff qday rx given 1 puff twice per day, mucinex D. Magic mouthwash qhs.  We had a trial off montelukast. She is on a medrol pack and finishing for orthopedics.    -Rescue inhaler may or may not help, not a consistent efficacy. mucinex-D helps with thickness of mucus. Discussed D can increase her BP.   -She has been on an antibiotic but isn't unsure of the name or type. - early June 2018. It helped the "color " of the mucus but hasn't had improvement in sx.   Trigger: when she eats, more mucus comes and she coughs. Doesn't matter if it's water or food.   Protonix 40 mg given for BID x 4 weeks then once daily. Thinks that it helped with the mucus. BID was a better formulation than qday. But pt has been inconsistent with this.     Duration: She has been having these symptoms for the past year.    Voice is still improved off breo.   Of note, Since she's been out of [facility], her symptoms have been bad.   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     Imaging:  Ct chest reviewed and no significant findings.   Sinus ct in 2015 reviewed and no significant findings. " "   Discussed possibly repeating as it has been 3 years since last scan.     Atopic history  Asthma: no history - but had a methacholine challenge- per patient report positive.   - stopped breo due to dysphonia. and montelukast was d/c. Now seeing Dr. Corbin.   Rhinitis: see above  Dysphagia: at times food gets stuck- see Dr. Sheltonor for reflux- needs f/u  Food allergy: none   Oral allergy: none       Review of Systems      General: neg unexpected weight changes, fevers, chills, night sweats, malaise  HEENT: see hpi, Neg eye pain, vision changes, ear drainage, nose bleeds, throat tightness, sores in the mouth  CV: Neg chest pain, palpitations, swelling  Resp: see hpi, neg shortness of breath, hemoptysis  GI: see hpi, neg night abdominal pain, chronic diarrhea, chronic constipation  Derm: See Hpi, neg new rash, neg flushing  Mu/sk: Neg joint pain, joint swelling   Psych: Neg anxiety  neuro: neg chronic headaches, muscle weakness  Endo: neg heat/cold intolerance, chronic fatigue    Objective:     Vitals:    02/26/19 1006   BP: 136/80   Pulse: 84   SpO2: 98%   Weight: 87.1 kg (192 lb)   Height: 5' 5" (1.651 m)   PF: 400 L/min        Physical Exam      General: no acute distress, well developed well nourished   HEENT:   Head:normocephalic atraumatic  Eyes: YOU, EOMI, Neg injection, scleral icterus, or conjunctival papillary hypertrophy.  Ears: tm clear bilaterally, normal canal  Nose: 2-3+ inferior turbinates pink, neg nasal polyps            Mucosa: dry             Septal irritation: none   OP: mucus membranes moist, - cobblestoning, - PND, neg erythema or lesions  Neck: supple, Full range of motion, neg lymphadenopathy  Chest: full respiratory excursion no abnormal chest abnormality  Resp: clear to ascultation bilaterally  CV: RRR, neg MRG, brisk capillary refill  Abdomen: BS+, non tender, non distended  Ext:  Neg clubbing, cyanosis, pitting edema  Skin: Neg rashes or lesions  Lymph: neg supraclavicular, axillary "         Assessment:       1. Voice hoarseness    2. Dyspnea and respiratory abnormalities    3. Laryngopharyngeal reflux (LPR)    4. Esophageal dysphagia    5. Chronic cough    6. Vitamin D deficiency    7. Chronic rhinitis        Plan:       Voice hoarseness    Dyspnea and respiratory abnormalities    Laryngopharyngeal reflux (LPR)    Esophageal dysphagia    Chronic cough    Vitamin D deficiency    Chronic rhinitis      Hoarseness  - exacerbated from meal yesterday   - symbicort 160 2 puffs bid- we may need to consider a pro drug alvesco to see if this helps with hoarseness.   -continue magic mouthwash to help with throat irritation prn.   - carafate qid before meals and bedtime. Pt not to be so strict on timing to take and try to get in more than 2 pills per day.   Continue ranitidine but 150 mg bid  protonix 40 bid x 6 weeks but pt wants to continue for 8 weeks to 3 months.     Chronic rhinitis- inhalant panel quest - done yesterday   - saline, pt elects to stop nasal sprays   - technique reviewed today    - continue plain mucinex 1200 mg bid- discussed D or pseudophed has risk to increase her BP.   Limit the amount of dairy as it may increase mucus production.     Dyspnea- reflux being treated, feels mucus is more of the trigger  - try to limit pnd as a source, ipratopium prn   - discontinue 2 puffs spiriva qday- pt states coughing fits   - continue lpr management per gi   - pt to continue pulmonary management- positive methacholine challenge   - trial off montelukast to simplify her medication routine.     Lpr/dysphagia - starting to realize reflux as an exacerbater.   - continue ppi -  pantoprazole 40 mg to BID- needs to decrease to help curb potential SE. Would like to extend to 3 months.   - esophagus stretching may need to occur with GI.   Spicy foods are temporally related to increased mucus. Especially bell pepper onion and garlic   - food skin prick testing with inhalant on procedure visit if possible.     - discussed to see Dr. Marroquin as food is getting stuck a few days per week.   - Dr. Marroquin may stretch esophagus.   - carafate qid before meals and bedtime.   -Continue ranitidine but 150 mg bid  - protonix 40 bid x 6 weeks then qday- trial: done but wants extend to 3 months.     Reviewed CBC, Ige, and Vitamin D labs, pt to continue 5000 IU daily.   Highest eos 300.       Follow up in 3 months.         Arabella Kelly M.D.  Allergy/Immunology  [facility]   [phone] phone  [phone] fax

## 2019-02-26 NOTE — PROGRESS NOTES
"Office Visit    Patient Name: Valdze Salazar  MRN: [id number]  : 1960      Reason for visit: Asthma    HPI:     2018 - Pt with h/o asthma ( has seen Dr Bill in past), has trouble with dyspnea.  Has methacholine challenge test which was "positive".  Was on singulair and BREO, still had difficulties and "thick mucus".  Has trouble when reclining.  Has seen GI to evaluate for reflux ( had esophageal "stretched").  Was referred to Dr Kelly for evaluation and now here to establish care.  Hutchins also seen Dr Hart for ENT evaluation.  Had medication changes done by dr Kelly and feels better overall.    2018 - HAs been doing fine until last few days then she had some waking episodes.  This AM had some increased chest tightness and has used rescue inhaler more.  Has had some dysphagia and saw Dr Marroquin and was started on diflucan.  Feels better with regards with this.  Has not noted wheezing but does feel tight.    12/3/2018 - Here for follow up to have left knee replacement at [facility] this Friday, breathing is ok, has some cough and mucus.  After last vii she developed laryngitis which has yet to clear (? If related to her Symbicort) - she is to see Dr Peterson tomorrow AM.    Preoperative Pulmonary Clearance    Pt was seen for preoperative clearance from a pulmonary standpoint for planned left polly replacement.  The risks of the procedure have been discussed with the patient including the risk of prolonged ventilatory support/difficulty weaning from ventilator, post-procedure pneumonia, post-procedure respiratory failure and DVT/pulmonary embolism.  The pt is currently stable from their respiratory status and they are cleared for the planned procedure at increased risk.  The risk should not be considered prohibitive.  If you have any questions please contact me.  This clearance is pending evaluation by ENT for persistent hoarseness.    2019 - Here for follow up, doing better overall.  Has seen ENT " and was found to have a vocal cord nodule, is being treated for reflux.  Still with significant vocal issues.  Mucus has been better.  Feels that asthma control is better and cough is much better than where it was.      Asthma Flowsheet    Asthma -  Moderate  Persistent       Controlled    ACT - 21    Baseline PFT - FEV1- 95 %    Serum eosinophil count - 300  Serum IgE - < 2    Allergy testing - na   Desensitization - no    Therapy: ICS/LABA/LAMA +      PRN albuterol +                 Singulair                  Xolair                  Nucala                  Cinqair       Past Medical History    Past Medical History:   Diagnosis Date    Acid reflux     Allergic sinusitis     Asthma     Hoarseness     PONV (postoperative nausea and vomiting)        Past Surgical History    Past Surgical History:   Procedure Laterality Date    ADENOIDECTOMY      ARTHROPLASTY, KNEE, TOTAL Left 12/7/2018    [facility]    cesarian section      ctr      ELBOW SURGERY      and hardware removal    HYSTERECTOMY      INCONTINENCE SURGERY      INJECTION-JOINT CARPAL TUNNEL Left 6/5/2015    [facility] OR    KNEE ARTHROSCOPY      rectocele      RELEASE-CARPAL TUNNEL Right 6/5/2015    [facility] OR    TONSILLECTOMY         Medications      Current Outpatient Medications:     albuterol 90 mcg/actuation inhaler, Inhale 2 puffs into the lungs every 4 to 6 hours as needed for Wheezing. Rescue, Disp: 18 g, Rfl: 6    azelastine (ASTELIN) 137 mcg (0.1 %) nasal spray, 1 spray per nare twice per day, may use up to 4 times daily 1 spray per nare for congestion and post nasal drip, Disp: 90 mL, Rfl: 4    benzonatate (TESSALON) 200 MG capsule, Take 200 mg by mouth 3 (three) times daily as needed for Cough., Disp: , Rfl:     budesonide-formoterol 160-4.5 mcg (SYMBICORT) 160-4.5 mcg/actuation HFAA, Inhale 2 puffs into the lungs  every 12 (twelve) hours. Controller, Disp: 1 Inhaler, Rfl: 11    diphenhydrAMINE (BENADRYL) 25 mg capsule, Take 25 mg by mouth every 6 (six) hours as needed for Itching., Disp: , Rfl:     inhalat. spacing dev,sm. mask (AEROCHAMBER PLUS FLOW-VU,S MSK) Spcr, 1 Device by Misc.(Non-Drug; Combo Route) route as needed., Disp: 1 each, Rfl: 3    ipratropium (ATROVENT) 0.03 % nasal spray, 2 sprays by Nasal route 3 (three) times daily., Disp: 30 mL, Rfl: 6    ranitidine (ZANTAC) 150 MG tablet, Take 1 tablet (150 mg total) by mouth 2 (two) times daily., Disp: 60 tablet, Rfl: 3    sucralfate (CARAFATE) 1 gram tablet, Take 1 tablet (1 g total) by mouth 4 (four) times daily before meals and nightly., Disp: 120 tablet, Rfl: 3    valACYclovir (VALTREX) 1000 MG tablet, Take 1,000 mg by mouth once daily., Disp: , Rfl: 3    Allergies    Review of patient's allergies indicates:   Allergen Reactions    Codeine Nausea And Vomiting    Stadol [butorphanol tartrate] Shortness Of Breath     Pt reports stops breathing    Wiley-dur Shortness Of Breath     Stops breathing    Morphine Nausea And Vomiting     Severe.      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   HENT: Positive for congestion. Negative for nosebleeds.         + hoarseness   Eyes: Negative for pain.   Respiratory: Positive for cough. Negative for hemoptysis, sputum production, shortness of breath,  "wheezing and stridor.    Cardiovascular: Negative for chest pain, palpitations, orthopnea, claudication, leg swelling and PND.   Gastrointestinal: Positive for heartburn. Negative for abdominal pain, blood in stool, constipation, diarrhea, melena, nausea and vomiting.   Genitourinary: Negative for dysuria, flank pain, frequency, hematuria and urgency.   Musculoskeletal: Negative for falls and myalgias.   Skin: Negative for itching and rash.   Neurological: Negative for sensory change, focal weakness and weakness.   Psychiatric/Behavioral: Negative for depression. The patient is not nervous/anxious.        Physical Exam    Vitals:    02/26/19 0932   BP: 136/80   Pulse: 84   SpO2: 98%   Weight: 87.1 kg (192 lb)   Height: 5' 5" (1.651 m)       Physical Exam   Constitutional: She is oriented to person, place, and time. She appears well-developed and well-nourished. No distress.   HENT:   Head: Normocephalic and atraumatic.   Right Ear: External ear normal.   Left Ear: External ear normal.   Nose: Nose normal.   Mouth/Throat: Oropharynx is clear and moist.   + minimal post nasal drip   Eyes: Conjunctivae and EOM are normal. Pupils are equal, round, and reactive to light.   Neck: Normal range of motion. Neck supple. No JVD present. No tracheal deviation present. No thyromegaly present.   Cardiovascular: Normal rate, regular rhythm, normal heart sounds and intact distal pulses. Exam reveals no gallop and no friction rub.   No murmur heard.  Pulmonary/Chest: Effort normal and breath sounds normal. No stridor. No respiratory distress. She has no wheezes. She has no rales. She exhibits no tenderness.   Abdominal: Soft. Bowel sounds are normal. She exhibits no distension. There is no tenderness.   Musculoskeletal: Normal range of motion. She exhibits no edema or tenderness.   Lymphadenopathy:     She has no cervical adenopathy.   Neurological: She is alert and oriented to person, place, and time. No cranial nerve deficit. "   Skin: Skin is warm and dry. She is not diaphoretic.   Psychiatric: She has a normal mood and affect. Her behavior is normal.   Nursing note and vitals reviewed.      Labs    Lab Results   Component Value Date    WBC 10.28 12/09/2018    HGB 11.1 (L) 12/09/2018    HCT 35.0 (L) 12/09/2018     12/09/2018       Sodium   Date Value Ref Range Status   12/09/2018 138 136 - 145 mmol/L Final     Potassium   Date Value Ref Range Status   12/09/2018 4.2 3.5 - 5.1 mmol/L Final     Chloride   Date Value Ref Range Status   12/09/2018 104 95 - 110 mmol/L Final     CO2   Date Value Ref Range Status   12/09/2018 27 23 - 29 mmol/L Final     Glucose   Date Value Ref Range Status   12/09/2018 114 (H) 70 - 110 mg/dL Final     BUN, Bld   Date Value Ref Range Status   12/09/2018 6 6 - 20 mg/dL Final     Creatinine   Date Value Ref Range Status   12/09/2018 0.7 0.5 - 1.4 mg/dL Final   07/29/2013 0.7 0.5 - 1.4 mg/dL Final     Calcium   Date Value Ref Range Status   12/09/2018 8.9 8.7 - 10.5 mg/dL Final   07/29/2013 9.1 8.7 - 10.5 mg/dL Final     Total Protein   Date Value Ref Range Status   07/29/2013 6.7 6.0 - 8.4 g/dL Final     ALBUMIN   Date Value Ref Range Status   07/29/2013 3.4 (L) 3.5 - 5.2 g/dL Final     Alkaline Phosphatase   Date Value Ref Range Status   07/29/2013 79 55 - 135 U/L Final     AST   Date Value Ref Range Status   07/29/2013 17 10 - 40 U/L Final     ALT   Date Value Ref Range Status   07/29/2013 18 10 - 44 U/L Final     Anion Gap   Date Value Ref Range Status   12/09/2018 7 (L) 8 - 16 mmol/L Final   07/29/2013 13 5 - 15 meq/L Final       Xrays        Impression/Plan    Problem List Items Addressed This Visit        ENT    Chronic rhinitis  - continue present treatments       Pulmonary    Chronic cough  - multifactorial  - better       GI    Laryngopharyngeal reflux (LPR)  - being treated      Other Visit Diagnoses     Asthma, unspecified asthma severity, unspecified whether complicated, unspecified whether  persistent  - better and at baseline  - RTC 3 month    Vitamin D deficiency  - being replaced    Laryngitis  - still an issue  - ? If related to inhaled steroids  - will follow for now    Allergies  - sees Dr Robin Corbin MD

## 2019-02-28 LAB
1,25(OH)2D SERPL-MCNC: 58 PG/ML (ref 18–72)
1,25(OH)2D2 SERPL-MCNC: <8 PG/ML
1,25(OH)2D3 SERPL-MCNC: 58 PG/ML
A ALTERNATA IGE QN: <0.1 KU/L
A FUMIGATUS IGE QN: <0.1 KU/L
BAHIA GRASS IGE QN: <0.1 KU/L
BERMUDA GRASS IGE QN: <0.1 KU/L
BOXELDER IGE QN: <0.1 KU/L
C ALBICANS IGE QN: <0.1 KU/L
C HERBARUM IGE QN: <0.1 KU/L
C LUNATA IGE QN: <0.1 KU/L
CAT DANDER IGE QN: <0.1 KU/L
CMN PIGWEED IGE QN: <0.1 KU/L
COMMON RAGWEED IGE QN: <0.1 KU/L
COTTONWOOD IGE QN: <0.1 KU/L
D FARINAE IGE QN: <0.1 KU/L
D PTERONYSS IGE QN: <0.1 KU/L
DEPRECATED A ALTERNATA IGE RAST QL: 0
DEPRECATED A FUMIGATUS IGE RAST QL: 0
DEPRECATED BAHIA GRASS IGE RAST QL: 0
DEPRECATED BERMUDA GRASS IGE RAST QL: 0
DEPRECATED BOXELDER IGE RAST QL: 0
DEPRECATED C ALBICANS IGE RAST QL: 0
DEPRECATED C HERBARUM IGE RAST QL: 0
DEPRECATED C LUNATA IGE RAST QL: 0
DEPRECATED CAT DANDER IGE RAST QL: 0
DEPRECATED COMMON PIGWEED IGE RAST QL: 0
DEPRECATED COMMON RAGWEED IGE RAST QL: 0
DEPRECATED COTTONWOOD IGE RAST QL: 0
DEPRECATED D FARINAE IGE RAST QL: 0
DEPRECATED D PTERONYSS IGE RAST QL: 0
DEPRECATED DOG DANDER IGE RAST QL: 0
DEPRECATED DOG FENNEL IGE RAST QL: 0
DEPRECATED E PURPURASCENS IGE RAST QL: 0
DEPRECATED ITALIAN CYPRESS IGE RAST QL: 0
DEPRECATED JOHNSON GRASS IGE RAST QL: 0
DEPRECATED LONDON PLANE IGE RAST QL: 0
DEPRECATED MARSH ELDER IGE RAST QL: 0
DEPRECATED MOUSE EPITH IGE RAST QL: 0
DEPRECATED NETTLE IGE RAST QL: 0
DEPRECATED P NOTATUM IGE RAST QL: 0
DEPRECATED PECAN/HICK TREE IGE RAST QL: 0
DEPRECATED ROACH IGE RAST QL: 0
DEPRECATED SHEEP SORREL IGE RAST QL: 0
DEPRECATED SILVER BIRCH IGE RAST QL: 0
DEPRECATED T RUBRUM IGE RAST QL: 0
DEPRECATED TIMOTHY IGE RAST QL: 0
DEPRECATED WHITE ASH IGE RAST QL: 0
DEPRECATED WHITE ELM IGE RAST QL: 0
DEPRECATED WHITE MULBERRY IGE RAST QL: 0
DEPRECATED WHITE OAK IGE RAST QL: 0
DEPRECATED YELLOW DOCK IGE RAST QL: 0 CLASS
DOG DANDER IGE QN: <0.1 KU/L
DOG FENNEL IGE QN: <0.1 KU/L
E PURPURASCENS IGE QN: <0.1 KU/L
ITALIAN CYPRESS IGE QN: <0.1 KU/L
JOHNSON GRASS IGE QN: <0.1 KU/L
LONDON PLANE IGE QN: <0.1 KU/L
MARSH ELDER IGE QN: <0.1 KU/L
MOUSE EPITH IGE QN: <0.1 KU/L
NETTLE IGE QN: <0.1 KU/L
P NOTATUM IGE QN: <0.1 KU/L
PECAN/HICK TREE IGE QN: <0.1 KU/L
REF LAB TEST REF RANGE: NORMAL
REF LAB TEST REF RANGE: NORMAL
ROACH IGE QN: <0.1 KU/L
SHEEP SORREL IGE QN: <0.1 KU/L
SILVER BIRCH IGE QN: <0.1 KU/L
T RUBRUM IGE QN: <0.1 KU/L
TIMOTHY IGE QN: <0.1 KU/L
WHITE ASH IGE QN: <0.1 KU/L
WHITE ELM IGE QN: <0.1 KU/L
WHITE MULBERRY IGE QN: <0.1 KU/L
WHITE OAK IGE QN: <0.1 KU/L
YELLOW DOCK IGE QN: <0.1 KU/L

## 2019-03-14 ENCOUNTER — PATIENT MESSAGE (OUTPATIENT)
Dept: ALLERGY | Facility: CLINIC | Age: 59
End: 2019-03-14

## 2019-05-28 ENCOUNTER — OFFICE VISIT (OUTPATIENT)
Dept: PULMONOLOGY | Facility: CLINIC | Age: 59
End: 2019-05-28
Payer: COMMERCIAL

## 2019-05-28 ENCOUNTER — OFFICE VISIT (OUTPATIENT)
Dept: ALLERGY | Facility: CLINIC | Age: 59
End: 2019-05-28
Payer: COMMERCIAL

## 2019-05-28 VITALS
DIASTOLIC BLOOD PRESSURE: 68 MMHG | BODY MASS INDEX: 34.82 KG/M2 | SYSTOLIC BLOOD PRESSURE: 120 MMHG | OXYGEN SATURATION: 97 % | HEIGHT: 65 IN | BODY MASS INDEX: 34.82 KG/M2 | HEIGHT: 65 IN | WEIGHT: 209 LBS | DIASTOLIC BLOOD PRESSURE: 78 MMHG | HEART RATE: 66 BPM | WEIGHT: 209 LBS | SYSTOLIC BLOOD PRESSURE: 120 MMHG

## 2019-05-28 DIAGNOSIS — R06.89 DYSPNEA AND RESPIRATORY ABNORMALITIES: ICD-10-CM

## 2019-05-28 DIAGNOSIS — J45.40 MODERATE PERSISTENT ASTHMA, UNSPECIFIED WHETHER COMPLICATED: Primary | ICD-10-CM

## 2019-05-28 DIAGNOSIS — K21.9 LARYNGOPHARYNGEAL REFLUX (LPR): ICD-10-CM

## 2019-05-28 DIAGNOSIS — R06.00 DYSPNEA AND RESPIRATORY ABNORMALITIES: ICD-10-CM

## 2019-05-28 DIAGNOSIS — J31.0 NON-ALLERGIC RHINITIS: ICD-10-CM

## 2019-05-28 DIAGNOSIS — R05.3 CHRONIC COUGH: ICD-10-CM

## 2019-05-28 DIAGNOSIS — R49.0 HOARSENESS: Primary | ICD-10-CM

## 2019-05-28 PROCEDURE — 99213 OFFICE O/P EST LOW 20 MIN: CPT | Mod: ,,, | Performed by: ALLERGY & IMMUNOLOGY

## 2019-05-28 PROCEDURE — 3008F PR BODY MASS INDEX (BMI) DOCUMENTED: ICD-10-PCS | Mod: ,,, | Performed by: INTERNAL MEDICINE

## 2019-05-28 PROCEDURE — 3008F PR BODY MASS INDEX (BMI) DOCUMENTED: ICD-10-PCS | Mod: ,,, | Performed by: ALLERGY & IMMUNOLOGY

## 2019-05-28 PROCEDURE — 99214 OFFICE O/P EST MOD 30 MIN: CPT | Mod: ,,, | Performed by: INTERNAL MEDICINE

## 2019-05-28 PROCEDURE — 99214 PR OFFICE/OUTPT VISIT, EST, LEVL IV, 30-39 MIN: ICD-10-PCS | Mod: ,,, | Performed by: INTERNAL MEDICINE

## 2019-05-28 PROCEDURE — 3008F BODY MASS INDEX DOCD: CPT | Mod: ,,, | Performed by: ALLERGY & IMMUNOLOGY

## 2019-05-28 PROCEDURE — 3008F BODY MASS INDEX DOCD: CPT | Mod: ,,, | Performed by: INTERNAL MEDICINE

## 2019-05-28 PROCEDURE — 99213 PR OFFICE/OUTPT VISIT, EST, LEVL III, 20-29 MIN: ICD-10-PCS | Mod: ,,, | Performed by: ALLERGY & IMMUNOLOGY

## 2019-05-28 NOTE — PROGRESS NOTES
Subjective:       Patient ID: Valdez Salazar is a 59 y.o. female.    Chief Complaint: Hoarse (voice hoarseness - pt reports improvement)       Pt presents for chronic cough, rhinitis, dysphagia.     Other doctors in tx: Dr. Hart; Dr. Marroquin;  Dr. Corbin.     Rhinitis: serum IgE - negative NON-allergic.   Condition: pnd improved  Her voice is hoarse but improving  ENT - Dr. Peterson states she is getting a nodule on her vocal cord. Was advised to increase water intake and to decrease force of cough due to nodule.   Pt states she stopped nasal sprays post her ENT visit.   Pt states being off the sprays, there really wasn't a change in her symptoms vs being on it.   Still using saline.     LPR:    Was started on carafate and bid protonix x 6 weeks. Using zantac prn after dinner if she feels there are seasonings.   Main issue is reflux that is causing mucus.   She can only get carafate in about twice per day.   A trigger has been bell peppers in a meal she ate and felt that the mucus filled up in her throat, she had ranitidine 150 mg that then calmed down the episode along with benadryl.   Pt states she needs to clear the mucus to breathe and cough. Zantac helps.   States everything she eats, can't breathe. Takes ranitidine - may take after eating, and benadryl-D that may help temporarily.   Pt states once she takes these meds she feels she can breathe.   She stopped the protonix 40 mg. Feels wasn't helping and took in the evening.   Does notice diet changes have improved symptoms.   She may take benadryl daily. This may increase mucus viscosity.   Dr. Marroquin- esophagus stretching may need to occur.   Getting food stuck few days per week with breads. 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   Pt notes onions, bellpeppers, heavily seasoned foods can exacerbate mucus.     Prior history   In the past she has tried omeprazole 40 mg BID, but Dr. marroquin took her off this regimen.   Dr. Hart performed scope and dx with LPR  "and sent her to Dr. Cara GONZALEZ. No vocal cord lesions at that time, but now may be developing due to constant throat clearing.     Cough:  Condition: improved, but the mucus from eating has been somewhat improved but not resolved.   Trigger: weather changes.   Sx: coughing spells, thick mucus- main issue is the thick mucus    Has symptoms despite the spirva and symbicort. Stopped spiriva 2 weeks ago.   Increased use of rescue inhaler.   Intensity: They can be bad enough to where she may go to the ED- however she is 90 % improved.   Tx: started symbicort 80 1 puff bid- now improved but increased to 160 2 puffs twice per day by Dr. Corbin,  spiriva 2 puff qday rx given 1 puff twice per day, mucinex D. Magic mouthwash qhs.  We had a trial off montelukast. She is on a medrol pack and finishing for orthopedics.    -Rescue inhaler may or may not help, not a consistent efficacy. mucinex-D helps with thickness of mucus. Discussed D can increase her BP.   -She has been on an antibiotic but isn't unsure of the name or type. - early June 2018. It helped the "color " of the mucus but hasn't had improvement in sx.   Trigger: when she eats, more mucus comes and she coughs. Doesn't matter if it's water or food.   Protonix 40 mg given for BID x 4 weeks then once daily. Thinks that it helped with the mucus. BID was a better formulation than qday. But pt has been inconsistent with this.     Duration: She has been having these symptoms for the past year.    Voice is still improved off breo.   Added cranberry and lime at night. States her throat as much.   Of note, Since she's been out of [facility], her symptoms have been bad.   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     Imaging:  Ct chest reviewed and no significant findings.   Sinus ct in 2015 reviewed and no significant findings.    Discussed possibly repeating as it has been 3 years since last scan.     Atopic history  Asthma: no history - but had a methacholine " "challenge- per patient report positive.   - stopped breo due to dysphonia. and montelukast was d/c. Now seeing Dr. Corbin.   Rhinitis: see above  Dysphagia: at times food gets stuck- see  Cara for reflux- needs f/u  Food allergy: none   Oral allergy: none       Review of Systems      General: neg unexpected weight changes, fevers, chills, night sweats, malaise  HEENT: see hpi, Neg eye pain, vision changes, ear drainage, nose bleeds, throat tightness, sores in the mouth  CV: Neg chest pain, palpitations, swelling  Resp: see hpi, neg shortness of breath, hemoptysis  GI: see hpi, neg night abdominal pain, chronic diarrhea, chronic constipation  Derm: See Hpi, neg new rash, neg flushing  Mu/sk: Neg joint pain, joint swelling   Psych: Neg anxiety  neuro: neg chronic headaches, muscle weakness  Endo: neg heat/cold intolerance, chronic fatigue    Objective:     Vitals:    05/28/19 0957   BP: 120/78   Weight: 94.8 kg (209 lb)   Height: 5' 5" (1.651 m)   PF: 420 L/min        Physical Exam      General: no acute distress, well developed well nourished   HEENT:   Head:normocephalic atraumatic  Eyes: YOU, EOMI, Neg injection, scleral icterus, or conjunctival papillary hypertrophy.  Ears: tm clear bilaterally, normal canal  Nose: 2-3+ inferior turbinates pink, neg nasal polyps            Mucosa: moist             Septal irritation: none   OP: mucus membranes moist, - cobblestoning, - PND, neg erythema or lesions  Neck: supple, Full range of motion, neg lymphadenopathy  Chest: full respiratory excursion no abnormal chest abnormality  Resp: clear to ascultation bilaterally  CV: RRR, neg MRG, brisk capillary refill  Abdomen: BS+, non tender, non distended  Ext:  Neg clubbing, cyanosis, pitting edema  Skin: Neg rashes or lesions  Lymph: neg supraclavicular, axillary         Assessment:       1. Hoarseness    2. Non-allergic rhinitis    3. Dyspnea and respiratory abnormalities    4. Laryngopharyngeal reflux (LPR)      "   Plan:       Hoarseness    Non-allergic rhinitis    Dyspnea and respiratory abnormalities    Laryngopharyngeal reflux (LPR)        Hoarseness  - improved  - symbicort 160 2 puffs bid- we may need to consider a pro drug alvesco to see if this helps with hoarseness.   -continue magic mouthwash to help with throat irritation prn.   - carafate qid before meals and bedtime. Pt not to be so strict on timing to take and try to get in more than 2 pills per day.   Continue ranitidine but 150 mg bid  protonix 40 bid x 6 weeks but pt wants to continue for 8 weeks to 3 months.   Defer to GI for reflux management.     Chronic rhinitis- inhalant panel quest - done yesterday and negative.   - saline, pt elects to stop nasal sprays   - technique reviewed today    - continue plain mucinex 1200 mg bid- discussed D or pseudophed has risk to increase her BP.   Limit the amount of dairy as it may increase mucus production.     Dyspnea- reflux being treated, feels mucus is more of the trigger- medications are helping.   - try to limit pnd as a source, ipratopium prn   - discontinue 2 puffs spiriva qday- pt states coughing fits   - continue lpr management per gi   - pt to continue pulmonary management- positive methacholine challenge   - trial off montelukast to simplify her medication routine.     Lpr/dysphagia - starting to realize reflux as an exacerbater.   - continue ppi -  pantoprazole 40 mg to BID- needs to decrease to help curb potential SE. Would like to extend to 3 months.   - esophagus stretching may need to occur with GI.   Spicy foods are temporally related to increased mucus. Especially bell pepper onion and garlic   - discussed to see Dr. Marroquin as food is getting stuck a few days per week.   - Dr. Marroquin may stretch esophagus.   - carafate qid before meals and bedtime.   -Continue ranitidine but 150 mg bid  - protonix 40 bid x 6 weeks then qday- trial: done but wants extend to 3 months.     Reviewed CBC, Ige, and  Vitamin D labs, pt to continue 5000 IU daily.   Highest eos 300.       Follow up in 6-12 months.         Arabella Kelly M.D.  Allergy/Immunology  [facility]   [phone] phone  [phone] fax

## 2019-05-28 NOTE — PATIENT INSTRUCTIONS
Continue your current management as you are much better.       Follow up in 6-12 months, if you want to pursue skin prick testing to the environment on a procedure day.     Instructions For Skin Testing    Please HOLD all antihistamines (Benadryl, zyrtec, Claritin, loratidine, cetirizine, diphenhydramine, medications with pm in the name, Allegra, fexofenadine) 7 days prior to testing.     Please HOLD zantac, ranitidine, pepsid, famotidine 3 days prior to your testing.     Please HOLD azelastine, astelin, astepro, dymista three days prior to your skin testing    Please HOLD your Beta Blocker (ask the office if you are on one.) These medicines typically end in olol. HOLD the morning of skin testing.    Please HOLD clonidine the morning of skin testing.     Please HOLD any Tricyclic antidepressants 14 days prior to skin testing. Please consult your prescribing doctor prior to discontinuing this medication.     Please HOLD Seroquel 14 days prior to skin testing. Please consult your prescribing physician prior to stopping this medication.     After skin testing, you may resume taking your HELD medications.     You may CONTINUE Montelukast , Flonase, Fluticasone, Nasonex, or other intranasal steroid.

## 2019-05-28 NOTE — PROGRESS NOTES
"Office Visit    Patient Name: Valdez Salazar  MRN: [id number]  : 1960      Reason for visit: Asthma    HPI:     2018 - Pt with h/o asthma ( has seen Dr Bill in past), has trouble with dyspnea.  Has methacholine challenge test which was "positive".  Was on singulair and BREO, still had difficulties and "thick mucus".  Has trouble when reclining.  Has seen GI to evaluate for reflux ( had esophageal "stretched").  Was referred to Dr Kelly for evaluation and now here to establish care.  Hutchins also seen Dr Hart for ENT evaluation.  Had medication changes done by dr Kelly and feels better overall.    2018 - HAs been doing fine until last few days then she had some waking episodes.  This AM had some increased chest tightness and has used rescue inhaler more.  Has had some dysphagia and saw Dr Marroquin and was started on diflucan.  Feels better with regards with this.  Has not noted wheezing but does feel tight.    12/3/2018 - Here for follow up to have left knee replacement at [facility] this Friday, breathing is ok, has some cough and mucus.  After last vii she developed laryngitis which has yet to clear (? If related to her Symbicort) - she is to see Dr Peterson tomorrow AM.    Preoperative Pulmonary Clearance    Pt was seen for preoperative clearance from a pulmonary standpoint for planned left polly replacement.  The risks of the procedure have been discussed with the patient including the risk of prolonged ventilatory support/difficulty weaning from ventilator, post-procedure pneumonia, post-procedure respiratory failure and DVT/pulmonary embolism.  The pt is currently stable from their respiratory status and they are cleared for the planned procedure at increased risk.  The risk should not be considered prohibitive.  If you have any questions please contact me.  This clearance is pending evaluation by ENT for persistent hoarseness.    2019 - Here for follow up, doing better overall.  Has seen ENT " "and was found to have a vocal cord nodule, is being treated for reflux.  Still with significant vocal issues.  Mucus has been better.  Feels that asthma control is better and cough is much better than where it was.    5/28/2019 - Here for follow up.  Asthma control has better.  Hoarseness has been better - she has found that having a small cocktail daily had helped with her symptoms.  She tells me that her vocal cord nodule is "better".  Cough is much better. No issues with her medications but does have some concerns that her vocal issues may be related to her inhalers.      Asthma Flowsheet    Asthma -  Moderate  Persistent       Controlled    ACT - 21    Baseline PFT - FEV1- 95 %    Serum eosinophil count - 300  Serum IgE - < 2    Allergy testing - na   Desensitization - no    Therapy: ICS/LABA/LAMA +      PRN albuterol +                 Singulair                  Xolair                  Nucala                  Cinqair       Past Medical History    Past Medical History:   Diagnosis Date    Acid reflux     Allergic sinusitis     Asthma     Hoarseness     PONV (postoperative nausea and vomiting)        Past Surgical History    Past Surgical History:   Procedure Laterality Date    ADENOIDECTOMY      ARTHROPLASTY, KNEE, TOTAL Left 12/7/2018    [facility]    cesarian section      ctr      ELBOW SURGERY      and hardware removal    HYSTERECTOMY      INCONTINENCE SURGERY      INJECTION-JOINT CARPAL TUNNEL Left 6/5/2015    [facility] OR    KNEE ARTHROSCOPY      rectocele      RELEASE-CARPAL TUNNEL Right 6/5/2015    [facility] OR    TONSILLECTOMY         Medications      Current Outpatient Medications:     albuterol 90 mcg/actuation inhaler, Inhale 2 puffs into the lungs every 4 to 6 hours as needed for Wheezing. Rescue, Disp: 18 g, Rfl: 6    azelastine (ASTELIN) 137 mcg (0.1 %) nasal spray, 1 " spray per nare twice per day, may use up to 4 times daily 1 spray per nare for congestion and post nasal drip, Disp: 90 mL, Rfl: 4    budesonide-formoterol 160-4.5 mcg (SYMBICORT) 160-4.5 mcg/actuation HFAA, Inhale 2 puffs into the lungs every 12 (twelve) hours. Controller, Disp: 1 Inhaler, Rfl: 11    diphenhydrAMINE (BENADRYL) 25 mg capsule, Take 25 mg by mouth every 6 (six) hours as needed for Itching., Disp: , Rfl:     inhalat. spacing dev,sm. mask (AEROCHAMBER PLUS FLOW-VU,S MSK) Spcr, 1 Device by Misc.(Non-Drug; Combo Route) route as needed., Disp: 1 each, Rfl: 3    pantoprazole (PROTONIX) 40 MG tablet, Take 40 mg by mouth 2 (two) times daily., Disp: , Rfl:     ranitidine (ZANTAC) 150 MG tablet, Take 1 tablet (150 mg total) by mouth 2 (two) times daily., Disp: 60 tablet, Rfl: 3    sucralfate (CARAFATE) 1 gram tablet, Take 1 tablet (1 g total) by mouth 4 (four) times daily before meals and nightly., Disp: 120 tablet, Rfl: 3    valACYclovir (VALTREX) 1000 MG tablet, Take 1,000 mg by mouth once daily., Disp: , Rfl: 3    Allergies    Review of patient's allergies indicates:   Allergen Reactions    Codeine Nausea And Vomiting    Stadol [butorphanol tartrate] Shortness Of Breath     Pt reports stops breathing    Wiley-dur Shortness Of Breath     Stops breathing    Morphine Nausea And Vomiting     Severe.      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   HENT: Positive for congestion. Negative for nosebleeds.         + hoarseness   Eyes: Negative for pain.   Respiratory: Positive for cough. Negative for hemoptysis, sputum production, shortness of breath, wheezing and stridor.    Cardiovascular: Negative for chest pain, palpitations, orthopnea, claudication, leg swelling and PND.   Gastrointestinal: Positive for heartburn. Negative for abdominal pain, blood in stool, constipation, diarrhea, melena, nausea and vomiting.   Genitourinary: Negative for dysuria, flank pain, frequency, hematuria and urgency.   Musculoskeletal: Negative for falls and myalgias.   Skin: Negative for itching and rash.   Neurological: Negative for sensory change, focal weakness and weakness.   Psychiatric/Behavioral: Negative for depression. The patient is not nervous/anxious.        Physical Exam    Vitals:    05/28/19 1038   BP: 120/68   Pulse: 66   SpO2: 97%   Weight: 94.8 kg (209 lb)   Height: 5' 5" (1.651 m)       Physical Exam   Constitutional: She is oriented to person, place, and time. She appears well-developed and well-nourished. No distress.   HENT:   Head: Normocephalic and atraumatic.   Right Ear: External ear normal.   Left Ear: External ear normal.   Nose: Nose normal.   Mouth/Throat: Oropharynx is clear and moist.   + minimal post nasal drip   Eyes: Pupils are equal, round, and reactive to light. Conjunctivae and EOM are normal.   Neck: Normal range of motion. Neck supple. No JVD present. No tracheal deviation present. No thyromegaly present.   Cardiovascular: Normal rate, regular rhythm, normal heart sounds and intact distal pulses. Exam reveals no gallop and no friction rub.   No murmur heard.  Pulmonary/Chest: Effort normal and breath sounds normal. No stridor. No respiratory distress. She has no wheezes. She has no rales. She exhibits no tenderness.   Abdominal: Soft. Bowel sounds are normal. She exhibits no distension. There is no tenderness. "   Musculoskeletal: Normal range of motion. She exhibits no edema or tenderness.   Lymphadenopathy:     She has no cervical adenopathy.   Neurological: She is alert and oriented to person, place, and time. No cranial nerve deficit.   Skin: Skin is warm and dry. She is not diaphoretic.   Psychiatric: She has a normal mood and affect. Her behavior is normal.   Nursing note and vitals reviewed.      Labs    Lab Results   Component Value Date    WBC 10.28 12/09/2018    HGB 11.1 (L) 12/09/2018    HCT 35.0 (L) 12/09/2018     12/09/2018       Sodium   Date Value Ref Range Status   12/09/2018 138 136 - 145 mmol/L Final     Potassium   Date Value Ref Range Status   12/09/2018 4.2 3.5 - 5.1 mmol/L Final     Chloride   Date Value Ref Range Status   12/09/2018 104 95 - 110 mmol/L Final     CO2   Date Value Ref Range Status   12/09/2018 27 23 - 29 mmol/L Final     Glucose   Date Value Ref Range Status   12/09/2018 114 (H) 70 - 110 mg/dL Final     BUN, Bld   Date Value Ref Range Status   12/09/2018 6 6 - 20 mg/dL Final     Creatinine   Date Value Ref Range Status   12/09/2018 0.7 0.5 - 1.4 mg/dL Final   07/29/2013 0.7 0.5 - 1.4 mg/dL Final     Calcium   Date Value Ref Range Status   12/09/2018 8.9 8.7 - 10.5 mg/dL Final   07/29/2013 9.1 8.7 - 10.5 mg/dL Final     Total Protein   Date Value Ref Range Status   07/29/2013 6.7 6.0 - 8.4 g/dL Final     ALBUMIN   Date Value Ref Range Status   07/29/2013 3.4 (L) 3.5 - 5.2 g/dL Final     Alkaline Phosphatase   Date Value Ref Range Status   07/29/2013 79 55 - 135 U/L Final     AST   Date Value Ref Range Status   07/29/2013 17 10 - 40 U/L Final     ALT   Date Value Ref Range Status   07/29/2013 18 10 - 44 U/L Final     Anion Gap   Date Value Ref Range Status   12/09/2018 7 (L) 8 - 16 mmol/L Final   07/29/2013 13 5 - 15 meq/L Final       Xrays        Impression/Plan    Problem List Items Addressed This Visit        ENT    Chronic rhinitis  - continue present treatments  - seems better        Pulmonary    Chronic cough  - multifactorial  - better       GI    Laryngopharyngeal reflux (LPR)  - being treated      Other Visit Diagnoses     Asthma, unspecified asthma severity, unspecified whether complicated, unspecified whether persistent  - better and at baseline  - RTC 6 month    Vitamin D deficiency  - being replaced    Laryngitis  - better    Allergies  - sees Dr Robin Corbin MD

## 2019-07-24 ENCOUNTER — TELEPHONE (OUTPATIENT)
Dept: PULMONOLOGY | Facility: CLINIC | Age: 59
End: 2019-07-24

## 2019-07-24 NOTE — TELEPHONE ENCOUNTER
----- Message from Corey Castellano sent at 7/24/2019 10:55 AM CDT -----  Contact: Self   The patient called stating she was in the [facility] ER in July 6, 2019 and had taken labs, Xrays, and CT Scan.  She would like for Dr. Corbin to have those reports.  She also states she still has been coughing and having pain in her back.  She would like to speak with you if possible. Thanks in advance for your assistance.

## 2019-07-25 ENCOUNTER — OFFICE VISIT (OUTPATIENT)
Dept: PULMONOLOGY | Facility: CLINIC | Age: 59
End: 2019-07-25
Payer: COMMERCIAL

## 2019-07-25 VITALS
DIASTOLIC BLOOD PRESSURE: 70 MMHG | WEIGHT: 218 LBS | SYSTOLIC BLOOD PRESSURE: 108 MMHG | OXYGEN SATURATION: 97 % | BODY MASS INDEX: 36.32 KG/M2 | HEART RATE: 88 BPM | HEIGHT: 65 IN

## 2019-07-25 DIAGNOSIS — K21.9 LARYNGOPHARYNGEAL REFLUX (LPR): ICD-10-CM

## 2019-07-25 DIAGNOSIS — J45.40 MODERATE PERSISTENT ASTHMA, UNSPECIFIED WHETHER COMPLICATED: Primary | ICD-10-CM

## 2019-07-25 DIAGNOSIS — R06.89 DYSPNEA AND RESPIRATORY ABNORMALITIES: ICD-10-CM

## 2019-07-25 DIAGNOSIS — R05.3 CHRONIC COUGH: ICD-10-CM

## 2019-07-25 DIAGNOSIS — R06.00 DYSPNEA AND RESPIRATORY ABNORMALITIES: ICD-10-CM

## 2019-07-25 PROCEDURE — 3008F PR BODY MASS INDEX (BMI) DOCUMENTED: ICD-10-PCS | Mod: ,,, | Performed by: INTERNAL MEDICINE

## 2019-07-25 PROCEDURE — 99214 PR OFFICE/OUTPT VISIT, EST, LEVL IV, 30-39 MIN: ICD-10-PCS | Mod: ,,, | Performed by: INTERNAL MEDICINE

## 2019-07-25 PROCEDURE — 99214 OFFICE O/P EST MOD 30 MIN: CPT | Mod: ,,, | Performed by: INTERNAL MEDICINE

## 2019-07-25 PROCEDURE — 3008F BODY MASS INDEX DOCD: CPT | Mod: ,,, | Performed by: INTERNAL MEDICINE

## 2019-07-25 RX ORDER — PREDNISONE 10 MG/1
TABLET ORAL
Qty: 30 TABLET | Refills: 0 | Status: SHIPPED | OUTPATIENT
Start: 2019-07-25 | End: 2020-01-17 | Stop reason: ALTCHOICE

## 2019-07-25 NOTE — LETTER
[facility] - Pulmonology  [address]  [address]  [place] [address]  Phone: [phone]     Dear Dr Hart:    I saw Ms Valdez Salazar (1960) today and was wondering if you have ever found any evidence of nasal polyps.  Thanks for letting me know.    Sincerely,      Thee Corbin MD  July 25, 2019

## 2019-07-25 NOTE — PROGRESS NOTES
"Office Visit    Patient Name: Valdez Salazar  MRN: [id number]  : 1960      Reason for visit: Asthma    HPI:     2018 - Pt with h/o asthma ( has seen Dr Bill in past), has trouble with dyspnea.  Has methacholine challenge test which was "positive".  Was on singulair and BREO, still had difficulties and "thick mucus".  Has trouble when reclining.  Has seen GI to evaluate for reflux ( had esophageal "stretched").  Was referred to Dr Kelly for evaluation and now here to establish care.  Hutchins also seen Dr Hart for ENT evaluation.  Had medication changes done by dr Kelly and feels better overall.    2018 - HAs been doing fine until last few days then she had some waking episodes.  This AM had some increased chest tightness and has used rescue inhaler more.  Has had some dysphagia and saw Dr Marroquin and was started on diflucan.  Feels better with regards with this.  Has not noted wheezing but does feel tight.    12/3/2018 - Here for follow up to have left knee replacement at [facility], [place], operated by [facility] this Friday, breathing is ok, has some cough and mucus.  After last vii she developed laryngitis which has yet to clear (? If related to her Symbicort) - she is to see Dr Peterson tomorrow AM.    Preoperative Pulmonary Clearance    Pt was seen for preoperative clearance from a pulmonary standpoint for planned left polly replacement.  The risks of the procedure have been discussed with the patient including the risk of prolonged ventilatory support/difficulty weaning from ventilator, post-procedure pneumonia, post-procedure respiratory failure and DVT/pulmonary embolism.  The pt is currently stable from their respiratory status and they are cleared for the planned procedure at increased risk.  The risk should not be considered prohibitive.  If you have any questions please contact me.  This clearance is pending evaluation by ENT for persistent hoarseness.    2019 - Here for follow up, doing better overall.  Has seen ENT " "and was found to have a vocal cord nodule, is being treated for reflux.  Still with significant vocal issues.  Mucus has been better.  Feels that asthma control is better and cough is much better than where it was.    5/28/2019 - Here for follow up.  Asthma control has better.  Hoarseness has been better - she has found that having a small cocktail daily had helped with her symptoms.  She tells me that her vocal cord nodule is "better".  Cough is much better. No issues with her medications but does have some concerns that her vocal issues may be related to her inhalers.    7/25/2019 - Was in ER 7/6 with pleurisy (ER note reviewed) - had CTA - negative but had finding of gallstones.   Continues with URI symptoms, post nasal drip.  Has some right arm discomfort (some soreness to right deltoid muscle).  Has some mucus at times which is clear.  Has not noted reflux symptoms (still taking meds).  Asthma control has been OK overall.  Gave her Dr Comer's       Asthma Flowsheet    Asthma -  Moderate  Persistent       Controlled    ACT - 21    Baseline PFT - FEV1- 95 %    Serum eosinophil count - 300  Serum IgE - < 2    Allergy testing - na   Desensitization - no    Therapy: ICS/LABA/LAMA +      PRN albuterol +                 Singulair                  Xolair                  Nucala                  Cinqair       Past Medical History    Past Medical History:   Diagnosis Date    Acid reflux     Allergic sinusitis     Asthma     Hoarseness     PONV (postoperative nausea and vomiting)        Past Surgical History    Past Surgical History:   Procedure Laterality Date    ADENOIDECTOMY      ARTHROPLASTY, KNEE, TOTAL Left 12/7/2018    [facility] OR    cesarian section      ctr      ELBOW SURGERY      and hardware removal    HYSTERECTOMY      INCONTINENCE SURGERY      INJECTION-JOINT CARPAL TUNNEL Left 6/5/2015    [facility] OR    KNEE " ARTHROSCOPY      rectocele      RELEASE-CARPAL TUNNEL Right 6/5/2015    [facility]    TONSILLECTOMY         Medications      Current Outpatient Medications:     albuterol 90 mcg/actuation inhaler, Inhale 2 puffs into the lungs every 4 to 6 hours as needed for Wheezing. Rescue, Disp: 18 g, Rfl: 6    azelastine (ASTELIN) 137 mcg (0.1 %) nasal spray, 1 spray per nare twice per day, may use up to 4 times daily 1 spray per nare for congestion and post nasal drip, Disp: 90 mL, Rfl: 4    budesonide-formoterol 160-4.5 mcg (SYMBICORT) 160-4.5 mcg/actuation HFAA, Inhale 2 puffs into the lungs every 12 (twelve) hours. Controller, Disp: 1 Inhaler, Rfl: 11    diphenhydrAMINE (BENADRYL) 25 mg capsule, Take 25 mg by mouth every 6 (six) hours as needed for Itching., Disp: , Rfl:     inhalat. spacing dev,sm. mask (AEROCHAMBER PLUS FLOW-VU,S MSK) Spcr, 1 Device by Misc.(Non-Drug; Combo Route) route as needed., Disp: 1 each, Rfl: 3    pantoprazole (PROTONIX) 40 MG tablet, Take 40 mg by mouth 2 (two) times daily., Disp: , Rfl:     ranitidine (ZANTAC) 150 MG tablet, Take 1 tablet (150 mg total) by mouth 2 (two) times daily., Disp: 60 tablet, Rfl: 3    sucralfate (CARAFATE) 1 gram tablet, Take 1 tablet (1 g total) by mouth 4 (four) times daily before meals and nightly., Disp: 120 tablet, Rfl: 3    valACYclovir (VALTREX) 1000 MG tablet, Take 1,000 mg by mouth once daily., Disp: , Rfl: 3    Allergies    Review of patient's allergies indicates:   Allergen Reactions    Codeine Nausea And Vomiting    Stadol [butorphanol tartrate] Shortness Of Breath     Pt reports stops breathing    Wiley-dur Shortness Of Breath     Stops breathing    Morphine Nausea And Vomiting     Severe.      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   HENT: Positive for congestion. Negative for nosebleeds.         + hoarseness   Eyes: Negative for pain.   Respiratory: Positive for cough. Negative for hemoptysis, sputum production, shortness of breath, wheezing and stridor.    Cardiovascular: Negative for chest pain, palpitations, orthopnea, claudication, leg swelling and PND.   Gastrointestinal: Positive for heartburn. Negative for abdominal pain, blood in stool, constipation, diarrhea, melena, nausea and vomiting.   Genitourinary: Negative for dysuria, flank pain, frequency, hematuria and urgency.   Musculoskeletal: Negative for falls and myalgias.   Skin: Negative for itching and rash.   Neurological: Negative for sensory change, focal weakness and weakness.   Psychiatric/Behavioral: Negative for depression. The patient is not nervous/anxious.        Physical Exam    Vitals:    07/25/19 0904   BP: 108/70   Pulse: 88   SpO2: 97%   Weight: 98.9 kg (218 lb)   Height: 5' 5" (1.651 m)       Physical Exam   Constitutional: She is oriented to person, place, and time. She appears well-developed and well-nourished. No distress.   HENT:   Head: Normocephalic and atraumatic.   Right Ear: External ear normal.   Left Ear: External ear normal.   Nose: Nose normal.   Mouth/Throat: Oropharynx is clear and moist.   + minimal post nasal drip   Eyes: Pupils are equal, round, and reactive to light. Conjunctivae and EOM are normal.   Neck: Normal range of motion. Neck supple. No JVD present. No tracheal deviation present. No thyromegaly present.   Cardiovascular: Normal " rate, regular rhythm, normal heart sounds and intact distal pulses. Exam reveals no gallop and no friction rub.   No murmur heard.  Pulmonary/Chest: Effort normal and breath sounds normal. No stridor. No respiratory distress. She has no wheezes. She has no rales. She exhibits no tenderness.   Abdominal: Soft. Bowel sounds are normal. She exhibits no distension. There is no tenderness.   Musculoskeletal: Normal range of motion. She exhibits no edema or tenderness.   Lymphadenopathy:     She has no cervical adenopathy.   Neurological: She is alert and oriented to person, place, and time. No cranial nerve deficit.   Skin: Skin is warm and dry. She is not diaphoretic.   Psychiatric: She has a normal mood and affect. Her behavior is normal.   Nursing note and vitals reviewed.      Labs    Lab Results   Component Value Date    WBC 10.28 12/09/2018    HGB 11.1 (L) 12/09/2018    HCT 35.0 (L) 12/09/2018     12/09/2018       Sodium   Date Value Ref Range Status   12/09/2018 138 136 - 145 mmol/L Final     Potassium   Date Value Ref Range Status   12/09/2018 4.2 3.5 - 5.1 mmol/L Final     Chloride   Date Value Ref Range Status   12/09/2018 104 95 - 110 mmol/L Final     CO2   Date Value Ref Range Status   12/09/2018 27 23 - 29 mmol/L Final     Glucose   Date Value Ref Range Status   12/09/2018 114 (H) 70 - 110 mg/dL Final     BUN, Bld   Date Value Ref Range Status   12/09/2018 6 6 - 20 mg/dL Final     Creatinine   Date Value Ref Range Status   12/09/2018 0.7 0.5 - 1.4 mg/dL Final   07/29/2013 0.7 0.5 - 1.4 mg/dL Final     Calcium   Date Value Ref Range Status   12/09/2018 8.9 8.7 - 10.5 mg/dL Final   07/29/2013 9.1 8.7 - 10.5 mg/dL Final     Total Protein   Date Value Ref Range Status   07/29/2013 6.7 6.0 - 8.4 g/dL Final     ALBUMIN   Date Value Ref Range Status   07/29/2013 3.4 (L) 3.5 - 5.2 g/dL Final     Alkaline Phosphatase   Date Value Ref Range Status   07/29/2013 79 55 - 135 U/L Final     AST   Date Value Ref  Range Status   07/29/2013 17 10 - 40 U/L Final     ALT   Date Value Ref Range Status   07/29/2013 18 10 - 44 U/L Final     Anion Gap   Date Value Ref Range Status   12/09/2018 7 (L) 8 - 16 mmol/L Final   07/29/2013 13 5 - 15 meq/L Final       Xrays        Impression/Plan    Problem List Items Addressed This Visit        ENT    Chronic rhinitis  - continue present treatments  - seems better       Pulmonary    Chronic cough  - multifactorial  - better       GI    Laryngopharyngeal reflux (LPR)  - being treated      Other Visit Diagnoses     Asthma, unspecified asthma severity, unspecified whether complicated, unspecified whether persistent  - stable and at baseline  - RTC 6 month    Vitamin D deficiency  - being replaced    Laryngitis  - better    Allergies  - sees Dr Robin Corbin MD

## 2019-08-29 DIAGNOSIS — K21.9 LARYNGOPHARYNGEAL REFLUX (LPR): ICD-10-CM

## 2019-08-29 RX ORDER — SUCRALFATE 1 G/1
TABLET ORAL
Qty: 360 TABLET | Refills: 0 | Status: SHIPPED | OUTPATIENT
Start: 2019-08-29 | End: 2019-12-01 | Stop reason: SDUPTHER

## 2019-10-06 RX ORDER — BUDESONIDE AND FORMOTEROL FUMARATE DIHYDRATE 160; 4.5 UG/1; UG/1
AEROSOL RESPIRATORY (INHALATION)
Qty: 30.6 INHALER | Refills: 6 | Status: SHIPPED | OUTPATIENT
Start: 2019-10-06 | End: 2020-10-18

## 2019-10-16 ENCOUNTER — OFFICE VISIT (OUTPATIENT)
Dept: SURGERY | Facility: CLINIC | Age: 59
End: 2019-10-16
Payer: COMMERCIAL

## 2019-10-16 VITALS
HEART RATE: 63 BPM | SYSTOLIC BLOOD PRESSURE: 127 MMHG | TEMPERATURE: 98 F | HEIGHT: 65 IN | BODY MASS INDEX: 36.32 KG/M2 | DIASTOLIC BLOOD PRESSURE: 74 MMHG | WEIGHT: 218 LBS

## 2019-10-16 DIAGNOSIS — K80.10 CALCULUS OF GALLBLADDER WITH CHRONIC CHOLECYSTITIS WITHOUT OBSTRUCTION: Primary | ICD-10-CM

## 2019-10-16 DIAGNOSIS — R49.0 HOARSE VOICE QUALITY: ICD-10-CM

## 2019-10-16 PROCEDURE — 99203 OFFICE O/P NEW LOW 30 MIN: CPT | Mod: S$GLB,,, | Performed by: SURGERY

## 2019-10-16 PROCEDURE — 99203 PR OFFICE/OUTPT VISIT, NEW, LEVL III, 30-44 MIN: ICD-10-PCS | Mod: S$GLB,,, | Performed by: SURGERY

## 2019-10-16 RX ORDER — SODIUM CHLORIDE 9 MG/ML
INJECTION, SOLUTION INTRAVENOUS CONTINUOUS
Status: CANCELLED | OUTPATIENT
Start: 2019-10-16

## 2019-10-16 RX ORDER — LIDOCAINE HYDROCHLORIDE 10 MG/ML
1 INJECTION, SOLUTION EPIDURAL; INFILTRATION; INTRACAUDAL; PERINEURAL ONCE
Status: DISCONTINUED | OUTPATIENT
Start: 2019-10-16 | End: 2019-10-25 | Stop reason: HOSPADM

## 2019-10-16 NOTE — H&P (VIEW-ONLY)
Subjective:       Patient ID: Valdez Salazar is a 59 y.o. female.    Chief Complaint: Consult (Referred by Dr. Corbin to eval gallbladder )      HPI:  Patient is 59 year old female referred to the office after findings of multiple gallstones on imaging. She has chronic severe GERD with Laryngopharyngeal reflux and associated hoarseness and cord nodules. She had CTA chest in July and was found to have multiple gallstones incidentally. She has a history of occasional right sided abdominal pain and nausea. Further imaging with US again showed multiple gallstones. She reports no pain with meals. She is concerned about her hoarseness. She has family history of thyroid cancer. She states her aunt presented with hoarseness before being diagnosed with thyroid cancer.     Past Medical History:   Diagnosis Date    Acid reflux     Allergic sinusitis     Asthma     Hoarseness     PONV (postoperative nausea and vomiting)      Past Surgical History:   Procedure Laterality Date    ADENOIDECTOMY      CARPAL TUNNEL RELEASE Right      SECTION      ELBOW SURGERY Left     and hardware removal    HYSTERECTOMY      REPAIR OF RECTOCELE      TONSILLECTOMY      TOTAL KNEE ARTHROPLASTY Left 2018    Procedure: ARTHROPLASTY, KNEE, TOTAL;  Surgeon: Claude S. Williams IV, MD;  Location: [facility];  Service: Orthopedics;  Laterality: Left;     Review of patient's allergies indicates:   Allergen Reactions    Codeine Nausea And Vomiting    Stadol [butorphanol tartrate] Shortness Of Breath     Pt reports stops breathing    Wiley-dur Shortness Of Breath     Stops breathing    Morphine Nausea And Vomiting     Severe.      Pt states can take demerol     Medication List with Changes/Refills   Current Medications    ALBUTEROL 90 MCG/ACTUATION INHALER    Inhale 2 puffs into the lungs every 4 to 6 hours as needed for Wheezing. Rescue    AZELASTINE (ASTELIN) 137 MCG (0.1 %) NASAL SPRAY    1 spray per nare twice per day,  may use up to 4 times daily 1 spray per nare for congestion and post nasal drip    DIPHENHYDRAMINE (BENADRYL) 25 MG CAPSULE    Take 25 mg by mouth every 6 (six) hours as needed for Itching.    INHALAT. SPACING DEV,SM. MASK (AEROCHAMBER PLUS FLOW-VU,S MSK) SPCR    1 Device by Misc.(Non-Drug; Combo Route) route as needed.    PANTOPRAZOLE (PROTONIX) 40 MG TABLET    Take 40 mg by mouth 2 (two) times daily.    PREDNISONE (DELTASONE) 10 MG TABLET    Take 3 a day x 5 days then 2 a day x 5 days then 1 a day x 5 days    RANITIDINE (ZANTAC) 150 MG TABLET    Take 1 tablet (150 mg total) by mouth 2 (two) times daily.    SUCRALFATE (CARAFATE) 1 GRAM TABLET    TAKE 1 TABLET BY MOUTH 4 TIMES A DAY BEFORE MEALS AND NIGHTLY    SYMBICORT 160-4.5 MCG/ACTUATION HFAA    INHALE 2 PUFFS INTO THE LUNGS EVERY 12 (TWELVE) HOURS.    VALACYCLOVIR (VALTREX) 1000 MG TABLET    Take 1,000 mg by mouth once daily.     Family History   Problem Relation Age of Onset    Allergies Mother     Emphysema Mother     Hypertension Mother     Prostate cancer Father     Alzheimer's disease Father     Kidney disease Father     Heart disease Father     Asthma Sister     Asthma Brother      Social History     Socioeconomic History    Marital status:      Spouse name: Not on file    Number of children: Not on file    Years of education: Not on file    Highest education level: Not on file   Occupational History    Not on file   Social Needs    Financial resource strain: Not on file    Food insecurity:     Worry: Not on file     Inability: Not on file    Transportation needs:     Medical: Not on file     Non-medical: Not on file   Tobacco Use    Smoking status: Never Smoker    Smokeless tobacco: Never Used   Substance and Sexual Activity    Alcohol use: Yes     Comment: Occasionally     Drug use: No    Sexual activity: Not on file   Lifestyle    Physical activity:     Days per week: Not on file     Minutes per session: Not on file     Stress: Not on file   Relationships    Social connections:     Talks on phone: Not on file     Gets together: Not on file     Attends [employer] service: Not on file     Active member of club or organization: Not on file     Attends meetings of clubs or organizations: Not on file     Relationship status: Not on file   Other Topics Concern    Not on file   Social History Narrative    Not on file         Review of Systems   Constitutional: Negative for appetite change, chills, fever and unexpected weight change.   HENT: Negative for hearing loss, rhinorrhea, sore throat and voice change.    Eyes: Negative for photophobia and visual disturbance.   Respiratory: Negative for cough, choking and shortness of breath.    Cardiovascular: Negative for chest pain, palpitations and leg swelling.   Gastrointestinal: Negative for abdominal pain, blood in stool, constipation, diarrhea, nausea and vomiting.   Endocrine: Negative for cold intolerance, heat intolerance, polydipsia and polyuria.   Musculoskeletal: Negative for arthralgias, back pain, joint swelling and neck stiffness.   Skin: Negative for color change, pallor and rash.   Neurological: Negative for dizziness, seizures, syncope and headaches.   Hematological: Negative for adenopathy. Does not bruise/bleed easily.   Psychiatric/Behavioral: Negative for agitation, behavioral problems and confusion.       Objective:      Physical Exam   Constitutional: She appears well-developed and well-nourished.  Non-toxic appearance. No distress.   HENT:   Head: Normocephalic and atraumatic. Head is without abrasion and without laceration.   Right Ear: External ear normal.   Left Ear: External ear normal.   Nose: Nose normal.   Mouth/Throat: Oropharynx is clear and moist.   Eyes: Pupils are equal, round, and reactive to light. EOM are normal.   Neck: Neck supple. Tracheal tenderness present. No tracheal deviation and normal range of motion present. No thyroid mass and no thyromegaly  present.   Voice is hoarse.  She has some mild anterior neck tenderness  I do not appreciate any thyromegaly or nodules.    Cardiovascular: Normal rate and regular rhythm.   Pulmonary/Chest: Effort normal. No accessory muscle usage. No tachypnea. No respiratory distress.   Abdominal: Soft. Normal appearance and bowel sounds are normal. She exhibits no distension and no mass. There is no hepatosplenomegaly. There is no tenderness. There is no rigidity, no rebound, no guarding and negative Wiley's sign. No hernia.   Lymphadenopathy:        Right: No inguinal adenopathy present.        Left: No inguinal adenopathy present.   Neurological: She is alert. Coordination and gait normal.   Skin: Skin is warm and intact.   Psychiatric: She has a normal mood and affect. Her speech is normal and behavior is normal.       Assessment/Plan:   Valdez was seen today for consult.    Diagnoses and all orders for this visit:    Calculus of gallbladder with chronic cholecystitis without obstruction  -     Vital signs; Standing  -     Insert peripheral IV; Standing  -     Diet NPO; Standing  -     Pulse Oximetry Q4H; Standing  -     Case Request Operating Room: CHOLECYSTECTOMY, LAPAROSCOPIC  -     Full code; Standing  -     Place in Outpatient; Standing  -     Place sequential compression device; Standing  -     EKG 12-lead; Future  -     X-Ray Chest 1 View; Future  -     Comprehensive metabolic panel; Future  -     CBC auto differential; Future  -     Insert peripheral IV  -     Full code    Hoarse voice quality  -     US Soft Tissue Head Neck Thyroid; Future  -     Thyroid Panel With TSH; Future  -     Thyroid Panel With TSH    Other orders  -     lidocaine (PF) 10 mg/ml (1%) injection 10 mg  -     0.9%  NaCl infusion  -     IP VTE LOW RISK PATIENT; Standing  -     ceFOXItin (MEFOXIN) 2 g in dextrose 5 % 100 mL IVPB      Patient has multiple gallstones on imaging. She has frequent right sided abdominal pain. Will plan for  cholecystectomy in two weeks.   She also has a chronically hoarse voice from reflux disease.  She has been evaluated and found to have evidence of air way irritation from reflux. She is concerned about her family history of thyroid cancer because her aunt was diagnosed with late stage thyroid cancer after presenting with hoarseness. Will order thyroid US       Impression/Treatment Plan: sudheer thompson       I discussed the proposed procedures the patient including risks, benefits, indications, alternatives and special concerns.  The patient appears to understand and agrees to go ahead with surgery.  I have made no promises, warranties or verbal agreements beyond what was discussed above.

## 2019-10-16 NOTE — LETTER
October 17, 2019      Thee Corbin MD  [id number]  [facility] [place] [address]           [facility]  [address]  [place] [address]  Phone: [phone]  Fax: [phone]          Patient: Valdez Salazar   MR Number: [id number]   YOB: 1960   Date of Visit: 10/16/2019       Dear Dr. Thee Corbin:    Thank you for referring Valdez Salazar to me for evaluation. Attached you will find relevant portions of my assessment and plan of care.    If you have questions, please do not hesitate to call me. I look forward to following Valdez Salazar along with you.    Sincerely,    Douglas Quiroz III, MD    Enclosure  CC:  No Recipients    If you would like to receive this communication electronically, please contact [email or web address] or [phone] to request more information on [employer] Link access.    For providers and/or their staff who would like to refer a patient to [facility], please contact us through our one-stop-shop provider referral line, [facility], at [phone].    If you feel you have received this communication in error or would no longer like to receive these types of communications, please e-mail [email or web address]

## 2019-10-16 NOTE — PROGRESS NOTES
Subjective:       Patient ID: Valdez Salazar is a 59 y.o. female.    Chief Complaint: Consult (Referred by Dr. Corbin to eval gallbladder )      HPI:  Patient is 59 year old female referred to the office after findings of multiple gallstones on imaging. She has chronic severe GERD with Laryngopharyngeal reflux and associated hoarseness and cord nodules. She had CTA chest in July and was found to have multiple gallstones incidentally. She has a history of occasional right sided abdominal pain and nausea. Further imaging with US again showed multiple gallstones. She reports no pain with meals. She is concerned about her hoarseness. She has family history of thyroid cancer. She states her aunt presented with hoarseness before being diagnosed with thyroid cancer.     Past Medical History:   Diagnosis Date    Acid reflux     Allergic sinusitis     Asthma     Hoarseness     PONV (postoperative nausea and vomiting)      Past Surgical History:   Procedure Laterality Date    ADENOIDECTOMY      CARPAL TUNNEL RELEASE Right      SECTION      ELBOW SURGERY Left     and hardware removal    HYSTERECTOMY      REPAIR OF RECTOCELE      TONSILLECTOMY      TOTAL KNEE ARTHROPLASTY Left 2018    Procedure: ARTHROPLASTY, KNEE, TOTAL;  Surgeon: Claude S. Williams IV, MD;  Location: [facility];  Service: Orthopedics;  Laterality: Left;     Review of patient's allergies indicates:   Allergen Reactions    Codeine Nausea And Vomiting    Stadol [butorphanol tartrate] Shortness Of Breath     Pt reports stops breathing    Wiley-dur Shortness Of Breath     Stops breathing    Morphine Nausea And Vomiting     Severe.      Pt states can take demerol     Medication List with Changes/Refills   Current Medications    ALBUTEROL 90 MCG/ACTUATION INHALER    Inhale 2 puffs into the lungs every 4 to 6 hours as needed for Wheezing. Rescue    AZELASTINE (ASTELIN) 137 MCG (0.1 %) NASAL SPRAY    1 spray per nare twice per day,  may use up to 4 times daily 1 spray per nare for congestion and post nasal drip    DIPHENHYDRAMINE (BENADRYL) 25 MG CAPSULE    Take 25 mg by mouth every 6 (six) hours as needed for Itching.    INHALAT. SPACING DEV,SM. MASK (AEROCHAMBER PLUS FLOW-VU,S MSK) SPCR    1 Device by Misc.(Non-Drug; Combo Route) route as needed.    PANTOPRAZOLE (PROTONIX) 40 MG TABLET    Take 40 mg by mouth 2 (two) times daily.    PREDNISONE (DELTASONE) 10 MG TABLET    Take 3 a day x 5 days then 2 a day x 5 days then 1 a day x 5 days    RANITIDINE (ZANTAC) 150 MG TABLET    Take 1 tablet (150 mg total) by mouth 2 (two) times daily.    SUCRALFATE (CARAFATE) 1 GRAM TABLET    TAKE 1 TABLET BY MOUTH 4 TIMES A DAY BEFORE MEALS AND NIGHTLY    SYMBICORT 160-4.5 MCG/ACTUATION HFAA    INHALE 2 PUFFS INTO THE LUNGS EVERY 12 (TWELVE) HOURS.    VALACYCLOVIR (VALTREX) 1000 MG TABLET    Take 1,000 mg by mouth once daily.     Family History   Problem Relation Age of Onset    Allergies Mother     Emphysema Mother     Hypertension Mother     Prostate cancer Father     Alzheimer's disease Father     Kidney disease Father     Heart disease Father     Asthma Sister     Asthma Brother      Social History     Socioeconomic History    Marital status:      Spouse name: Not on file    Number of children: Not on file    Years of education: Not on file    Highest education level: Not on file   Occupational History    Not on file   Social Needs    Financial resource strain: Not on file    Food insecurity:     Worry: Not on file     Inability: Not on file    Transportation needs:     Medical: Not on file     Non-medical: Not on file   Tobacco Use    Smoking status: Never Smoker    Smokeless tobacco: Never Used   Substance and Sexual Activity    Alcohol use: Yes     Comment: Occasionally     Drug use: No    Sexual activity: Not on file   Lifestyle    Physical activity:     Days per week: Not on file     Minutes per session: Not on file     Stress: Not on file   Relationships    Social connections:     Talks on phone: Not on file     Gets together: Not on file     Attends [employer] service: Not on file     Active member of club or organization: Not on file     Attends meetings of clubs or organizations: Not on file     Relationship status: Not on file   Other Topics Concern    Not on file   Social History Narrative    Not on file         Review of Systems   Constitutional: Negative for appetite change, chills, fever and unexpected weight change.   HENT: Negative for hearing loss, rhinorrhea, sore throat and voice change.    Eyes: Negative for photophobia and visual disturbance.   Respiratory: Negative for cough, choking and shortness of breath.    Cardiovascular: Negative for chest pain, palpitations and leg swelling.   Gastrointestinal: Negative for abdominal pain, blood in stool, constipation, diarrhea, nausea and vomiting.   Endocrine: Negative for cold intolerance, heat intolerance, polydipsia and polyuria.   Musculoskeletal: Negative for arthralgias, back pain, joint swelling and neck stiffness.   Skin: Negative for color change, pallor and rash.   Neurological: Negative for dizziness, seizures, syncope and headaches.   Hematological: Negative for adenopathy. Does not bruise/bleed easily.   Psychiatric/Behavioral: Negative for agitation, behavioral problems and confusion.       Objective:      Physical Exam   Constitutional: She appears well-developed and well-nourished.  Non-toxic appearance. No distress.   HENT:   Head: Normocephalic and atraumatic. Head is without abrasion and without laceration.   Right Ear: External ear normal.   Left Ear: External ear normal.   Nose: Nose normal.   Mouth/Throat: Oropharynx is clear and moist.   Eyes: Pupils are equal, round, and reactive to light. EOM are normal.   Neck: Neck supple. Tracheal tenderness present. No tracheal deviation and normal range of motion present. No thyroid mass and no thyromegaly  present.   Voice is hoarse.  She has some mild anterior neck tenderness  I do not appreciate any thyromegaly or nodules.    Cardiovascular: Normal rate and regular rhythm.   Pulmonary/Chest: Effort normal. No accessory muscle usage. No tachypnea. No respiratory distress.   Abdominal: Soft. Normal appearance and bowel sounds are normal. She exhibits no distension and no mass. There is no hepatosplenomegaly. There is no tenderness. There is no rigidity, no rebound, no guarding and negative Wiley's sign. No hernia.   Lymphadenopathy:        Right: No inguinal adenopathy present.        Left: No inguinal adenopathy present.   Neurological: She is alert. Coordination and gait normal.   Skin: Skin is warm and intact.   Psychiatric: She has a normal mood and affect. Her speech is normal and behavior is normal.       Assessment/Plan:   Valdez was seen today for consult.    Diagnoses and all orders for this visit:    Calculus of gallbladder with chronic cholecystitis without obstruction  -     Vital signs; Standing  -     Insert peripheral IV; Standing  -     Diet NPO; Standing  -     Pulse Oximetry Q4H; Standing  -     Case Request Operating Room: CHOLECYSTECTOMY, LAPAROSCOPIC  -     Full code; Standing  -     Place in Outpatient; Standing  -     Place sequential compression device; Standing  -     EKG 12-lead; Future  -     X-Ray Chest 1 View; Future  -     Comprehensive metabolic panel; Future  -     CBC auto differential; Future  -     Insert peripheral IV  -     Full code    Hoarse voice quality  -     US Soft Tissue Head Neck Thyroid; Future  -     Thyroid Panel With TSH; Future  -     Thyroid Panel With TSH    Other orders  -     lidocaine (PF) 10 mg/ml (1%) injection 10 mg  -     0.9%  NaCl infusion  -     IP VTE LOW RISK PATIENT; Standing  -     ceFOXItin (MEFOXIN) 2 g in dextrose 5 % 100 mL IVPB      Patient has multiple gallstones on imaging. She has frequent right sided abdominal pain. Will plan for  cholecystectomy in two weeks.   She also has a chronically hoarse voice from reflux disease.  She has been evaluated and found to have evidence of air way irritation from reflux. She is concerned about her family history of thyroid cancer because her aunt was diagnosed with late stage thyroid cancer after presenting with hoarseness. Will order thyroid US       Impression/Treatment Plan: sudheer thompson       I discussed the proposed procedures the patient including risks, benefits, indications, alternatives and special concerns.  The patient appears to understand and agrees to go ahead with surgery.  I have made no promises, warranties or verbal agreements beyond what was discussed above.       Keystone Flap Text: The defect edges were debeveled with a #15 scalpel blade.  Given the location of the defect, shape of the defect a keystone flap was deemed most appropriate.  Using a sterile surgical marker, an appropriate keystone flap was drawn incorporating the defect, outlining the appropriate donor tissue and placing the expected incisions within the relaxed skin tension lines where possible. The area thus outlined was incised deep to adipose tissue with a #15 scalpel blade.  The skin margins were undermined to an appropriate distance in all directions around the primary defect and laterally outward around the flap utilizing iris scissors.

## 2019-10-21 ENCOUNTER — LAB VISIT (OUTPATIENT)
Dept: LAB | Facility: HOSPITAL | Age: 59
End: 2019-10-21
Attending: SURGERY
Payer: COMMERCIAL

## 2019-10-21 ENCOUNTER — HOSPITAL ENCOUNTER (OUTPATIENT)
Dept: RADIOLOGY | Facility: HOSPITAL | Age: 59
Discharge: HOME | End: 2019-10-21
Attending: SURGERY
Payer: COMMERCIAL

## 2019-10-21 ENCOUNTER — HOSPITAL ENCOUNTER (OUTPATIENT)
Dept: PREADMISSION TESTING | Facility: HOSPITAL | Age: 59
Discharge: HOME | End: 2019-10-21
Attending: SURGERY
Payer: COMMERCIAL

## 2019-10-21 VITALS
DIASTOLIC BLOOD PRESSURE: 65 MMHG | HEIGHT: 65 IN | RESPIRATION RATE: 16 BRPM | HEART RATE: 67 BPM | OXYGEN SATURATION: 95 % | TEMPERATURE: 98 F | BODY MASS INDEX: 38.07 KG/M2 | WEIGHT: 228.5 LBS | SYSTOLIC BLOOD PRESSURE: 119 MMHG

## 2019-10-21 DIAGNOSIS — R49.0 HOARSE VOICE QUALITY: ICD-10-CM

## 2019-10-21 DIAGNOSIS — R49.8 NEUROLOGIC VOICE DISORDER: Primary | ICD-10-CM

## 2019-10-21 DIAGNOSIS — K80.10 CALCULUS OF GALLBLADDER WITH CHRONIC CHOLECYSTITIS WITHOUT OBSTRUCTION: ICD-10-CM

## 2019-10-21 LAB
ALBUMIN SERPL BCP-MCNC: 4.4 G/DL (ref 3.5–5.2)
ALP SERPL-CCNC: 60 U/L (ref 55–135)
ALT SERPL W/O P-5'-P-CCNC: 19 U/L (ref 10–44)
ANION GAP SERPL CALC-SCNC: 9 MMOL/L (ref 8–16)
AST SERPL-CCNC: 17 U/L (ref 10–40)
BASOPHILS # BLD AUTO: 0.07 K/UL (ref 0–0.2)
BASOPHILS NFR BLD: 1.1 % (ref 0–1.9)
BILIRUB SERPL-MCNC: 0.6 MG/DL (ref 0.1–1)
BUN SERPL-MCNC: 17 MG/DL (ref 8–23)
CALCIUM SERPL-MCNC: 9.5 MG/DL (ref 8.7–10.5)
CHLORIDE SERPL-SCNC: 105 MMOL/L (ref 95–110)
CO2 SERPL-SCNC: 27 MMOL/L (ref 23–29)
CREAT SERPL-MCNC: 0.6 MG/DL (ref 0.5–1.4)
DIFFERENTIAL METHOD: ABNORMAL
EOSINOPHIL # BLD AUTO: 0.2 K/UL (ref 0–0.5)
EOSINOPHIL NFR BLD: 3.5 % (ref 0–8)
ERYTHROCYTE [DISTWIDTH] IN BLOOD BY AUTOMATED COUNT: 12.9 % (ref 11.5–14.5)
EST. GFR  (AFRICAN AMERICAN): >60 ML/MIN/1.73 M^2
EST. GFR  (NON AFRICAN AMERICAN): >60 ML/MIN/1.73 M^2
GLUCOSE SERPL-MCNC: 94 MG/DL (ref 70–110)
HCT VFR BLD AUTO: 43 % (ref 37–48.5)
HGB BLD-MCNC: 13.7 G/DL (ref 12–16)
IMM GRANULOCYTES # BLD AUTO: 0.03 K/UL (ref 0–0.04)
IMM GRANULOCYTES NFR BLD AUTO: 0.5 % (ref 0–0.5)
LYMPHOCYTES # BLD AUTO: 2.5 K/UL (ref 1–4.8)
LYMPHOCYTES NFR BLD: 38.1 % (ref 18–48)
MCH RBC QN AUTO: 28.6 PG (ref 27–31)
MCHC RBC AUTO-ENTMCNC: 31.9 G/DL (ref 32–36)
MCV RBC AUTO: 90 FL (ref 82–98)
MONOCYTES # BLD AUTO: 0.8 K/UL (ref 0.3–1)
MONOCYTES NFR BLD: 12.3 % (ref 4–15)
NEUTROPHILS # BLD AUTO: 3 K/UL (ref 1.8–7.7)
NEUTROPHILS NFR BLD: 44.5 % (ref 38–73)
NRBC BLD-RTO: 0 /100 WBC
PLATELET # BLD AUTO: 215 K/UL (ref 150–350)
PMV BLD AUTO: 10.9 FL (ref 9.2–12.9)
POTASSIUM SERPL-SCNC: 4.1 MMOL/L (ref 3.5–5.1)
PROT SERPL-MCNC: 7.1 G/DL (ref 6–8.4)
RBC # BLD AUTO: 4.79 M/UL (ref 4–5.4)
SODIUM SERPL-SCNC: 141 MMOL/L (ref 136–145)
TSH SERPL DL<=0.005 MIU/L-ACNC: 1.14 UIU/ML (ref 0.34–5.6)
WBC # BLD AUTO: 6.61 K/UL (ref 3.9–12.7)

## 2019-10-21 PROCEDURE — 71046 X-RAY EXAM CHEST 2 VIEWS: CPT | Mod: TC

## 2019-10-21 PROCEDURE — 93005 ELECTROCARDIOGRAM TRACING: CPT

## 2019-10-21 PROCEDURE — 76536 US EXAM OF HEAD AND NECK: CPT | Mod: TC

## 2019-10-21 PROCEDURE — 80053 COMPREHEN METABOLIC PANEL: CPT

## 2019-10-21 PROCEDURE — 84443 ASSAY THYROID STIM HORMONE: CPT

## 2019-10-21 PROCEDURE — 85025 COMPLETE CBC W/AUTO DIFF WBC: CPT

## 2019-10-21 NOTE — DISCHARGE INSTRUCTIONS
Incision Care  Remember: Follow-up visits allow your healthcare provider to make sure your incision is healing well. Be sure to keep your appointments.     Stitches (sutures), surgical staples, special strips of surgical tape, or surgical skin glue may be used to close incisions. They also help stop bleeding and speed healing. To help your incision heal, follow the tips on this handout.  Home care  Tips for home care include the following:  · Always wash your hands before touching your incision.  · Keep your incision clean and dry.  · Avoid doing things that could cause dirt or sweat to get on your incision.  · Dont pick at scabs. They help protect the wound.  · Keep your incision out of water.  · Take a sponge bath to avoid getting your incision wet, unless your healthcare provider tells you otherwise.  · Ask your provider when can you take a shower or bathe.  · Ask your provider about the best way to keep your incision dry when bathing or showering.  · Pat stitches dry if they get wet. Dont rub.  · Leave the bandage (dressing) in place until you are told to remove it or change it. Change it only as directed, using clean hands.  · After the first 12 hours, change your dressing every 24 hours, or as directed by your healthcare provider.  · Change your dressing if it gets wet or soiled.  Care for specific closures  Follow these guidelines unless your healthcare provider tells you otherwise:  · Stitches or staples. Once you no longer need to keep these dry, clean the wound daily. First remove the bandage using clean hands. Then wash the area gently with soap and warm water. Use a wet cotton swab to loosen and remove any blood or crust that forms. After cleaning, put a thin layer of antibiotic ointment on. Then put on a new bandage.  · Skin glue. Dont put liquid, ointment, or cream on your wound while the glue is in place. Avoid activities that cause heavy sweating. Protect the wound from sunlight. Do not scratch,  rub, or pick at the glue. Do not put tape directly over the glue. The glue should peel off within 5 to 10 days.  · Surgical tape. Keep the area dry. If it gets wet, blot the area dry with a clean towel. Surgical tape usually falls off within 7 to 10 days. If it has not fallen off after 10 days, contact your healthcare provider before taking it off yourself. If you are told to remove the tape, put mineral oil or petroleum jelly on a cotton ball. Gently rub the tape until it is removed.  Changing your dressing  Leave the dressing (bandage) in place until you are told to remove it or change it. Follow the instructions below unless told otherwise by your healthcare provider:  · Always wash your hands before changing your dressing.  · After the first 48 hours, the incision wound usually will have closed. At this point, leave the incision uncovered and open to the air. If the incision has not closed keep it covered.  · Cover your incision only if your clothing is rubbing it or causing irritation.  · Change your dressing if it gets wet or soiled.  Follow-up care  Follow up with your healthcare provider to ask how long sutures or staples should be left in place. Be sure to return for stitch or staple removal as directed. If dissolving stitches were used in your mouth, these will not need to be removed. They should fall out or dissolve on their own.  If tape closures were used, remove them yourself when your provider recommends if they have not fallen off on their own. If skin glue was used, the glue will wear off by itself.  When to seek medical care  Call your healthcare provider if you have any of the following:  · More pain, redness, swelling, bleeding, or foul-smelling discharge around the incision area  · Fever of 100.4°F (38ºC) or higher, or as directed by your healthcare provider  · Shaking chills  · Vomiting or nausea that doesn't go away  · Numbness, coldness, or tingling around the incision area, or changes in  skin color  · Opening of the sutures or wound  · Stitches or staples come apart or fall out or surgical tape falls off before 7 days or as directed by your healthcare provider   Date Last Reviewed: 12/1/2016  © 1481-9123 [employer]. [address] [place] [address], [place] [address]. All rights reserved. This information is not intended as a substitute for professional medical care. Always follow your healthcare professional's instructions.

## 2019-10-23 ENCOUNTER — TELEPHONE (OUTPATIENT)
Dept: SURGERY | Facility: CLINIC | Age: 59
End: 2019-10-23

## 2019-10-23 DIAGNOSIS — E04.1 LEFT THYROID NODULE: Primary | ICD-10-CM

## 2019-10-23 DIAGNOSIS — R49.0 HOARSE VOICE QUALITY: ICD-10-CM

## 2019-10-23 NOTE — TELEPHONE ENCOUNTER
Pt calling stating she received thyroid u/s results through [facility] & it showed 14mm solid nodule in left thyroid gland. I showed results to Dr. Quiroz & he would like pt to get FNA. Pt has fear of needles so will probably require some type of anti anxiety medication to take morning of that procedure. Pt will call & let me know when it is scheduled so I can get with Dr. Quiroz about calling a rx in.

## 2019-10-24 ENCOUNTER — ANESTHESIA EVENT (OUTPATIENT)
Dept: SURGERY | Facility: HOSPITAL | Age: 59
End: 2019-10-24
Payer: COMMERCIAL

## 2019-10-24 NOTE — PLAN OF CARE
Dr. Nieves notified of EKG interpretation. Reviewed history and EKG and previous EKG faxed to him.  Ok to proceed.

## 2019-10-25 ENCOUNTER — ANESTHESIA (OUTPATIENT)
Dept: SURGERY | Facility: HOSPITAL | Age: 59
End: 2019-10-25
Payer: COMMERCIAL

## 2019-10-25 ENCOUNTER — HOSPITAL ENCOUNTER (OUTPATIENT)
Facility: HOSPITAL | Age: 59
Discharge: HOME | End: 2019-10-25
Attending: SURGERY | Admitting: SURGERY
Payer: COMMERCIAL

## 2019-10-25 VITALS
RESPIRATION RATE: 17 BRPM | OXYGEN SATURATION: 96 % | BODY MASS INDEX: 37.99 KG/M2 | TEMPERATURE: 98 F | SYSTOLIC BLOOD PRESSURE: 130 MMHG | HEIGHT: 65 IN | WEIGHT: 228 LBS | DIASTOLIC BLOOD PRESSURE: 68 MMHG | HEART RATE: 68 BPM

## 2019-10-25 DIAGNOSIS — K80.10 CALCULUS OF GALLBLADDER WITH CHRONIC CHOLECYSTITIS WITHOUT OBSTRUCTION: Primary | ICD-10-CM

## 2019-10-25 PROCEDURE — 47562 LAPAROSCOPIC CHOLECYSTECTOMY: CPT | Mod: ,,, | Performed by: SURGERY

## 2019-10-25 PROCEDURE — C9290 INJ, BUPIVACAINE LIPOSOME: HCPCS | Performed by: SURGERY

## 2019-10-25 PROCEDURE — 63600175 PHARM REV CODE 636 W HCPCS: Performed by: ANESTHESIOLOGY

## 2019-10-25 PROCEDURE — 27000673 HC TUBING BLOOD Y: Performed by: NURSE ANESTHETIST, CERTIFIED REGISTERED

## 2019-10-25 PROCEDURE — 71000015 HC POSTOP RECOV 1ST HR: Performed by: SURGERY

## 2019-10-25 PROCEDURE — 63600175 PHARM REV CODE 636 W HCPCS: Performed by: NURSE ANESTHETIST, CERTIFIED REGISTERED

## 2019-10-25 PROCEDURE — 25000003 PHARM REV CODE 250: Performed by: ANESTHESIOLOGY

## 2019-10-25 PROCEDURE — 37000009 HC ANESTHESIA EA ADD 15 MINS: Performed by: SURGERY

## 2019-10-25 PROCEDURE — 37000008 HC ANESTHESIA 1ST 15 MINUTES: Performed by: SURGERY

## 2019-10-25 PROCEDURE — 27000650 HC AIRWAY EXCHANGE: Performed by: NURSE ANESTHETIST, CERTIFIED REGISTERED

## 2019-10-25 PROCEDURE — 63600175 PHARM REV CODE 636 W HCPCS: Performed by: SURGERY

## 2019-10-25 PROCEDURE — 27000671 HC TUBING MICROBORE EXT: Performed by: NURSE ANESTHETIST, CERTIFIED REGISTERED

## 2019-10-25 PROCEDURE — 27201107 HC STYLET, STANDARD: Performed by: NURSE ANESTHETIST, CERTIFIED REGISTERED

## 2019-10-25 PROCEDURE — 27000080 OPTIME MED/SURG SUP & DEVICES GENERAL CLASSIFICATION: Performed by: SURGERY

## 2019-10-25 PROCEDURE — 27000284 HC CANNULA NASAL: Performed by: NURSE ANESTHETIST, CERTIFIED REGISTERED

## 2019-10-25 PROCEDURE — 27202105 HC BIS BILATERAL SENSOR: Performed by: NURSE ANESTHETIST, CERTIFIED REGISTERED

## 2019-10-25 PROCEDURE — 47562 PR LAP,CHOLECYSTECTOMY: ICD-10-PCS | Mod: ,,, | Performed by: SURGERY

## 2019-10-25 PROCEDURE — 71000039 HC RECOVERY, EACH ADD'L HOUR: Performed by: SURGERY

## 2019-10-25 PROCEDURE — 71000016 HC POSTOP RECOV ADDL HR: Performed by: SURGERY

## 2019-10-25 PROCEDURE — 27201423 OPTIME MED/SURG SUP & DEVICES STERILE SUPPLY: Performed by: SURGERY

## 2019-10-25 PROCEDURE — 36000708 HC OR TIME LEV III 1ST 15 MIN: Performed by: SURGERY

## 2019-10-25 PROCEDURE — 27000653 HC CATH, IV CATHLN: Performed by: NURSE ANESTHETIST, CERTIFIED REGISTERED

## 2019-10-25 PROCEDURE — S0028 INJECTION, FAMOTIDINE, 20 MG: HCPCS | Performed by: NURSE ANESTHETIST, CERTIFIED REGISTERED

## 2019-10-25 PROCEDURE — 25000003 PHARM REV CODE 250: Performed by: SURGERY

## 2019-10-25 PROCEDURE — 25000003 PHARM REV CODE 250: Performed by: NURSE ANESTHETIST, CERTIFIED REGISTERED

## 2019-10-25 PROCEDURE — 71000033 HC RECOVERY, INTIAL HOUR: Performed by: SURGERY

## 2019-10-25 PROCEDURE — 36000709 HC OR TIME LEV III EA ADD 15 MIN: Performed by: SURGERY

## 2019-10-25 RX ORDER — FAMOTIDINE 10 MG/ML
INJECTION INTRAVENOUS
Status: DISCONTINUED | OUTPATIENT
Start: 2019-10-25 | End: 2019-10-25

## 2019-10-25 RX ORDER — DEXAMETHASONE SODIUM PHOSPHATE 4 MG/ML
INJECTION, SOLUTION INTRA-ARTICULAR; INTRALESIONAL; INTRAMUSCULAR; INTRAVENOUS; SOFT TISSUE
Status: DISCONTINUED | OUTPATIENT
Start: 2019-10-25 | End: 2019-10-25

## 2019-10-25 RX ORDER — LIDOCAINE HYDROCHLORIDE 20 MG/ML
INJECTION, SOLUTION EPIDURAL; INFILTRATION; INTRACAUDAL; PERINEURAL
Status: DISCONTINUED | OUTPATIENT
Start: 2019-10-25 | End: 2019-10-25

## 2019-10-25 RX ORDER — ACETAMINOPHEN 500 MG
1000 TABLET ORAL
Status: COMPLETED | OUTPATIENT
Start: 2019-10-25 | End: 2019-10-25

## 2019-10-25 RX ORDER — ROCURONIUM BROMIDE 10 MG/ML
INJECTION, SOLUTION INTRAVENOUS
Status: DISCONTINUED | OUTPATIENT
Start: 2019-10-25 | End: 2019-10-25

## 2019-10-25 RX ORDER — FENTANYL CITRATE 50 UG/ML
INJECTION, SOLUTION INTRAMUSCULAR; INTRAVENOUS
Status: DISCONTINUED | OUTPATIENT
Start: 2019-10-25 | End: 2019-10-25

## 2019-10-25 RX ORDER — DIAZEPAM 5 MG/1
5 TABLET ORAL EVERY 6 HOURS PRN
Status: DISCONTINUED | OUTPATIENT
Start: 2019-10-25 | End: 2019-10-25 | Stop reason: HOSPADM

## 2019-10-25 RX ORDER — SCOLOPAMINE TRANSDERMAL SYSTEM 1 MG/1
1 PATCH, EXTENDED RELEASE TRANSDERMAL
Status: DISCONTINUED | OUTPATIENT
Start: 2019-10-25 | End: 2019-10-25 | Stop reason: HOSPADM

## 2019-10-25 RX ORDER — SODIUM CHLORIDE, SODIUM LACTATE, POTASSIUM CHLORIDE, CALCIUM CHLORIDE 600; 310; 30; 20 MG/100ML; MG/100ML; MG/100ML; MG/100ML
INJECTION, SOLUTION INTRAVENOUS CONTINUOUS PRN
Status: DISCONTINUED | OUTPATIENT
Start: 2019-10-25 | End: 2019-10-25

## 2019-10-25 RX ORDER — TRAMADOL HYDROCHLORIDE 50 MG/1
50 TABLET ORAL ONCE AS NEEDED
Status: COMPLETED | OUTPATIENT
Start: 2019-10-25 | End: 2019-10-25

## 2019-10-25 RX ORDER — PROPOFOL 10 MG/ML
VIAL (ML) INTRAVENOUS CONTINUOUS PRN
Status: DISCONTINUED | OUTPATIENT
Start: 2019-10-25 | End: 2019-10-25

## 2019-10-25 RX ORDER — MEPERIDINE HYDROCHLORIDE 50 MG/ML
12.5 INJECTION INTRAMUSCULAR; INTRAVENOUS; SUBCUTANEOUS EVERY 10 MIN PRN
Status: COMPLETED | OUTPATIENT
Start: 2019-10-25 | End: 2019-10-25

## 2019-10-25 RX ORDER — PROPOFOL 10 MG/ML
VIAL (ML) INTRAVENOUS
Status: DISCONTINUED | OUTPATIENT
Start: 2019-10-25 | End: 2019-10-25

## 2019-10-25 RX ORDER — DIPHENHYDRAMINE HCL 25 MG
25 CAPSULE ORAL EVERY 6 HOURS PRN
Status: DISCONTINUED | OUTPATIENT
Start: 2019-10-25 | End: 2019-10-25 | Stop reason: HOSPADM

## 2019-10-25 RX ORDER — CEFOXITIN SODIUM 2 G/50ML
INJECTION, SOLUTION INTRAVENOUS
Status: DISCONTINUED | OUTPATIENT
Start: 2019-10-25 | End: 2019-10-25

## 2019-10-25 RX ORDER — ONDANSETRON 2 MG/ML
4 INJECTION INTRAMUSCULAR; INTRAVENOUS ONCE
Status: COMPLETED | OUTPATIENT
Start: 2019-10-25 | End: 2019-10-25

## 2019-10-25 RX ORDER — SUCCINYLCHOLINE CHLORIDE 20 MG/ML
INJECTION INTRAMUSCULAR; INTRAVENOUS
Status: DISCONTINUED | OUTPATIENT
Start: 2019-10-25 | End: 2019-10-25

## 2019-10-25 RX ORDER — ONDANSETRON 2 MG/ML
4 INJECTION INTRAMUSCULAR; INTRAVENOUS DAILY PRN
Status: COMPLETED | OUTPATIENT
Start: 2019-10-25 | End: 2019-10-25

## 2019-10-25 RX ORDER — SODIUM CHLORIDE 0.9 % (FLUSH) 0.9 %
10 SYRINGE (ML) INJECTION
Status: DISCONTINUED | OUTPATIENT
Start: 2019-10-25 | End: 2019-10-25 | Stop reason: HOSPADM

## 2019-10-25 RX ORDER — BUPIVACAINE HYDROCHLORIDE AND EPINEPHRINE 5; 5 MG/ML; UG/ML
INJECTION, SOLUTION EPIDURAL; INTRACAUDAL; PERINEURAL
Status: DISCONTINUED | OUTPATIENT
Start: 2019-10-25 | End: 2019-10-25 | Stop reason: HOSPADM

## 2019-10-25 RX ORDER — MEPERIDINE HYDROCHLORIDE 50 MG/ML
6.25 INJECTION INTRAMUSCULAR; INTRAVENOUS; SUBCUTANEOUS EVERY 10 MIN PRN
Status: COMPLETED | OUTPATIENT
Start: 2019-10-25 | End: 2019-10-25

## 2019-10-25 RX ORDER — TRAMADOL HYDROCHLORIDE 50 MG/1
50 TABLET ORAL EVERY 4 HOURS PRN
Qty: 30 TABLET
Start: 2019-10-25 | End: 2023-04-06

## 2019-10-25 RX ORDER — DIAZEPAM 5 MG/ML
5 INJECTION, SOLUTION INTRAMUSCULAR; INTRAVENOUS EVERY 4 HOURS PRN
Status: DISCONTINUED | OUTPATIENT
Start: 2019-10-25 | End: 2019-10-25

## 2019-10-25 RX ORDER — SODIUM CHLORIDE 9 MG/ML
INJECTION, SOLUTION INTRAVENOUS CONTINUOUS
Status: DISCONTINUED | OUTPATIENT
Start: 2019-10-25 | End: 2019-10-25 | Stop reason: HOSPADM

## 2019-10-25 RX ORDER — DIPHENHYDRAMINE HYDROCHLORIDE 50 MG/ML
6.25 INJECTION INTRAMUSCULAR; INTRAVENOUS
Status: COMPLETED | OUTPATIENT
Start: 2019-10-25 | End: 2019-10-25

## 2019-10-25 RX ORDER — HYDROCODONE BITARTRATE AND ACETAMINOPHEN 7.5; 325 MG/1; MG/1
1 TABLET ORAL EVERY 4 HOURS PRN
Qty: 30 TABLET | Refills: 0
Start: 2019-10-25 | End: 2019-10-25 | Stop reason: SINTOL

## 2019-10-25 RX ADMIN — PROPOFOL 50 MG: 10 INJECTION, EMULSION INTRAVENOUS at 08:10

## 2019-10-25 RX ADMIN — PROPOFOL 50 MG: 10 INJECTION, EMULSION INTRAVENOUS at 07:10

## 2019-10-25 RX ADMIN — DIPHENHYDRAMINE HYDROCHLORIDE 25 MG: 25 CAPSULE ORAL at 06:10

## 2019-10-25 RX ADMIN — LIDOCAINE HYDROCHLORIDE 80 MG: 20 INJECTION, SOLUTION EPIDURAL; INFILTRATION; INTRACAUDAL; PERINEURAL at 07:10

## 2019-10-25 RX ADMIN — PROPOFOL 25 MG: 10 INJECTION, EMULSION INTRAVENOUS at 08:10

## 2019-10-25 RX ADMIN — TRAMADOL HYDROCHLORIDE 50 MG: 50 TABLET, FILM COATED ORAL at 09:10

## 2019-10-25 RX ADMIN — SCOPOLAMINE 1 PATCH: 1 PATCH TRANSDERMAL at 06:10

## 2019-10-25 RX ADMIN — FAMOTIDINE 20 MG: 10 INJECTION, SOLUTION INTRAVENOUS at 07:10

## 2019-10-25 RX ADMIN — MEPERIDINE HYDROCHLORIDE 6.25 MG: 50 INJECTION INTRAMUSCULAR; INTRAVENOUS; SUBCUTANEOUS at 10:10

## 2019-10-25 RX ADMIN — ONDANSETRON 4 MG: 2 INJECTION INTRAMUSCULAR; INTRAVENOUS at 09:10

## 2019-10-25 RX ADMIN — ROCURONIUM BROMIDE 25 MG: 10 INJECTION, SOLUTION INTRAVENOUS at 07:10

## 2019-10-25 RX ADMIN — ACETAMINOPHEN 1000 MG: 500 TABLET, FILM COATED ORAL at 06:10

## 2019-10-25 RX ADMIN — PROPOFOL 50 MCG/KG/MIN: 10 INJECTION, EMULSION INTRAVENOUS at 07:10

## 2019-10-25 RX ADMIN — ONDANSETRON 4 MG: 2 INJECTION INTRAMUSCULAR; INTRAVENOUS at 08:10

## 2019-10-25 RX ADMIN — ONDANSETRON 4 MG: 2 INJECTION INTRAMUSCULAR; INTRAVENOUS at 07:10

## 2019-10-25 RX ADMIN — DIAZEPAM 5 MG: 5 TABLET ORAL at 06:10

## 2019-10-25 RX ADMIN — SUCCINYLCHOLINE CHLORIDE 120 MG: 20 INJECTION, SOLUTION INTRAMUSCULAR; INTRAVENOUS at 07:10

## 2019-10-25 RX ADMIN — SODIUM CHLORIDE, SODIUM LACTATE, POTASSIUM CHLORIDE, AND CALCIUM CHLORIDE: .6; .31; .03; .02 INJECTION, SOLUTION INTRAVENOUS at 07:10

## 2019-10-25 RX ADMIN — FENTANYL CITRATE 50 MCG: 50 INJECTION INTRAMUSCULAR; INTRAVENOUS at 07:10

## 2019-10-25 RX ADMIN — MEPERIDINE HYDROCHLORIDE 12.5 MG: 50 INJECTION INTRAMUSCULAR; INTRAVENOUS; SUBCUTANEOUS at 09:10

## 2019-10-25 RX ADMIN — SODIUM CHLORIDE, SODIUM LACTATE, POTASSIUM CHLORIDE, AND CALCIUM CHLORIDE: .6; .31; .03; .02 INJECTION, SOLUTION INTRAVENOUS at 08:10

## 2019-10-25 RX ADMIN — DIPHENHYDRAMINE HYDROCHLORIDE 6.25 MG: 50 INJECTION, SOLUTION INTRAMUSCULAR; INTRAVENOUS at 10:10

## 2019-10-25 RX ADMIN — SUGAMMADEX 200 MG: 100 INJECTION, SOLUTION INTRAVENOUS at 08:10

## 2019-10-25 RX ADMIN — ROCURONIUM BROMIDE 5 MG: 10 INJECTION, SOLUTION INTRAVENOUS at 07:10

## 2019-10-25 RX ADMIN — PROPOFOL 120 MG: 10 INJECTION, EMULSION INTRAVENOUS at 07:10

## 2019-10-25 RX ADMIN — CEFOXITIN SODIUM 2 G: 2 INJECTION, SOLUTION INTRAVENOUS at 07:10

## 2019-10-25 RX ADMIN — DIPHENHYDRAMINE HYDROCHLORIDE 6.25 MG: 50 INJECTION, SOLUTION INTRAMUSCULAR; INTRAVENOUS at 09:10

## 2019-10-25 RX ADMIN — DEXAMETHASONE SODIUM PHOSPHATE 8 MG: 4 INJECTION, SOLUTION INTRAMUSCULAR; INTRAVENOUS at 07:10

## 2019-10-25 NOTE — PLAN OF CARE
Transported to [facility] via stretcher in stable condition.  Still with mild nausea and pain that are tolerable.  Abdominal pain described as cramping.  One dressing saturated and new Mepore applied.  Report given to BOLA Tobin

## 2019-10-25 NOTE — ANESTHESIA PREPROCEDURE EVALUATION
Patient Active Problem List   Diagnosis    Acute chest pain    Carpal tunnel syndrome    Dyspnea and respiratory abnormalities    Hoarseness    Chronic rhinitis    Chronic cough    Esophageal dysphagia    Laryngopharyngeal reflux (LPR)    Viral illness    Asthma    Vitamin D deficiency    Osteoarthritis of left knee    Voice hoarseness    Non-allergic rhinitis    Calculus of gallbladder with chronic cholecystitis without obstruction       Past Surgical History:   Procedure Laterality Date    ADENOIDECTOMY      CARPAL TUNNEL RELEASE Right 2014     SECTION      COLONOSCOPY      ELBOW SURGERY Left     and hardware removal    HYSTERECTOMY      JOINT REPLACEMENT  2018    REPAIR OF RECTOCELE      TONSILLECTOMY      TOTAL KNEE ARTHROPLASTY Left 2018    Procedure: ARTHROPLASTY, KNEE, TOTAL;  Surgeon: Claude S. Williams IV, MD;  Location: [facility];  Service: Orthopedics;  Laterality: Left;        Tobacco Use:  The patient  reports that she has never smoked. She has never used smokeless tobacco.     Results for orders placed or performed during the hospital encounter of 10/21/19   EKG 12-lead    Collection Time: 10/21/19  1:38 PM    Narrative    Test Reason : K80.10,    Vent. Rate : 065 BPM     Atrial Rate : 065 BPM     P-R Int : 170 ms          QRS Dur : 084 ms      QT Int : 424 ms       P-R-T Axes : 036 009 031 degrees     QTc Int : 440 ms    Normal sinus rhythm  Cannot rule out Inferior infarct ,age undetermined  Abnormal ECG  When compared with ECG of 2013 22:00,  No significant change was found  Confirmed by Golden ANDREW, Ankit NEVES ([id number]) on 10/24/2019 9:07:42 AM    Referred By:  JANETTE           Confirmed By:Ankit Franks MD             Lab Results   Component Value Date    WBC 6.61 10/21/2019    HGB 13.7 10/21/2019    HCT 43.0 10/21/2019    MCV 90 10/21/2019     10/21/2019     BMP  Lab Results   Component Value Date     10/21/2019    K 4.1 10/21/2019    CL  105 10/21/2019    CO2 27 10/21/2019    BUN 17 10/21/2019    CREATININE 0.6 10/21/2019    CALCIUM 9.5 10/21/2019    ANIONGAP 9 10/21/2019    ESTGFRAFRICA >60.0 10/21/2019    EGFRNONAA >60.0 10/21/2019                                                                                                                    10/25/2019  Valdez Salazar is a 59 y.o., female.    Anesthesia Evaluation    I have reviewed the Patient Summary Reports.    I have reviewed the Nursing Notes.   I have reviewed the Medications.     Review of Systems  Anesthesia Hx:  No problems with previous Anesthesia Hx of Anesthetic complications  History of prior surgery of interest to airway management or planning: Previous anesthesia: Spinal  12/2018 TKA with spinal.  Procedure performed at an [facility]. Denies Family Hx of Anesthesia complications.  Personal Hx of Anesthesia complications, Post-Operative Nausea/Vomiting, with every anesthetic, despite treatment   Social:  Social Alcohol Use, Non-Smoker    Hematology/Oncology:  Hematology Normal   Oncology Normal     EENT/Dental:  EENT/Dental Normal Denies Active Dental Problems   Jaw Problems:  Clicking (TMJ), Pt advised of risk for TMJ Occurrance and Jaw problems may cause Airway difficulty Patient reports occ. Jaw locking without the need for dentist or oral surgeon to realine   Cardiovascular:  Cardiovascular Normal  ECG has been reviewed.    Pulmonary:   Asthma mild Shortness of breath Last asthma attack 2 weeks ago   Rescue inhaler only ( last used 2 weeks ago )  No nebulizer use   Never hospitalized   No home O2   Renal/:  Renal/ Normal     Hepatic/GI:   GERD, well controlled    Musculoskeletal:   Arthritis     Neurological:   Neuromuscular Disease, Headaches    Endocrine:  Endocrine Normal    Dermatological:  Skin Normal    Psych:  Psychiatric Normal           Physical Exam  General:  Obesity    Airway/Jaw/Neck:  Airway Findings: Mouth Opening: Normal Tongue: Normal  General  Airway Assessment: Adult  Mallampati: III  Improves to III with phonation.  TM Distance: < 4 cm  Jaw/Neck Findings:  Neck ROM: Normal ROM      Dental:  Dental Findings: Molar caps   Chest/Lungs:  Chest/Lungs Findings: Clear to auscultation, Normal Respiratory Rate     Heart/Vascular:  Heart Findings: Rate: Normal  Rhythm: Regular Rhythm  Sounds: Normal        Mental Status:  Mental Status Findings:  Cooperative, Alert and Oriented         Anesthesia Plan  Type of Anesthesia, risks & benefits discussed:  Anesthesia Type:  general  Patient's Preference: General  Intra-op Monitoring Plan: standard ASA monitors  Intra-op Monitoring Plan Comments:   Post Op Pain Control Plan: multimodal analgesia, IV/PO Opioids PRN and per primary service following discharge from PACU  Post Op Pain Control Plan Comments:   Induction:   IV  Beta Blocker:  Patient is not currently on a Beta-Blocker (No further documentation required).       Informed Consent: Patient understands risks and agrees with Anesthesia plan.  Questions answered. Anesthesia consent signed with patient.  ASA Score: 3     Day of Surgery Review of History & Physical: I have interviewed and examined the patient. I have reviewed the patient's H&P dated:  There are no significant changes.      Anesthesia Plan Notes: GETA/LTA    Propofol Only ( No Vol. Anesthetic Agent )  Valium po and Benadryl po in SDS  Decadron 8mg, iv Zofran 4mg iv, Pepcid 20mg   Ofirmev 1000mg iv    Sugammadex         Ready For Surgery From Anesthesia Perspective.

## 2019-10-25 NOTE — TRANSFER OF CARE
"Anesthesia Transfer of Care Note    Patient: Valdez Salazar    Procedure(s) Performed: Procedure(s) (LRB):  CHOLECYSTECTOMY, LAPAROSCOPIC (N/A)    Patient location: PACU    Anesthesia Type: general    Transport from OR: Transported from OR on room air with adequate spontaneous ventilation    Post pain: adequate analgesia    Post assessment: no apparent anesthetic complications    Post vital signs: stable    Level of consciousness: awake and alert    Nausea/Vomiting: no nausea/vomiting    Complications: none    Transfer of care protocol was followed      Last vitals:   Visit Vitals  /70 (BP Location: Right arm, Patient Position: Sitting)   Pulse 70   Temp 36.8 °C (98.2 °F) (Oral)   Resp 14   Ht 5' 5" (1.651 m)   Wt 103.4 kg (228 lb)   SpO2 98%   Breastfeeding? No   BMI 37.94 kg/m²     "

## 2019-10-25 NOTE — PLAN OF CARE
Arrived to PACU s/p laparoscopic cholecystectomy.  Has 4 lap sites closed with benzoin, steri strips and mepore.  Has drainage noted to dressing at umbilicus.  Complaining of pain 8/10 and nausea.  Patient very anxious

## 2019-10-25 NOTE — DISCHARGE SUMMARY
Discharge Summary  General Surgery      Admit Date: 10/25/2019    Discharge Date :10/25/2019    Attending Physician: Douglas Quiroz III     Discharge Physician: Douglas Quiroz III    Discharged Condition: good    Discharge Diagnosis: Calculus of gallbladder with chronic cholecystitis without obstruction [K80.10]    Treatments/Procedures: Procedure(s) (LRB):  CHOLECYSTECTOMY, LAPAROSCOPIC (N/A)    Hospital Course: Uneventful; Discharged home from Recovery    Significant Diagnostic Studies: none    Disposition: Home or Self Care    Diet: Regular    Follow up: Office 10-14 days    Activity: No heavy lifting till seen in office.    Patient Instructions:   Discharge Medication List as of 10/25/2019 12:30 PM      START taking these medications    Details   traMADol (ULTRAM) 50 mg tablet Take 1 tablet (50 mg total) by mouth every 4 (four) hours as needed for Pain., Starting Fri 10/25/2019, No Print         CONTINUE these medications which have NOT CHANGED    Details   diphenhydrAMINE (BENADRYL) 25 mg capsule Take 25 mg by mouth every 6 (six) hours as needed for Itching., Historical Med      inhalat. spacing dev,sm. mask (AEROCHAMBER PLUS FLOW-VU,S MSK) Spcr 1 Device by Misc.(Non-Drug; Combo Route) route as needed., Starting Tue 10/16/2018, Normal      pantoprazole (PROTONIX) 40 MG tablet Take 40 mg by mouth 2 (two) times daily., Historical Med      sucralfate (CARAFATE) 1 gram tablet TAKE 1 TABLET BY MOUTH 4 TIMES A DAY BEFORE MEALS AND NIGHTLY, Normal      SYMBICORT 160-4.5 mcg/actuation HFAA INHALE 2 PUFFS INTO THE LUNGS EVERY 12 (TWELVE) HOURS., Normal      valACYclovir (VALTREX) 1000 MG tablet Take 1,000 mg by mouth daily as needed. , Starting Tue 8/21/2018, Historical Med      albuterol 90 mcg/actuation inhaler Inhale 2 puffs into the lungs every 4 to 6 hours as needed for Wheezing. Rescue, Starting Wed 9/12/2018, Until Wed 10/16/2019, Normal      azelastine (ASTELIN) 137 mcg (0.1 %) nasal spray 1 spray per nare  twice per day, may use up to 4 times daily 1 spray per nare for congestion and post nasal drip, Normal      predniSONE (DELTASONE) 10 MG tablet Take 3 a day x 5 days then 2 a day x 5 days then 1 a day x 5 days, Normal      ranitidine (ZANTAC) 150 MG tablet Take 1 tablet (150 mg total) by mouth 2 (two) times daily., Starting Tue 1/22/2019, Normal             Discharge Procedure Orders   Diet Adult Regular     Lifting restrictions   Order Comments: No lifting over twenty pounds for six weeks     Notify your health care provider if you experience any of the following:  temperature >100.4     Notify your health care provider if you experience any of the following:  persistent nausea and vomiting or diarrhea     Notify your health care provider if you experience any of the following:  redness, tenderness, or signs of infection (pain, swelling, redness, odor or green/yellow discharge around incision site)     Notify your health care provider if you experience any of the following:  increased confusion or weakness     Remove dressing in 48 hours     Shower on day dressing removed (No bath)

## 2019-10-25 NOTE — BRIEF OP NOTE
[facility]  Brief Operative Note    SUMMARY     Surgery Date: 10/25/2019     Surgeon(s) and Role:     * Douglas Quiroz III, MD - Primary    Assisting Surgeon: None    Pre-op Diagnosis:  Calculus of gallbladder with chronic cholecystitis without obstruction [K80.10]    Post-op Diagnosis:  Post-Op Diagnosis Codes:     * Calculus of gallbladder with chronic cholecystitis without obstruction [K80.10], umbilical hernia    Procedure(s) (LRB):  CHOLECYSTECTOMY, LAPAROSCOPIC (N/A) and primary repair of 1 cm umbilical hernia defect    Anesthesia: General    Description of Procedure:   The patient was brought to the operating room and transferred to the operating room table in the supine position.  Patient was given general anesthesia and intubated.  The abdomen was prepped and draped.  A transverse infraumbilical incision was made.  Dissection was carried down through the skin and subcutaneous tissue. She had a small umbilical hernia. The umbilical stalk was dissected out 360 degrees.  The stalk was amputated near the skin level.  This exposed the umbilical hernia.  It was a 1 cm defect.  There was only fat in the hernia sac.  THere was no bowel compromise.  The Tiffany was placed through the hernia defect and into the abdomen. The abdomen was insufflated.  Under direct visualization, three 5mm ports were placed.  One was placed in the subxiphoid position, one in the right anterior axillary line and the other in the mid clavicular line.  The gallbladder body was retracted superiorly and the infundibulum was retracted laterally.  The peritoneum overlying the cystic duct and artery was carefully taken down.  The cystic duct and cystic artery were individually isolated and dissected free 360 degrees.  They were individually clipped proximally and distally.  They were transected using endo laura.  Electrocautery was used to take the gallbladder off the liver bed.  The Endo-Catch bag was used to remove the  gallbladder from the abdomen.  We irrigated out the right upper quadrant with irrigation.  We checked again and there was no evidence of any bile leak or bleeding from our clips or from the liver bed.  The patient tolerated the procedure well.  We removed all of our ports.  We repaired the fascial defect with interrupted Ethibond sutures. The appearance of a belly button was recreated by suturing the umbilical skin to the underlying fascia.  The skin was then closed with 4-0 Monocryl. The other skin sites were also closed with 4-0 Monocryl.  After that was done, the patient was extubated and taken to the recovery room in stable condition.  All lap and instrument counts were correct.      Description of the findings of the procedure: 1 cm hernia defect    Estimated Blood Loss: 5 mL         Specimens:   Specimen (12h ago, onward)     Start     Ordered    10/25/19 0823  Specimen to Pathology - Surgery  Once     Comments:  Pre-op Diagnosis: Calculus of gallbladder with chronic cholecystitis without obstruction [K80.10]Post-op Diagnosis: calculus of gallbladder with chronic cholecystitis without obstructionProcedure(s):CHOLECYSTECTOMY, LAPAROSCOPIC Number of specimens: 1Name of specimens: gallbladder      10/25/19 0823

## 2019-10-25 NOTE — ANESTHESIA POSTPROCEDURE EVALUATION
Anesthesia Post Evaluation    Patient: Valdez Salazar    Procedure(s) Performed: Procedure(s) (LRB):  CHOLECYSTECTOMY, LAPAROSCOPIC (N/A)    Final Anesthesia Type: general  Patient location during evaluation: PACU  Patient participation: Yes- Able to Participate  Level of consciousness: awake and alert and oriented  Post-procedure vital signs: reviewed and stable  Pain management: adequate  Airway patency: patent  PONV status at discharge: nausea (controlled)  Anesthetic complications: no      Cardiovascular status: hemodynamically stable  Respiratory status: unassisted, room air and spontaneous ventilation  Hydration status: euvolemic  Follow-up not needed.      Patent without jaw pain or jaw dislocation    Vitals Value Taken Time   /64 10/25/2019 10:30 AM   Temp 36.4 °C (97.5 °F) 10/25/2019 10:15 AM   Pulse 67 10/25/2019 10:43 AM   Resp 19 10/25/2019 10:42 AM   SpO2 94 % 10/25/2019 10:43 AM   Vitals shown include unvalidated device data.      No case tracking events are documented in the log.      Pain/Natty Score: Pain Rating Prior to Med Admin: 4 (10/25/2019 10:25 AM)  Natty Score: 10 (10/25/2019 10:15 AM)

## 2019-10-25 NOTE — PLAN OF CARE
Pain 5/10.  Obtained order for additional medication. Also medicated with Benadryl 6.25mg for nausea

## 2019-10-25 NOTE — OP NOTE
Surgery Date: 10/25/2019     Surgeon(s) and Role:     * Douglas Quiroz III, MD - Primary    Assisting Surgeon: None    Pre-op Diagnosis:  Calculus of gallbladder with chronic cholecystitis without obstruction [K80.10]    Post-op Diagnosis:  Post-Op Diagnosis Codes:     * Calculus of gallbladder with chronic cholecystitis without obstruction [K80.10], umbilical hernia    Procedure(s) (LRB):  CHOLECYSTECTOMY, LAPAROSCOPIC (N/A) and primary repair of 1 cm umbilical hernia defect    Anesthesia: General    Description of Procedure:   The patient was brought to the operating room and transferred to the operating room table in the supine position.  Patient was given general anesthesia and intubated.  The abdomen was prepped and draped.  A transverse infraumbilical incision was made.  Dissection was carried down through the skin and subcutaneous tissue. She had a small umbilical hernia. The umbilical stalk was dissected out 360 degrees.  The stalk was amputated near the skin level.  This exposed the umbilical hernia.  It was a 1 cm defect.  There was only fat in the hernia sac.  THere was no bowel compromise.  The Tiffany was placed through the hernia defect and into the abdomen. The abdomen was insufflated.  Under direct visualization, three 5mm ports were placed.  One was placed in the subxiphoid position, one in the right anterior axillary line and the other in the mid clavicular line.  The gallbladder body was retracted superiorly and the infundibulum was retracted laterally.  The peritoneum overlying the cystic duct and artery was carefully taken down.  The cystic duct and cystic artery were individually isolated and dissected free 360 degrees.  They were individually clipped proximally and distally.  They were transected using endo laura.  Electrocautery was used to take the gallbladder off the liver bed.  The Endo-Catch bag was used to remove the gallbladder from the abdomen.  We irrigated out the right upper  quadrant with irrigation.  We checked again and there was no evidence of any bile leak or bleeding from our clips or from the liver bed.  The patient tolerated the procedure well.  We removed all of our ports.  We repaired the fascial defect with interrupted Ethibond sutures. The appearance of a belly button was recreated by suturing the umbilical skin to the underlying fascia.  The skin was then closed with 4-0 Monocryl. The other skin sites were also closed with 4-0 Monocryl.  After that was done, the patient was extubated and taken to the recovery room in stable condition.  All lap and instrument counts were correct.      Description of the findings of the procedure: 1 cm hernia defect    Estimated Blood Loss: 5 mL    Complications none    Instrument counts correct         Specimens:   Specimen (12h ago, onward)     Start     Ordered    10/25/19 0823  Specimen to Pathology - Surgery  Once     Comments:  Pre-op Diagnosis: Calculus of gallbladder with chronic cholecystitis without obstruction [K80.10]Post-op Diagnosis: calculus of gallbladder with chronic cholecystitis without obstructionProcedure(s):CHOLECYSTECTOMY, LAPAROSCOPIC Number of specimens: 1Name of specimens: gallbladder      10/25/19 0823

## 2019-10-26 DIAGNOSIS — R06.89 DYSPNEA AND RESPIRATORY ABNORMALITIES: ICD-10-CM

## 2019-10-26 DIAGNOSIS — R06.00 DYSPNEA AND RESPIRATORY ABNORMALITIES: ICD-10-CM

## 2019-10-28 RX ORDER — ALBUTEROL SULFATE 90 UG/1
2 AEROSOL, METERED RESPIRATORY (INHALATION)
Qty: 8.5 INHALER | Refills: 6 | Status: SHIPPED | OUTPATIENT
Start: 2019-10-28 | End: 2022-03-14

## 2019-11-07 ENCOUNTER — OFFICE VISIT (OUTPATIENT)
Dept: SURGERY | Facility: CLINIC | Age: 59
End: 2019-11-07
Payer: COMMERCIAL

## 2019-11-07 ENCOUNTER — TELEPHONE (OUTPATIENT)
Dept: RADIOLOGY | Facility: HOSPITAL | Age: 59
End: 2019-11-07

## 2019-11-07 VITALS
WEIGHT: 228 LBS | HEART RATE: 72 BPM | BODY MASS INDEX: 37.99 KG/M2 | TEMPERATURE: 98 F | SYSTOLIC BLOOD PRESSURE: 123 MMHG | HEIGHT: 65 IN | DIASTOLIC BLOOD PRESSURE: 78 MMHG

## 2019-11-07 DIAGNOSIS — E04.1 LEFT THYROID NODULE: ICD-10-CM

## 2019-11-07 DIAGNOSIS — K80.10 CALCULUS OF GALLBLADDER WITH CHRONIC CHOLECYSTITIS WITHOUT OBSTRUCTION: Primary | ICD-10-CM

## 2019-11-07 PROCEDURE — 99024 PR POST-OP FOLLOW-UP VISIT: ICD-10-PCS | Mod: S$GLB,,, | Performed by: SURGERY

## 2019-11-07 PROCEDURE — 99024 POSTOP FOLLOW-UP VISIT: CPT | Mod: S$GLB,,, | Performed by: SURGERY

## 2019-11-13 NOTE — PROGRESS NOTES
Subjective:       Patient ID: Valdez Salazar is a 59 y.o. female.    Chief Complaint: Post-op Evaluation (FU DOS 10/25/19 Lap Alexsandra & umbilical hernia repair )      HPI:  Patient is status post lap choly and umbilical hernia repair.  She reports drainage from the umbilical incision. She has been afebrile.  Pathology is benign.  Patient also has 14 mm thyroid nodule and scheduled for FNA in 2 weeks.    Review of Systems    Objective:      Physical Exam   Constitutional: She is oriented to person, place, and time. She is cooperative. No distress.   Pulmonary/Chest: Effort normal. No respiratory distress.   Abdominal: Soft. She exhibits no distension. There is no tenderness. There is no rebound and no guarding.       The umbilical incision is superficially dehisced at the right lateral margin.  There appears to be a lot of tension on the incision from the umbilical skin being sutured back down to the muscle.  There is some erythema surrounding the incision.  I cannot express any pus.   Neurological: She is alert and oriented to person, place, and time.       Assessment/Plan:   Calculus of gallbladder with chronic cholecystitis without obstruction    Left thyroid nodule      Patient has erythema with a superficial dehiscence of about a quarter of her lateral incision. It appears to be due to a lot of tension placed on the incision by suturing the umbilical skin back down to the muscle fascia.  I will call and Bactrim DS p.o. b.i.d. for the next week.  She will follow up with me in the upcoming weeks.  She has FNA of her thyroid nodule scheduled in 2 weeks.  She will return to the office after FNA.  No follow-ups on file.

## 2019-11-18 ENCOUNTER — HOSPITAL ENCOUNTER (OUTPATIENT)
Dept: RADIOLOGY | Facility: HOSPITAL | Age: 59
Discharge: HOME | End: 2019-11-18
Attending: SURGERY
Payer: COMMERCIAL

## 2019-11-18 PROCEDURE — A4550 SURGICAL TRAYS: HCPCS

## 2019-11-18 PROCEDURE — 30000890 LABCORP MISCELLANEOUS TEST

## 2019-11-18 PROCEDURE — 36415 COLL VENOUS BLD VENIPUNCTURE: CPT

## 2019-11-18 PROCEDURE — 88173 CYTOPATH EVAL FNA REPORT: CPT

## 2019-11-18 PROCEDURE — 10005 FNA BX W/US GDN 1ST LES: CPT

## 2019-11-18 PROCEDURE — 27000342 US FINE NEEDLE ASPIRATION BIOPSY, FIRST LESION

## 2019-11-25 ENCOUNTER — TELEPHONE (OUTPATIENT)
Dept: SURGERY | Facility: CLINIC | Age: 59
End: 2019-11-25

## 2019-12-01 DIAGNOSIS — K21.9 LARYNGOPHARYNGEAL REFLUX (LPR): ICD-10-CM

## 2019-12-02 RX ORDER — SUCRALFATE 1 G/1
TABLET ORAL
Qty: 360 TABLET | Refills: 0 | Status: SHIPPED | OUTPATIENT
Start: 2019-12-02 | End: 2023-04-06

## 2019-12-03 ENCOUNTER — OFFICE VISIT (OUTPATIENT)
Dept: ALLERGY | Facility: CLINIC | Age: 59
End: 2019-12-03
Payer: COMMERCIAL

## 2019-12-03 ENCOUNTER — OFFICE VISIT (OUTPATIENT)
Dept: PULMONOLOGY | Facility: CLINIC | Age: 59
End: 2019-12-03
Payer: COMMERCIAL

## 2019-12-03 VITALS
DIASTOLIC BLOOD PRESSURE: 64 MMHG | BODY MASS INDEX: 36.15 KG/M2 | WEIGHT: 217 LBS | SYSTOLIC BLOOD PRESSURE: 122 MMHG | HEIGHT: 65 IN | TEMPERATURE: 98 F

## 2019-12-03 VITALS
HEART RATE: 84 BPM | DIASTOLIC BLOOD PRESSURE: 64 MMHG | SYSTOLIC BLOOD PRESSURE: 122 MMHG | WEIGHT: 217 LBS | BODY MASS INDEX: 36.15 KG/M2 | OXYGEN SATURATION: 97 % | HEIGHT: 65 IN

## 2019-12-03 DIAGNOSIS — R06.00 DYSPNEA AND RESPIRATORY ABNORMALITIES: ICD-10-CM

## 2019-12-03 DIAGNOSIS — J31.0 NON-ALLERGIC RHINITIS: Primary | ICD-10-CM

## 2019-12-03 DIAGNOSIS — R49.0 VOICE HOARSENESS: ICD-10-CM

## 2019-12-03 DIAGNOSIS — J45.40 MODERATE PERSISTENT ASTHMA, UNSPECIFIED WHETHER COMPLICATED: Primary | ICD-10-CM

## 2019-12-03 DIAGNOSIS — R13.19 ESOPHAGEAL DYSPHAGIA: ICD-10-CM

## 2019-12-03 DIAGNOSIS — R49.0 HOARSENESS: ICD-10-CM

## 2019-12-03 DIAGNOSIS — R05.3 CHRONIC COUGH: ICD-10-CM

## 2019-12-03 DIAGNOSIS — R06.89 DYSPNEA AND RESPIRATORY ABNORMALITIES: ICD-10-CM

## 2019-12-03 DIAGNOSIS — K21.9 LARYNGOPHARYNGEAL REFLUX (LPR): ICD-10-CM

## 2019-12-03 PROCEDURE — 3008F PR BODY MASS INDEX (BMI) DOCUMENTED: ICD-10-PCS | Mod: S$GLB,,, | Performed by: ALLERGY & IMMUNOLOGY

## 2019-12-03 PROCEDURE — 99214 OFFICE O/P EST MOD 30 MIN: CPT | Mod: S$GLB,,, | Performed by: ALLERGY & IMMUNOLOGY

## 2019-12-03 PROCEDURE — 99214 PR OFFICE/OUTPT VISIT, EST, LEVL IV, 30-39 MIN: ICD-10-PCS | Mod: S$GLB,,, | Performed by: ALLERGY & IMMUNOLOGY

## 2019-12-03 PROCEDURE — 99214 OFFICE O/P EST MOD 30 MIN: CPT | Mod: S$GLB,,, | Performed by: INTERNAL MEDICINE

## 2019-12-03 PROCEDURE — 3008F BODY MASS INDEX DOCD: CPT | Mod: S$GLB,,, | Performed by: ALLERGY & IMMUNOLOGY

## 2019-12-03 PROCEDURE — 3008F BODY MASS INDEX DOCD: CPT | Mod: S$GLB,,, | Performed by: INTERNAL MEDICINE

## 2019-12-03 PROCEDURE — 3008F PR BODY MASS INDEX (BMI) DOCUMENTED: ICD-10-PCS | Mod: S$GLB,,, | Performed by: INTERNAL MEDICINE

## 2019-12-03 PROCEDURE — 99214 PR OFFICE/OUTPT VISIT, EST, LEVL IV, 30-39 MIN: ICD-10-PCS | Mod: S$GLB,,, | Performed by: INTERNAL MEDICINE

## 2019-12-03 RX ORDER — FAMOTIDINE 20 MG/1
20 TABLET, FILM COATED ORAL 2 TIMES DAILY
Qty: 60 TABLET | Refills: 11 | Status: SHIPPED | OUTPATIENT
Start: 2019-12-03 | End: 2023-04-06

## 2019-12-03 NOTE — PROGRESS NOTES
"Office Visit    Patient Name: Vadlez Salazar  MRN: [id number]  : 1960      Reason for visit: Asthma    HPI:     2018 - Pt with h/o asthma ( has seen Dr Bill in past), has trouble with dyspnea.  Has methacholine challenge test which was "positive".  Was on singulair and BREO, still had difficulties and "thick mucus".  Has trouble when reclining.  Has seen GI to evaluate for reflux ( had esophageal "stretched").  Was referred to Dr Kelly for evaluation and now here to establish care.  Hutchins also seen Dr Hart for ENT evaluation.  Had medication changes done by dr Kelly and feels better overall.    2018 - HAs been doing fine until last few days then she had some waking episodes.  This AM had some increased chest tightness and has used rescue inhaler more.  Has had some dysphagia and saw Dr Marroquin and was started on diflucan.  Feels better with regards with this.  Has not noted wheezing but does feel tight.    12/3/2018 - Here for follow up to have left knee replacement at [facility] this Friday, breathing is ok, has some cough and mucus.  After last vii she developed laryngitis which has yet to clear (? If related to her Symbicort) - she is to see Dr Peterson tomorrow AM.    Preoperative Pulmonary Clearance    Pt was seen for preoperative clearance from a pulmonary standpoint for planned left polly replacement.  The risks of the procedure have been discussed with the patient including the risk of prolonged ventilatory support/difficulty weaning from ventilator, post-procedure pneumonia, post-procedure respiratory failure and DVT/pulmonary embolism.  The pt is currently stable from their respiratory status and they are cleared for the planned procedure at increased risk.  The risk should not be considered prohibitive.  If you have any questions please contact me.  This clearance is pending evaluation by ENT for persistent hoarseness.    2019 - Here for follow up, doing better overall.  Has seen ENT " "and was found to have a vocal cord nodule, is being treated for reflux.  Still with significant vocal issues.  Mucus has been better.  Feels that asthma control is better and cough is much better than where it was.    5/28/2019 - Here for follow up.  Asthma control has better.  Hoarseness has been better - she has found that having a small cocktail daily had helped with her symptoms.  She tells me that her vocal cord nodule is "better".  Cough is much better. No issues with her medications but does have some concerns that her vocal issues may be related to her inhalers.    7/25/2019 - Was in ER 7/6 with pleurisy (ER note reviewed) - had CTA - negative but had finding of gallstones.   Continues with URI symptoms, post nasal drip.  Has some right arm discomfort (some soreness to right deltoid muscle).  Has some mucus at times which is clear.  Has not noted reflux symptoms (still taking meds).  Asthma control has been OK overall.  Gave her Dr Comer's     12/3/2019 - Here for follow up, since last visit she had cholecystectomy, found to have thyroid nodule and was told that it was benign.  Asthma control has been good overall - does note some cough after taking her symbicort at night.  Has had a head cold recently - better now.      Asthma Flowsheet    Asthma -  Moderate  Persistent       Controlled    ACT - 21    Baseline PFT - FEV1- 95 %    Serum eosinophil count - 300  Serum IgE - < 2    Allergy testing - na   Desensitization - no    Therapy: ICS/LABA/LAMA +      PRN albuterol +                 Singulair                  Xolair                  Nucala                  Cinqair       Past Medical History    Past Medical History:   Diagnosis Date    Acid reflux     Allergic sinusitis     Arthritis     knees, hands    Asthma 2017    dr finn    Carpal tunnel syndrome on left     no surg yet    Gall stones 09/2019    found with lung w/o-- numerous stones-- surg scheduled    Hoarseness     Jaw disease     occ " jaw locks    Low iron     Migraines     rare now    Numbness     right hand-     Phobia     of needles-- hard to find iv's    PONV (postoperative nausea and vomiting)     Thyroid disease     trouble swallowing-- being w/o    Vocal cord nodules        Past Surgical History    Past Surgical History:   Procedure Laterality Date    ADENOIDECTOMY      CARPAL TUNNEL RELEASE Right 2014     SECTION      COLONOSCOPY  2013    ELBOW SURGERY Left     and hardware removal    HYSTERECTOMY      JOINT REPLACEMENT  2018    LAPAROSCOPIC CHOLECYSTECTOMY N/A 10/25/2019    Procedure: CHOLECYSTECTOMY, LAPAROSCOPIC;  Surgeon: Douglas Quiroz III, MD;  Location: [facility] OR;  Service: General;  Laterality: N/A;    REPAIR OF RECTOCELE      TONSILLECTOMY      TOTAL KNEE ARTHROPLASTY Left 2018    Procedure: ARTHROPLASTY, KNEE, TOTAL;  Surgeon: Claude S. Williams IV, MD;  Location: [facility] OR;  Service: Orthopedics;  Laterality: Left;       Medications      Current Outpatient Medications:     (Magic mouthwash) 1:1:1 Benadryl 12.5mg/5ml liq, aluminum & magnesium hydroxide-simehticone (Maalox), lidocaine viscous 2%, Swish and spit 5 mLs every 4 (four) hours as needed (cough , sore throat.)., Disp: 300 mL, Rfl: 1    albuterol (PROVENTIL/VENTOLIN HFA) 90 mcg/actuation inhaler, INHALE 2 PUFFS INTO THE LUNGS EVERY 4 TO 6 HOURS AS NEEDED FOR WHEEZING. RESCUE, Disp: 8.5 Inhaler, Rfl: 6    azelastine (ASTELIN) 137 mcg (0.1 %) nasal spray, 1 spray per nare twice per day, may use up to 4 times daily 1 spray per nare for congestion and post nasal drip, Disp: 90 mL, Rfl: 4    diphenhydrAMINE (BENADRYL) 25 mg capsule, Take 25 mg by mouth every 6 (six) hours as needed for Itching., Disp: , Rfl:     famotidine (PEPCID) 20 MG tablet, Take 1 tablet (20 mg total) by mouth 2 (two) times daily., Disp: 60 tablet, Rfl: 11    inhalat. spacing dev,sm. mask (AEROCHAMBER PLUS FLOW-VU,S MSK) Spcr, 1 Device by Misc.(Non-Drug; Combo  Route) route as needed., Disp: 1 each, Rfl: 3    pantoprazole (PROTONIX) 40 MG tablet, Take 40 mg by mouth 2 (two) times daily., Disp: , Rfl:     predniSONE (DELTASONE) 10 MG tablet, Take 3 a day x 5 days then 2 a day x 5 days then 1 a day x 5 days, Disp: 30 tablet, Rfl: 0    sucralfate (CARAFATE) 1 gram tablet, TAKE 1 TABLET BY MOUTH 4 TIMES A DAY BEFORE MEALS AND NIGHTLY, Disp: 360 tablet, Rfl: 0    SYMBICORT 160-4.5 mcg/actuation HFAA, INHALE 2 PUFFS INTO THE LUNGS EVERY 12 (TWELVE) HOURS., Disp: 30.6 Inhaler, Rfl: 6    traMADol (ULTRAM) 50 mg tablet, Take 1 tablet (50 mg total) by mouth every 4 (four) hours as needed for Pain., Disp: 30 tablet, Rfl:     valACYclovir (VALTREX) 1000 MG tablet, Take 1,000 mg by mouth daily as needed. , Disp: , Rfl: 3    Allergies    Review of patient's allergies indicates:   Allergen Reactions    Codeine Nausea And Vomiting    Stadol [butorphanol tartrate] Shortness Of Breath     Pt reports stops breathing    Wiley-dur Shortness Of Breath     Stops breathing    Morphine Nausea And Vomiting     Severe.      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   HENT: Positive for congestion. Negative for nosebleeds.         + hoarseness   Eyes: Negative for pain.   Respiratory:  "Positive for cough. Negative for hemoptysis, sputum production, shortness of breath, wheezing and stridor.    Cardiovascular: Negative for chest pain, palpitations, orthopnea, claudication, leg swelling and PND.   Gastrointestinal: Positive for heartburn. Negative for abdominal pain, blood in stool, constipation, diarrhea, melena, nausea and vomiting.   Genitourinary: Negative for dysuria, flank pain, frequency, hematuria and urgency.   Musculoskeletal: Negative for falls and myalgias.   Skin: Negative for itching and rash.   Neurological: Negative for sensory change, focal weakness and weakness.   Psychiatric/Behavioral: Negative for depression. The patient is not nervous/anxious.        Physical Exam    Vitals:    12/03/19 0929   BP: 122/64   Pulse: 84   SpO2: 97%   Weight: 98.4 kg (217 lb)   Height: 5' 5" (1.651 m)       Physical Exam   Constitutional: She is oriented to person, place, and time. She appears well-developed and well-nourished. No distress.   HENT:   Head: Normocephalic and atraumatic.   Right Ear: External ear normal.   Left Ear: External ear normal.   Nose: Nose normal.   Mouth/Throat: Oropharynx is clear and moist.   + minimal post nasal drip   Eyes: Pupils are equal, round, and reactive to light. Conjunctivae and EOM are normal.   Neck: Normal range of motion. Neck supple. No JVD present. No tracheal deviation present. No thyromegaly present.   Cardiovascular: Normal rate, regular rhythm, normal heart sounds and intact distal pulses. Exam reveals no gallop and no friction rub.   No murmur heard.  Pulmonary/Chest: Effort normal and breath sounds normal. No stridor. No respiratory distress. She has no wheezes. She has no rales. She exhibits no tenderness.   Abdominal: Soft. Bowel sounds are normal. She exhibits no distension. There is no tenderness.   Musculoskeletal: Normal range of motion. She exhibits no edema or tenderness.   Lymphadenopathy:     She has no cervical adenopathy.   Neurological: " She is alert and oriented to person, place, and time. No cranial nerve deficit.   Skin: Skin is warm and dry. She is not diaphoretic.   Psychiatric: She has a normal mood and affect. Her behavior is normal.   Nursing note and vitals reviewed.      Labs    Lab Results   Component Value Date    WBC 6.61 10/21/2019    HGB 13.7 10/21/2019    HCT 43.0 10/21/2019     10/21/2019       Sodium   Date Value Ref Range Status   10/21/2019 141 136 - 145 mmol/L Final     Potassium   Date Value Ref Range Status   10/21/2019 4.1 3.5 - 5.1 mmol/L Final     Chloride   Date Value Ref Range Status   10/21/2019 105 95 - 110 mmol/L Final     CO2   Date Value Ref Range Status   10/21/2019 27 23 - 29 mmol/L Final     Glucose   Date Value Ref Range Status   10/21/2019 94 70 - 110 mg/dL Final     BUN, Bld   Date Value Ref Range Status   10/21/2019 17 8 - 23 mg/dL Final     Creatinine   Date Value Ref Range Status   10/21/2019 0.6 0.5 - 1.4 mg/dL Final   07/29/2013 0.7 0.5 - 1.4 mg/dL Final     Calcium   Date Value Ref Range Status   10/21/2019 9.5 8.7 - 10.5 mg/dL Final   07/29/2013 9.1 8.7 - 10.5 mg/dL Final     Total Protein   Date Value Ref Range Status   10/21/2019 7.1 6.0 - 8.4 g/dL Final     Albumin   Date Value Ref Range Status   10/21/2019 4.4 3.5 - 5.2 g/dL Final     Total Bilirubin   Date Value Ref Range Status   10/21/2019 0.6 0.1 - 1.0 mg/dL Final     Comment:     For infants and newborns, interpretation of results should be based  on gestational age, weight and in agreement with clinical  observations.  Premature Infant recommended reference ranges:  Up to 24 hours.............<8.0 mg/dL  Up to 48 hours............<12.0 mg/dL  3-5 days..................<15.0 mg/dL  6-29 days.................<15.0 mg/dL       Alkaline Phosphatase   Date Value Ref Range Status   10/21/2019 60 55 - 135 U/L Final   07/29/2013 79 55 - 135 U/L Final     AST   Date Value Ref Range Status   10/21/2019 17 10 - 40 U/L Final   07/29/2013 17 10 - 40  U/L Final     ALT   Date Value Ref Range Status   10/21/2019 19 10 - 44 U/L Final     Anion Gap   Date Value Ref Range Status   10/21/2019 9 8 - 16 mmol/L Final   07/29/2013 13 5 - 15 meq/L Final       Xrays        Impression/Plan    Problem List Items Addressed This Visit        ENT    Chronic rhinitis  - continue present treatments  - seems better       Pulmonary    Chronic cough  - multifactorial  - better overall       GI    Laryngopharyngeal reflux (LPR)  - being treated      Other Visit Diagnoses     Asthma, unspecified asthma severity, unspecified whether complicated, unspecified whether persistent  - stable and at baseline  - RTC 6 month    Vitamin D deficiency  - being replaced    Laryngitis  - better    Allergies  - sees Dr Robin Corbin MD

## 2019-12-03 NOTE — PROGRESS NOTES
Subjective:       Patient ID: Valdez Salazar is a 59 y.o. female.    Chief Complaint: Allergic Rhinitis  (did very well until about a week ago, she got hoarse and developed a cough/sore throat - no fever)       Pt presents for chronic cough, rhinitis, dysphagia.     Other doctors in tx: Dr. Hart; Dr. Marroquin;  Dr. Corbin.     Non allergic rhinitis   Rhinitis: serum IgE - negative NON-allergic.   Condition: pnd improved  Her voice is hoarse but improving  ENT - Dr. Peterson states she is getting a nodule on her vocal cord. Was advised to increase water intake and to decrease force of cough due to nodule.   Pt states she stopped nasal sprays post her ENT visit.   Pt states being off the sprays, there really wasn't a change in her symptoms vs being on it.   Still using saline.     LPR:  Was started on carafate and bid protonix x 6 weeks. Using zantac prn after dinner if she feels there are seasonings.   Main issue is reflux that is causing mucus.   She can only get carafate in about twice per day.   A trigger has been bell peppers in a meal she ate and felt that the mucus filled up in her throat, she had ranitidine 150 mg that then calmed down the episode along with benadryl.   Pt states she needs to clear the mucus to breathe and cough. Zantac helps.   States everything she eats, can't breathe. Takes ranitidine - may take after eating, and benadryl-D that may help temporarily.   Pt states once she takes these meds she feels she can breathe.   She stopped the protonix 40 mg. Feels wasn't helping and took in the evening.   Does notice diet changes have improved symptoms.   She may take benadryl daily. This may increase mucus viscosity.   Dr. Marroquin- esophagus stretching may need to occur.   Getting food stuck few days per week with breads. 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   Pt notes onions, bellpeppers, heavily seasoned foods can exacerbate mucus.     Prior history   In the past she has tried omeprazole 40 mg  "BID, but Dr. farrar took her off this regimen.   Dr. Hart performed scope and dx with LPR and sent her to GI, Dr. Farrar. No vocal cord lesions at that time, but now may be developing due to constant throat clearing.     Cough: did well until a week ago  Condition: mucus from eating has been somewhat improved but not resolved. A week ago developed uri sx.   Trigger: weather changes.   Sx: coughing spells, thick mucus- main issue is the thick mucus    Has symptoms despite the spirva and symbicort. Stopped spiriva 2 weeks ago.   Increased use of rescue inhaler.   Intensity: They can be bad enough to where she may go to the ED- however she is 90 % improved.   Tx: started symbicort 80 1 puff bid- now improved but increased to 160 2 puffs twice per day by Dr. Corbin,  spiriva 2 puff qday rx given 1 puff twice per day, mucinex D. Magic mouthwash qhs.  We had a trial off montelukast. She is on a medrol pack and finishing for orthopedics.    -Rescue inhaler may or may not help, not a consistent efficacy. mucinex-D helps with thickness of mucus. Discussed D can increase her BP.   -She has been on an antibiotic but isn't unsure of the name or type. - early June 2018. It helped the "color " of the mucus but hasn't had improvement in sx.   Trigger: when she eats, more mucus comes and she coughs. Doesn't matter if it's water or food.   Protonix 40 mg given for BID x 4 weeks then once daily. Thinks that it helped with the mucus. BID was a better formulation than qday. But pt has been inconsistent with this.     Duration: She has been having these symptoms for the past year.    Voice is still improved off breo.   Added cranberry and lime at night. States her throat as much.   Of note, Since she's been out of [facility], her symptoms have been bad.   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     Imaging:  Ct chest reviewed and no significant findings.   Sinus ct in 2015 reviewed and no significant findings.  " "  Discussed possibly repeating as it has been 3 years since last scan.     Atopic history  Asthma: no history - but had a methacholine challenge- per patient report positive.   - stopped breo due to dysphonia. and montelukast was d/c. Now seeing Dr. Corbin.   Rhinitis: see above  Dysphagia: at times food gets stuck- see Dr. Sheltonor for reflux- needs f/u  Food allergy: none   Oral allergy: none       Review of Systems      General: neg unexpected weight changes, fevers, chills, night sweats, malaise  HEENT: see hpi, Neg eye pain, vision changes, ear drainage, nose bleeds, throat tightness, sores in the mouth  CV: Neg chest pain, palpitations, swelling  Resp: see hpi, neg shortness of breath, hemoptysis  GI: see hpi, neg night abdominal pain, chronic diarrhea, chronic constipation  Derm: See Hpi, neg new rash, neg flushing  Mu/sk: Neg joint pain, joint swelling   Psych: Neg anxiety  neuro: neg chronic headaches, muscle weakness  Endo: neg heat/cold intolerance, chronic fatigue    Objective:     Vitals:    12/03/19 0834   BP: 122/64   Temp: 98.4 °F (36.9 °C)   TempSrc: Oral   Weight: 98.4 kg (217 lb)   Height: 5' 5" (1.651 m)        Physical Exam      General: no acute distress, well developed well nourished voice hoarseness   HEENT:   Head:normocephalic atraumatic  Eyes: YOU, EOMI, Neg injection, scleral icterus, or conjunctival papillary hypertrophy.  Ears: tm clear bilaterally, normal canal  Nose: 2-3+ inferior turbinates red, neg nasal polyps            Mucosa: dry             Septal irritation: none   OP: mucus membranes moist, - cobblestoning, - PND, neg erythema or lesions  Neck: supple, Full range of motion, neg lymphadenopathy  Chest: full respiratory excursion no abnormal chest abnormality  Resp: clear to ascultation bilaterally  CV: RRR, neg MRG, brisk capillary refill  Abdomen: BS+, non tender, non distended  Ext:  Neg clubbing, cyanosis, pitting edema  Skin: Neg rashes or lesions  Lymph: neg " supraclavicular, axillary         Assessment:       1. Non-allergic rhinitis    2. Chronic cough    3. Hoarseness    4. Voice hoarseness    5. Esophageal dysphagia    6. Laryngopharyngeal reflux (LPR)    7. Dyspnea and respiratory abnormalities        Plan:       Non-allergic rhinitis    Chronic cough    Hoarseness  -     (Magic mouthwash) 1:1:1 Benadryl 12.5mg/5ml liq, aluminum & magnesium hydroxide-simehticone (Maalox), lidocaine viscous 2%; Swish and spit 5 mLs every 4 (four) hours as needed (cough , sore throat.).  Dispense: 300 mL; Refill: 1    Voice hoarseness    Esophageal dysphagia    Laryngopharyngeal reflux (LPR)  -     famotidine (PEPCID) 20 MG tablet; Take 1 tablet (20 mg total) by mouth 2 (two) times daily.  Dispense: 60 tablet; Refill: 11    Dyspnea and respiratory abnormalities  -     inhalat. spacing dev,sm. mask (AEROCHAMBER PLUS FLOW-VU,S MSK) Spcr; 1 Device by Misc.(Non-Drug; Combo Route) route as needed.  Dispense: 1 each; Refill: 3        Hoarseness  - improved until a week ago now exacerbated.   - symbicort 160 2 puffs bid- we may need to consider a pro drug alvesco to see if this helps with hoarseness.   -continue magic mouthwash to help with throat irritation prn.   - carafate qid before meals and bedtime. Pt not to be so strict on timing to take and try to get in more than 2 pills per day.   stop ranitidine but 150 mg bid  Change to pepcid 20 mg bid.   protonix 40 bid x 6 weeks but pt wants to continue for 8 weeks to 3 months.   Defer to GI for reflux management.     Chronic rhinitis- inhalant panel quest - negative.   - saline, pt elects to stop nasal sprays   - technique reviewed today    - continue plain mucinex 1200 mg bid- discussed D or pseudophed has risk to increase her BP.   Limit the amount of dairy as it may increase mucus production.     Dyspnea- reflux being treated, feels mucus is more of the trigger- medications are helping.   - try to limit pnd as a source, ipratopium prn   -  discontinue 2 puffs spiriva qday- pt states coughing fits   - continue lpr management per gi   - pt to continue pulmonary management- positive methacholine challenge   - trial off montelukast to simplify her medication routine.     Lpr/dysphagia - reflux as an exacerbater.   - continue ppi -  pantoprazole 40 mg to BID- needs to decrease to help curb potential SE. Would like to extend to 3 months.   - esophagus stretching may need to occur with GI.   Spicy foods are temporally related to increased mucus. Especially bell pepper onion and garlic   - discussed to see Dr. Marroquin as food is getting stuck a few days per week.   - Dr. Marroquin may stretch esophagus.   - carafate qid before meals and bedtime.   -Continue ranitidine but 150 mg bid  - protonix 40 bid x 6 weeks then qday- trial: done but wants extend to 3 months.     Reviewed CBC, Ige, and Vitamin D labs, pt to continue 5000 IU daily.   Highest eos 300.       Follow up in 6-12 months. Sooner if needed.         Arabella Kelly M.D.  Allergy/Immunology  [facility]   [phone] phone  [phone] fax

## 2019-12-03 NOTE — PATIENT INSTRUCTIONS
Nose:  Moisturize  With vaseline   Continue saline     Lung:  Breathe in slower with symbicort      Throat:  Magic mouthwash as needed  Table spoon of honey with tea       Continue current gi regimen

## 2019-12-04 ENCOUNTER — OFFICE VISIT (OUTPATIENT)
Dept: SURGERY | Facility: CLINIC | Age: 59
End: 2019-12-04
Payer: COMMERCIAL

## 2019-12-04 VITALS
HEART RATE: 80 BPM | HEIGHT: 65 IN | SYSTOLIC BLOOD PRESSURE: 111 MMHG | TEMPERATURE: 99 F | DIASTOLIC BLOOD PRESSURE: 70 MMHG | BODY MASS INDEX: 36.15 KG/M2 | WEIGHT: 217 LBS

## 2019-12-04 DIAGNOSIS — E04.1 LEFT THYROID NODULE: Primary | ICD-10-CM

## 2019-12-04 LAB
LABCORP MISC TEST CODE: 9001
LABCORP MISC TEST NAME: NORMAL
LABCORP MISCELLANEOUS TEST: NORMAL

## 2019-12-04 PROCEDURE — 99212 PR OFFICE/OUTPT VISIT, EST, LEVL II, 10-19 MIN: ICD-10-PCS | Mod: 24,S$GLB,, | Performed by: SURGERY

## 2019-12-04 PROCEDURE — 99212 OFFICE O/P EST SF 10 MIN: CPT | Mod: 24,S$GLB,, | Performed by: SURGERY

## 2019-12-05 DIAGNOSIS — R49.0 HOARSENESS: ICD-10-CM

## 2019-12-05 RX ORDER — PANTOPRAZOLE SODIUM 40 MG/1
TABLET, DELAYED RELEASE ORAL
Qty: 180 TABLET | Refills: 3 | Status: SHIPPED | OUTPATIENT
Start: 2019-12-05 | End: 2023-04-06

## 2019-12-11 NOTE — PROGRESS NOTES
Subjective:       Patient ID: Valdez Salazar is a 59 y.o. female.    Chief Complaint: Follow-up (FU thyroid FNA )      HPI:  Patient returns to review pathology report results from her 14 mm left thyroid nodule FNA.  Path returned no evidence of malignancy with rare bland follicular cells present. She has no new complaints today.  She reports that she had no immediate complication after procedure.    Past Medical History:   Diagnosis Date    Acid reflux     Allergic sinusitis     Arthritis     knees, hands    Asthma     dr finn    Carpal tunnel syndrome on left     no surg yet    Gall stones 2019    found with lung w/o-- numerous stones-- surg scheduled    Hoarseness     Jaw disease     occ jaw locks    Low iron     Migraines     rare now    Numbness     right hand-     Phobia     of needles-- hard to find iv's    PONV (postoperative nausea and vomiting)     Thyroid disease     trouble swallowing-- being w/o    Vocal cord nodules      Past Surgical History:   Procedure Laterality Date    ADENOIDECTOMY      CARPAL TUNNEL RELEASE Right      SECTION      COLONOSCOPY  2013    ELBOW SURGERY Left     and hardware removal    HYSTERECTOMY      JOINT REPLACEMENT  2018    LAPAROSCOPIC CHOLECYSTECTOMY N/A 10/25/2019    Procedure: CHOLECYSTECTOMY, LAPAROSCOPIC;  Surgeon: Douglas Quiroz III, MD;  Location: [facility] OR;  Service: General;  Laterality: N/A;    REPAIR OF RECTOCELE      TONSILLECTOMY      TOTAL KNEE ARTHROPLASTY Left 2018    Procedure: ARTHROPLASTY, KNEE, TOTAL;  Surgeon: Claude S. Williams IV, MD;  Location: [facility] OR;  Service: Orthopedics;  Laterality: Left;     Review of patient's allergies indicates:   Allergen Reactions    Codeine Nausea And Vomiting    Stadol [butorphanol tartrate] Shortness Of Breath     Pt reports stops breathing    Wiley-dur Shortness Of Breath     Stops breathing    Morphine Nausea And Vomiting     Severe. & low oxygen level       Pt states can take demerol     Medication List with Changes/Refills   Current Medications    (MAGIC MOUTHWASH) 1:1:1 BENADRYL 12.5MG/5ML LIQ, ALUMINUM & MAGNESIUM HYDROXIDE-SIMEHTICONE (MAALOX), LIDOCAINE VISCOUS 2%    Swish and spit 5 mLs every 4 (four) hours as needed (cough , sore throat.).    ALBUTEROL (PROVENTIL/VENTOLIN HFA) 90 MCG/ACTUATION INHALER    INHALE 2 PUFFS INTO THE LUNGS EVERY 4 TO 6 HOURS AS NEEDED FOR WHEEZING. RESCUE    AZELASTINE (ASTELIN) 137 MCG (0.1 %) NASAL SPRAY    1 spray per nare twice per day, may use up to 4 times daily 1 spray per nare for congestion and post nasal drip    DIPHENHYDRAMINE (BENADRYL) 25 MG CAPSULE    Take 25 mg by mouth every 6 (six) hours as needed for Itching.    FAMOTIDINE (PEPCID) 20 MG TABLET    Take 1 tablet (20 mg total) by mouth 2 (two) times daily.    INHALAT. SPACING DEV,SM. MASK (AEROCHAMBER PLUS FLOW-VU,S MSK) SPCR    1 Device by Misc.(Non-Drug; Combo Route) route as needed.    PANTOPRAZOLE (PROTONIX) 40 MG TABLET    Take 40 mg by mouth 2 (two) times daily.    PREDNISONE (DELTASONE) 10 MG TABLET    Take 3 a day x 5 days then 2 a day x 5 days then 1 a day x 5 days    SUCRALFATE (CARAFATE) 1 GRAM TABLET    TAKE 1 TABLET BY MOUTH 4 TIMES A DAY BEFORE MEALS AND NIGHTLY    SYMBICORT 160-4.5 MCG/ACTUATION HFAA    INHALE 2 PUFFS INTO THE LUNGS EVERY 12 (TWELVE) HOURS.    TRAMADOL (ULTRAM) 50 MG TABLET    Take 1 tablet (50 mg total) by mouth every 4 (four) hours as needed for Pain.    VALACYCLOVIR (VALTREX) 1000 MG TABLET    Take 1,000 mg by mouth daily as needed.    Changed and/or Refilled Medications    Modified Medication Previous Medication    PANTOPRAZOLE (PROTONIX) 40 MG TABLET pantoprazole (PROTONIX) 40 MG tablet       TAKE 1 TABLET BY MOUTH TWICE A DAY    Take 1 tablet (40 mg total) by mouth 2 (two) times daily.     Family History   Problem Relation Age of Onset    Allergies Mother     Emphysema Mother     Hypertension Mother     Prostate cancer  Father     Alzheimer's disease Father     Kidney disease Father     Heart disease Father     Asthma Sister     Asthma Brother      Social History     Socioeconomic History    Marital status:      Spouse name: Not on file    Number of children: Not on file    Years of education: Not on file    Highest education level: Not on file   Occupational History    Not on file   Social Needs    Financial resource strain: Not on file    Food insecurity:     Worry: Not on file     Inability: Not on file    Transportation needs:     Medical: Not on file     Non-medical: Not on file   Tobacco Use    Smoking status: Never Smoker    Smokeless tobacco: Never Used   Substance and Sexual Activity    Alcohol use: Yes     Comment: Occasionally     Drug use: No    Sexual activity: Not on file   Lifestyle    Physical activity:     Days per week: Not on file     Minutes per session: Not on file    Stress: Not on file   Relationships    Social connections:     Talks on phone: Not on file     Gets together: Not on file     Attends Confucianism service: Not on file     Active member of club or organization: Not on file     Attends meetings of clubs or organizations: Not on file     Relationship status: Not on file   Other Topics Concern    Not on file   Social History Narrative    Not on file         Review of Systems   Constitutional: Negative for appetite change, chills, fever and unexpected weight change.   HENT: Positive for voice change. Negative for hearing loss, rhinorrhea and sore throat.    Eyes: Negative for photophobia and visual disturbance.   Respiratory: Negative for cough, choking and shortness of breath.    Cardiovascular: Negative for chest pain, palpitations and leg swelling.   Gastrointestinal: Negative for abdominal pain, blood in stool, constipation, diarrhea, nausea and vomiting.   Endocrine: Negative for cold intolerance, heat intolerance, polydipsia and polyuria.   Musculoskeletal: Negative  for arthralgias, back pain, joint swelling and neck stiffness.   Skin: Negative for color change, pallor and rash.   Neurological: Negative for dizziness, seizures, syncope and headaches.   Hematological: Negative for adenopathy. Does not bruise/bleed easily.   Psychiatric/Behavioral: Negative for agitation, behavioral problems and confusion.       Objective:      Physical Exam   Constitutional: She is oriented to person, place, and time. She is cooperative.  Non-toxic appearance. No distress.   Neck: No thyroid mass and no thyromegaly present.   No evidence of hematoma or bruising in the neck.   Pulmonary/Chest: Effort normal. No accessory muscle usage. No tachypnea. No respiratory distress.   Neurological: She is alert and oriented to person, place, and time.       Assessment/Plan:   Left thyroid nodule      Pathology results returned benign.  She continues to be hoarse but I have a low suspicion that it is thyroid related.  No indication for left thyroid lobectomy.  Will continued to observe the nodule with yearly ultrasounds.  She will follow up in 1 year after ultrasound.

## 2020-01-17 ENCOUNTER — TELEPHONE (OUTPATIENT)
Dept: PULMONOLOGY | Facility: CLINIC | Age: 60
End: 2020-01-17

## 2020-01-17 RX ORDER — AZITHROMYCIN 250 MG/1
TABLET, FILM COATED ORAL
Qty: 6 TABLET | Refills: 0 | Status: SHIPPED | OUTPATIENT
Start: 2020-01-17 | End: 2023-04-06

## 2020-01-17 RX ORDER — PREDNISONE 10 MG/1
TABLET ORAL
Qty: 18 TABLET | Refills: 0 | Status: SHIPPED | OUTPATIENT
Start: 2020-01-17 | End: 2022-12-22 | Stop reason: SDUPTHER

## 2020-01-17 NOTE — TELEPHONE ENCOUNTER
Patient is coughing, sob, pain in back when breathing. Very busy cannot go to the emergency room in pain. Coughing up mucus. Pt thinks its Pleurisy again.

## 2020-06-04 ENCOUNTER — OFFICE VISIT (OUTPATIENT)
Dept: ALLERGY | Facility: CLINIC | Age: 60
End: 2020-06-04
Payer: COMMERCIAL

## 2020-06-04 ENCOUNTER — OFFICE VISIT (OUTPATIENT)
Dept: PULMONOLOGY | Facility: CLINIC | Age: 60
End: 2020-06-04
Payer: COMMERCIAL

## 2020-06-04 VITALS
HEART RATE: 77 BPM | TEMPERATURE: 98 F | WEIGHT: 217 LBS | BODY MASS INDEX: 36.15 KG/M2 | HEIGHT: 65 IN | DIASTOLIC BLOOD PRESSURE: 84 MMHG | OXYGEN SATURATION: 98 % | SYSTOLIC BLOOD PRESSURE: 120 MMHG

## 2020-06-04 VITALS
WEIGHT: 225 LBS | OXYGEN SATURATION: 97 % | HEART RATE: 68 BPM | BODY MASS INDEX: 37.44 KG/M2 | DIASTOLIC BLOOD PRESSURE: 68 MMHG | SYSTOLIC BLOOD PRESSURE: 120 MMHG

## 2020-06-04 DIAGNOSIS — R06.89 DYSPNEA AND RESPIRATORY ABNORMALITIES: ICD-10-CM

## 2020-06-04 DIAGNOSIS — R05.3 CHRONIC COUGH: Primary | ICD-10-CM

## 2020-06-04 DIAGNOSIS — R05.3 CHRONIC COUGH: ICD-10-CM

## 2020-06-04 DIAGNOSIS — R49.0 HOARSENESS: ICD-10-CM

## 2020-06-04 DIAGNOSIS — E55.9 VITAMIN D DEFICIENCY: ICD-10-CM

## 2020-06-04 DIAGNOSIS — R06.00 DYSPNEA AND RESPIRATORY ABNORMALITIES: ICD-10-CM

## 2020-06-04 DIAGNOSIS — J45.40 MODERATE PERSISTENT ASTHMA, UNSPECIFIED WHETHER COMPLICATED: Primary | ICD-10-CM

## 2020-06-04 DIAGNOSIS — J31.0 CHRONIC RHINITIS: ICD-10-CM

## 2020-06-04 DIAGNOSIS — R13.19 ESOPHAGEAL DYSPHAGIA: ICD-10-CM

## 2020-06-04 DIAGNOSIS — K21.9 LARYNGOPHARYNGEAL REFLUX (LPR): ICD-10-CM

## 2020-06-04 PROCEDURE — 99214 PR OFFICE/OUTPT VISIT, EST, LEVL IV, 30-39 MIN: ICD-10-PCS | Mod: S$GLB,,, | Performed by: INTERNAL MEDICINE

## 2020-06-04 PROCEDURE — 3008F BODY MASS INDEX DOCD: CPT | Mod: S$GLB,,, | Performed by: INTERNAL MEDICINE

## 2020-06-04 PROCEDURE — 3008F PR BODY MASS INDEX (BMI) DOCUMENTED: ICD-10-PCS | Mod: S$GLB,,, | Performed by: INTERNAL MEDICINE

## 2020-06-04 PROCEDURE — 99215 OFFICE O/P EST HI 40 MIN: CPT | Mod: S$GLB,,, | Performed by: ALLERGY & IMMUNOLOGY

## 2020-06-04 PROCEDURE — 3008F PR BODY MASS INDEX (BMI) DOCUMENTED: ICD-10-PCS | Mod: S$GLB,,, | Performed by: ALLERGY & IMMUNOLOGY

## 2020-06-04 PROCEDURE — 99215 PR OFFICE/OUTPT VISIT, EST, LEVL V, 40-54 MIN: ICD-10-PCS | Mod: S$GLB,,, | Performed by: ALLERGY & IMMUNOLOGY

## 2020-06-04 PROCEDURE — 3008F BODY MASS INDEX DOCD: CPT | Mod: S$GLB,,, | Performed by: ALLERGY & IMMUNOLOGY

## 2020-06-04 PROCEDURE — 99214 OFFICE O/P EST MOD 30 MIN: CPT | Mod: S$GLB,,, | Performed by: INTERNAL MEDICINE

## 2020-06-04 RX ORDER — BUDESONIDE 0.5 MG/2ML
INHALANT ORAL
Qty: 120 ML | Refills: 3 | Status: SHIPPED | OUTPATIENT
Start: 2020-06-04 | End: 2020-09-10

## 2020-06-04 NOTE — PROGRESS NOTES
"Office Visit    Patient Name: Valdez Salazar  MRN: [id number]  : 1960      Reason for visit: Asthma    HPI:     2018 - Pt with h/o asthma ( has seen Dr Bill in past), has trouble with dyspnea.  Has methacholine challenge test which was "positive".  Was on singulair and BREO, still had difficulties and "thick mucus".  Has trouble when reclining.  Has seen GI to evaluate for reflux ( had esophageal "stretched").  Was referred to Dr Kelly for evaluation and now here to establish care.  Hutchins also seen Dr Hart for ENT evaluation.  Had medication changes done by dr Kelly and feels better overall.    2018 - HAs been doing fine until last few days then she had some waking episodes.  This AM had some increased chest tightness and has used rescue inhaler more.  Has had some dysphagia and saw Dr Marroquin and was started on diflucan.  Feels better with regards with this.  Has not noted wheezing but does feel tight.    12/3/2018 - Here for follow up to have left knee replacement at [facility] this Friday, breathing is ok, has some cough and mucus.  After last vii she developed laryngitis which has yet to clear (? If related to her Symbicort) - she is to see Dr Peterson tomorrow AM.    Preoperative Pulmonary Clearance    Pt was seen for preoperative clearance from a pulmonary standpoint for planned left polly replacement.  The risks of the procedure have been discussed with the patient including the risk of prolonged ventilatory support/difficulty weaning from ventilator, post-procedure pneumonia, post-procedure respiratory failure and DVT/pulmonary embolism.  The pt is currently stable from their respiratory status and they are cleared for the planned procedure at increased risk.  The risk should not be considered prohibitive.  If you have any questions please contact me.  This clearance is pending evaluation by ENT for persistent hoarseness.    2019 - Here for follow up, doing better overall.  Has seen ENT " "and was found to have a vocal cord nodule, is being treated for reflux.  Still with significant vocal issues.  Mucus has been better.  Feels that asthma control is better and cough is much better than where it was.    5/28/2019 - Here for follow up.  Asthma control has better.  Hoarseness has been better - she has found that having a small cocktail daily had helped with her symptoms.  She tells me that her vocal cord nodule is "better".  Cough is much better. No issues with her medications but does have some concerns that her vocal issues may be related to her inhalers.    7/25/2019 - Was in ER 7/6 with pleurisy (ER note reviewed) - had CTA - negative but had finding of gallstones.   Continues with URI symptoms, post nasal drip.  Has some right arm discomfort (some soreness to right deltoid muscle).  Has some mucus at times which is clear.  Has not noted reflux symptoms (still taking meds).  Asthma control has been OK overall.  Gave her Dr Comer's     12/3/2019 - Here for follow up, since last visit she had cholecystectomy, found to have thyroid nodule and was told that it was benign.  Asthma control has been good overall - does note some cough after taking her symbicort at night.  Has had a head cold recently - better now.    6/4/2020 - Here for follow up, overall asthma control has been OK.  She has no known exposure to corona virus and has been practicing social distancing.  Pt asthma is currently stable with no increases in SOB, KAHN or wheezing.  There has been no increase in use of  rescue medications.  Have reviewed medications with pt including the roles of rescue and controlling medications.  All questions answered.  Pt reports no problems with technique with inhalers.  We discussed the possibility of de-escalation of therapy if asthma control remains stable.  Does have some occasional thick mucus.    Asthma Flowsheet    Asthma -  Moderate  Persistent       Controlled    ACT - 21    Baseline PFT - FEV1- 95 " %    Serum eosinophil count - 300  Serum IgE - < 2    Allergy testing - na   Desensitization - no    Therapy: ICS/LABA/LAMA +      PRN albuterol +                 Singulair                  Xolair                  Nucala                  Cinqair       Past Medical History    Past Medical History:   Diagnosis Date    Acid reflux     Allergic sinusitis     Arthritis     knees, hands    Asthma 2017    dr finn    Carpal tunnel syndrome on left     no surg yet    Gall stones 2019    found with lung w/o-- numerous stones-- surg scheduled    Hoarseness     Jaw disease     occ jaw locks    Low iron 2000    Migraines     rare now    Numbness     right hand-     Phobia     of needles-- hard to find iv's    PONV (postoperative nausea and vomiting)     Thyroid disease     trouble swallowing-- being w/o    Vocal cord nodules        Past Surgical History    Past Surgical History:   Procedure Laterality Date    ADENOIDECTOMY      CARPAL TUNNEL RELEASE Right 2014     SECTION      COLONOSCOPY  2013    ELBOW SURGERY Left     and hardware removal    HYSTERECTOMY      JOINT REPLACEMENT  2018    LAPAROSCOPIC CHOLECYSTECTOMY N/A 10/25/2019    Procedure: CHOLECYSTECTOMY, LAPAROSCOPIC;  Surgeon: Douglas Quiroz III, MD;  Location: [facility] OR;  Service: General;  Laterality: N/A;    REPAIR OF RECTOCELE      TONSILLECTOMY      TOTAL KNEE ARTHROPLASTY Left 2018    Procedure: ARTHROPLASTY, KNEE, TOTAL;  Surgeon: Claude S. Williams IV, MD;  Location: [facility] OR;  Service: Orthopedics;  Laterality: Left;       Medications      Current Outpatient Medications:     albuterol (PROVENTIL/VENTOLIN HFA) 90 mcg/actuation inhaler, INHALE 2 PUFFS INTO THE LUNGS EVERY 4 TO 6 HOURS AS NEEDED FOR WHEEZING. RESCUE, Disp: 8.5 Inhaler, Rfl: 6    diphenhydrAMINE (BENADRYL) 25 mg capsule, Take 25 mg by mouth every 6 (six) hours as needed for Itching., Disp: , Rfl:     famotidine (PEPCID) 20 MG tablet, Take 1 tablet  (20 mg total) by mouth 2 (two) times daily., Disp: 60 tablet, Rfl: 11    inhalat. spacing dev,sm. mask (AEROCHAMBER PLUS FLOW-VU,S MSK) Spcr, 1 Device by Misc.(Non-Drug; Combo Route) route as needed., Disp: 1 each, Rfl: 3    pantoprazole (PROTONIX) 40 MG tablet, TAKE 1 TABLET BY MOUTH TWICE A DAY, Disp: 180 tablet, Rfl: 3    SYMBICORT 160-4.5 mcg/actuation HFAA, INHALE 2 PUFFS INTO THE LUNGS EVERY 12 (TWELVE) HOURS., Disp: 30.6 Inhaler, Rfl: 6    valACYclovir (VALTREX) 1000 MG tablet, Take 1,000 mg by mouth daily as needed. , Disp: , Rfl: 3    (Magic mouthwash) 1:1:1 Benadryl 12.5mg/5ml liq, aluminum & magnesium hydroxide-simehticone (Maalox), lidocaine viscous 2%, Swish and spit 5 mLs every 4 (four) hours as needed (cough , sore throat.). (Patient not taking: Reported on 6/4/2020), Disp: 300 mL, Rfl: 1    azelastine (ASTELIN) 137 mcg (0.1 %) nasal spray, 1 spray per nare twice per day, may use up to 4 times daily 1 spray per nare for congestion and post nasal drip (Patient not taking: Reported on 6/4/2020), Disp: 90 mL, Rfl: 4    azithromycin (ZITHROMAX Z-CHRISTIAN) 250 MG tablet, Take 2 po today then 1 a day for 4 days (Patient not taking: Reported on 6/4/2020), Disp: 6 tablet, Rfl: 0    budesonide (PULMICORT) 0.5 mg/2 mL nebulizer solution, Instill into saline rinse 2 vials daily or nightly. (Patient not taking: Reported on 6/4/2020), Disp: 120 mL, Rfl: 3    pantoprazole (PROTONIX) 40 MG tablet, Take 40 mg by mouth 2 (two) times daily., Disp: , Rfl:     predniSONE (DELTASONE) 10 MG tablet, Take 3 a day x 3 days then 2 a day x 3 days then 1 a day x 3 days (Patient not taking: Reported on 6/4/2020), Disp: 18 tablet, Rfl: 0    sucralfate (CARAFATE) 1 gram tablet, TAKE 1 TABLET BY MOUTH 4 TIMES A DAY BEFORE MEALS AND NIGHTLY (Patient not taking: Reported on 6/4/2020), Disp: 360 tablet, Rfl: 0    traMADol (ULTRAM) 50 mg tablet, Take 1 tablet (50 mg total) by mouth every 4 (four) hours as needed for Pain.  (Patient not taking: Reported on 6/4/2020), Disp: 30 tablet, Rfl:     Allergies    Review of patient's allergies indicates:   Allergen Reactions    Codeine Nausea And Vomiting    Stadol [butorphanol tartrate] Shortness Of Breath     Pt reports stops breathing    Wiley-dur Shortness Of Breath     Stops breathing    Morphine Nausea And Vomiting     Severe.      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   HENT: Positive for congestion. Negative for nosebleeds.         + hoarseness   Eyes: Negative for pain.   Respiratory: Positive for cough. Negative for hemoptysis, sputum production, shortness of breath, wheezing and stridor.    Cardiovascular: Negative for chest pain, palpitations, orthopnea, claudication, leg swelling and PND.   Gastrointestinal: Positive for heartburn. Negative for abdominal pain, blood in stool, constipation, diarrhea, melena, nausea and vomiting.   Genitourinary: Negative for dysuria, flank pain, frequency, hematuria and urgency.   Musculoskeletal: Negative for falls and myalgias.   Skin: Negative for itching and rash.   Neurological: Negative for sensory change, focal weakness and weakness.   Psychiatric/Behavioral: Negative for depression. The patient is not nervous/anxious.         Physical Exam    Vitals:    06/04/20 1009   BP: 120/68   BP Location: Left arm   Patient Position: Sitting   Pulse: 68   SpO2: 97%   Weight: 102.1 kg (225 lb)       Physical Exam   Constitutional: She is oriented to person, place, and time. She appears well-developed and well-nourished. No distress.   HENT:   Head: Normocephalic and atraumatic.   Right Ear: External ear normal.   Left Ear: External ear normal.   Nose: Nose normal.   Mouth/Throat: Oropharynx is clear and moist.   + minimal post nasal drip   Eyes: Pupils are equal, round, and reactive to light. Conjunctivae and EOM are normal.   Neck: Normal range of motion. Neck supple. No JVD present. No tracheal deviation present. No thyromegaly present.   Cardiovascular: Normal rate, regular rhythm, normal heart sounds and intact distal pulses. Exam reveals no gallop and no friction rub.   No murmur heard.  Pulmonary/Chest: Effort normal and breath sounds normal. No stridor. No respiratory distress. She has no wheezes. She has no rales. She exhibits no tenderness.   Abdominal: Soft. Bowel sounds are normal. She exhibits no distension. There is no tenderness.   Musculoskeletal: Normal range of motion. She exhibits no edema or tenderness.   Lymphadenopathy:     She has no cervical adenopathy.   Neurological: She is alert and oriented to person, place, and time. No cranial nerve deficit.   Skin: Skin is warm and dry. She is not diaphoretic.   Psychiatric: She has a normal mood and affect. Her behavior is normal.   Nursing note and vitals reviewed.      Labs    Lab Results   Component Value Date    WBC 6.61 10/21/2019    HGB 13.7 10/21/2019    HCT 43.0 10/21/2019     10/21/2019       Sodium   Date Value Ref Range Status   10/21/2019 141 136 - 145 mmol/L Final     Potassium   Date Value Ref Range Status   10/21/2019 4.1 3.5 - 5.1 mmol/L Final     Chloride   Date Value Ref Range Status   10/21/2019 105 95 - 110 mmol/L Final     CO2   Date Value Ref Range  Status   10/21/2019 27 23 - 29 mmol/L Final     Glucose   Date Value Ref Range Status   10/21/2019 94 70 - 110 mg/dL Final     BUN, Bld   Date Value Ref Range Status   10/21/2019 17 8 - 23 mg/dL Final     Creatinine   Date Value Ref Range Status   10/21/2019 0.6 0.5 - 1.4 mg/dL Final   07/29/2013 0.7 0.5 - 1.4 mg/dL Final     Calcium   Date Value Ref Range Status   10/21/2019 9.5 8.7 - 10.5 mg/dL Final   07/29/2013 9.1 8.7 - 10.5 mg/dL Final     Total Protein   Date Value Ref Range Status   10/21/2019 7.1 6.0 - 8.4 g/dL Final     Albumin   Date Value Ref Range Status   10/21/2019 4.4 3.5 - 5.2 g/dL Final     Total Bilirubin   Date Value Ref Range Status   10/21/2019 0.6 0.1 - 1.0 mg/dL Final     Comment:     For infants and newborns, interpretation of results should be based  on gestational age, weight and in agreement with clinical  observations.  Premature Infant recommended reference ranges:  Up to 24 hours.............<8.0 mg/dL  Up to 48 hours............<12.0 mg/dL  3-5 days..................<15.0 mg/dL  6-29 days.................<15.0 mg/dL       Alkaline Phosphatase   Date Value Ref Range Status   10/21/2019 60 55 - 135 U/L Final   07/29/2013 79 55 - 135 U/L Final     AST   Date Value Ref Range Status   10/21/2019 17 10 - 40 U/L Final   07/29/2013 17 10 - 40 U/L Final     ALT   Date Value Ref Range Status   10/21/2019 19 10 - 44 U/L Final     Anion Gap   Date Value Ref Range Status   10/21/2019 9 8 - 16 mmol/L Final   07/29/2013 13 5 - 15 meq/L Final       Xrays        Impression/Plan    Problem List Items Addressed This Visit        ENT    Chronic rhinitis  - continue present treatments  - seems better       Pulmonary    Chronic cough  - multifactorial  - better overall       GI    Laryngopharyngeal reflux (LPR)  - being treated      Other Visit Diagnoses     Asthma, unspecified asthma severity, unspecified whether complicated, unspecified whether persistent  - stable and at baseline  - RTC 6  month    Vitamin D deficiency  - being replaced    Laryngitis  - better    Allergies  - sees Dr Robin Corbin MD

## 2020-06-04 NOTE — PROGRESS NOTES
Subjective:       Patient ID: Valdez Salazar is a 60 y.o. female.    Chief Complaint: Asthma (doing well, feels lke she knows has to manage it better. Still has issues with lot of mucus. Worse when eats. So still on tid allergy pill. )       Pt presents for chronic cough, rhinitis, dysphagia.     Other doctors in tx: Dr. Hart; Dr. Marroquin;  Dr. Corbin.     Non allergic rhinitis   Rhinitis: serum IgE - negative NON-allergic.   Condition: pnd improved/ stable   Her voice is hoarse but improving  ENT - Dr. Peterson states she is getting a nodule on her vocal cord. Was advised to increase water intake and to decrease force of cough due to nodule.   Pt states she stopped nasal sprays post her ENT visit.   Pt states being off the sprays, there really wasn't a change in her symptoms vs being on it.   Still using saline.     LPR:  Hoarseness of voice is improved   Was started on carafate and bid protonix x 6 weeks. Using zantac prn after dinner if she feels there are seasonings. Due to Zantac recall, pepcid.   Main issue is reflux that is causing mucus.   She can only get carafate in about twice per day. Seems to help.   A trigger has been bell peppers in a meal she ate and felt that the mucus filled up in her throat, she had ranitidine 150 mg that then calmed down the episode along with benadryl.   Pt states she needs to clear the mucus to breathe and cough. Zantac helps.   States everything she eats, can't breathe. Takes ranitidine - may take after eating, and benadryl-D that may help temporarily.   Pt states once she takes these meds she feels she can breathe.   She stopped the protonix 40 mg. Feels wasn't helping and took in the evening.   Does notice diet changes have improved symptoms.   She may take benadryl daily. This may increase mucus viscosity.   Dr. Marroquin- esophagus stretching may need to occur.   Getting food stuck few days per week with breads. 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   Pt notes onions,  "bellpeppers, heavily seasoned foods can exacerbate mucus.     Prior history   In the past she has tried omeprazole 40 mg BID, but Dr. farrar took her off this regimen.   Dr. Hart performed scope and dx with LPR and sent her to GI, Dr. Farrar. No vocal cord lesions at that time, but now may be developing due to constant throat clearing.     Cough:   Condition:stable   Trigger: weather changes.   Sx: coughing spells, thick mucus- main issue is the "thick mucus  "  Has symptoms despite the spirva and symbicort. Stopped spiriva .   Increased use of rescue inhaler. Given a spacer to see if this helps the voice.   Intensity: They can be bad enough to where she considers going to the ED- however she is 90 % improved.   Tx: started symbicort 80 1 puff bid-  increased to 160 2 puffs twice per day by Dr. Corbin,  spiriva 2 puff qday rx given 1 puff twice per day, mucinex D. Magic mouthwash qhs.  We had a trial off montelukast. finished medrol pack for orthopedics- 12/3/2019  -Rescue inhaler may or may not help, not a consistent efficacy. mucinex-D helps with thickness of mucus. Discussed D can increase her BP.   -She has been on an antibiotic in the past but isn't unsure of the name or type. - early June 2018. It helped the "color " of the mucus but hasn't had improvement in sx.   Trigger: when she eats, more mucus comes and she coughs. Doesn't matter if it's water or food.   Protonix 40 mg given for BID x 4 weeks then once daily. Thinks that it helped with the mucus. BID was a better formulation than qday. But pt has been inconsistent with this.     Duration: She has been having these symptoms for the past year or since 2019.    Voice is still improved off breo.   Added cranberry and lime at night.    Of note, Since she's been out of [facility], her symptoms have been bad.   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     Imaging:  Ct chest reviewed and no significant findings.   Sinus ct in 2015 reviewed and no " "significant findings.    Discussed possibly repeating as it has been 3 years since last scan.     Atopic history  Asthma: no history - but had a methacholine challenge- per patient report positive.   - stopped breo due to dysphonia. and montelukast was d/c. Now seeing Dr. Corbin.   Rhinitis: see above  Dysphagia: at times food gets stuck- see  Cara for reflux- needs f/u  Food allergy: none   Oral allergy: none       Review of Systems      General: neg unexpected weight changes, fevers, chills, night sweats, malaise  HEENT: see hpi, Neg eye pain, vision changes, ear drainage, nose bleeds, throat tightness, sores in the mouth  CV: Neg chest pain, palpitations, swelling  Resp: see hpi, neg shortness of breath, hemoptysis  GI: see hpi, neg night abdominal pain, chronic diarrhea, chronic constipation  Derm: See Hpi, neg new rash, neg flushing  Mu/sk: Neg joint pain, joint swelling   Psych: Neg anxiety  neuro: neg chronic headaches, muscle weakness  Endo: neg heat/cold intolerance, chronic fatigue    Objective:     Vitals:    06/04/20 0841   BP: 120/84   Pulse: 77   Temp: 98.1 °F (36.7 °C)   SpO2: 98%   Weight: 98.4 kg (217 lb)   Height: 5' 5" (1.651 m)        Physical Exam      General: no acute distress, well developed well nourished voice hoarseness     Assessment:       1. Chronic cough    2. Esophageal dysphagia    3. Laryngopharyngeal reflux (LPR)    4. Hoarseness    5. Chronic rhinitis    6. Dyspnea and respiratory abnormalities        Plan:       Chronic cough    Esophageal dysphagia    Laryngopharyngeal reflux (LPR)    Hoarseness    Chronic rhinitis  -     budesonide (PULMICORT) 0.5 mg/2 mL nebulizer solution; Instill into saline rinse 2 vials daily or nightly.  Dispense: 120 mL; Refill: 3    Dyspnea and respiratory abnormalities        Hoarseness  - improved until a week ago now exacerbated.   - symbicort 160 2 puffs bid- we may need to consider a pro drug alvesco to see if this helps with hoarseness. "   -continue magic mouthwash to help with throat irritation prn.   - carafate qid before meals and bedtime. Pt not to be so strict on timing to take and try to get in more than 2 pills per day.   stop ranitidine but 150 mg bid  Change to pepcid 20 mg bid.   protonix 40 bid x 6 weeks but pt wants to continue for 8 weeks to 3 months.   Defer to GI for reflux management.     Chronic rhinitis- inhalant panel quest - negative.   - saline, pt elects to stop nasal sprays   - technique reviewed today    - continue plain mucinex 1200 mg bid- discussed D or pseudophed has risk to increase her BP.   Limit the amount of dairy as it may increase mucus production.     Start budesonide 2 vials daily with xlear and skye med.     Dyspnea- reflux being treated, feels mucus is more of the trigger- medications are helping.   - try to limit pnd as a source, ipratopium prn   - discontinue 2 puffs spiriva qday- pt states coughing fits   - continue lpr management per gi   - pt to continue pulmonary management- positive methacholine challenge   - trial off montelukast to simplify her medication routine.     Lpr/dysphagia - reflux as an exacerbater.   - continue ppi -  pantoprazole 40 mg to BID- needs to decrease to help curb potential SE. Would like to extend to 3 months.   - esophagus stretching may need to occur with GI.   Spicy foods are temporally related to increased mucus. Especially bell pepper onion and garlic   - discussed to see Dr. Marroquin as food is getting stuck a few days per week.   - Dr. Marroquin may stretch esophagus.   - carafate qid before meals and bedtime.   -Continue pepcid 20 mg bid- but has low efficacy.   - protonix 40 bid x 6 weeks then qday- trial: done but wants extend to 3 months.     Reviewed CBC, Ige, and Vitamin D labs, pt to continue 5000 IU daily.   Highest eos 300.       Follow up in 6-12 months. Sooner if needed.     > 50 % spent in counseling and coordinating care.  Time spent with patient: 34  mins        Arabella Kelly M.D.  Allergy/Immunology  [facility]   [phone] phone  [phone] fax

## 2020-06-04 NOTE — PATIENT INSTRUCTIONS
Skye med rinse:   Distilled water to 8 oz - warm 10-15 seconds in ceramic mug then add to skye med bottle   Or rolling boil x 2 mins and let cool.   Then use 1 xlear packet     Then instill 2 vials of budesonide.     If burns, add baking soda.     In the shower  Hold breath and blow out while squeezing the bottle.   Once gone, done.     Do this about 4 weeks then if helpful, try every other day and use saline mist in the morning.       Since doing well, we can follow up in another 6 months, sooner if needed - in person or virtually with telemed.

## 2020-11-11 ENCOUNTER — OFFICE VISIT (OUTPATIENT)
Dept: PULMONOLOGY | Facility: CLINIC | Age: 60
End: 2020-11-11
Payer: COMMERCIAL

## 2020-11-11 DIAGNOSIS — R06.00 DYSPNEA AND RESPIRATORY ABNORMALITIES: ICD-10-CM

## 2020-11-11 DIAGNOSIS — R05.3 CHRONIC COUGH: ICD-10-CM

## 2020-11-11 DIAGNOSIS — E55.9 VITAMIN D DEFICIENCY: ICD-10-CM

## 2020-11-11 DIAGNOSIS — J45.40 MODERATE PERSISTENT ASTHMA, UNSPECIFIED WHETHER COMPLICATED: Primary | ICD-10-CM

## 2020-11-11 DIAGNOSIS — R06.89 DYSPNEA AND RESPIRATORY ABNORMALITIES: ICD-10-CM

## 2020-11-11 PROCEDURE — 99214 OFFICE O/P EST MOD 30 MIN: CPT | Mod: S$GLB,,, | Performed by: INTERNAL MEDICINE

## 2020-11-11 PROCEDURE — 99214 PR OFFICE/OUTPT VISIT, EST, LEVL IV, 30-39 MIN: ICD-10-PCS | Mod: S$GLB,,, | Performed by: INTERNAL MEDICINE

## 2020-11-11 RX ORDER — BUDESONIDE AND FORMOTEROL FUMARATE DIHYDRATE 160; 4.5 UG/1; UG/1
2 AEROSOL RESPIRATORY (INHALATION) EVERY 12 HOURS
Qty: 30.6 INHALER | Refills: 3 | Status: SHIPPED | OUTPATIENT
Start: 2020-11-11 | End: 2020-12-02 | Stop reason: SDUPTHER

## 2020-11-11 NOTE — PROGRESS NOTES
"Office Visit    Patient Name: Valdez Salazar  MRN: [id number]  : 1960      Reason for visit: Asthma    HPI:     2018 - Pt with h/o asthma ( has seen Dr Bill in past), has trouble with dyspnea.  Has methacholine challenge test which was "positive".  Was on singulair and BREO, still had difficulties and "thick mucus".  Has trouble when reclining.  Has seen GI to evaluate for reflux ( had esophageal "stretched").  Was referred to Dr Kelly for evaluation and now here to establish care.  Hutchins also seen Dr Hart for ENT evaluation.  Had medication changes done by dr Kelly and feels better overall.    2018 - HAs been doing fine until last few days then she had some waking episodes.  This AM had some increased chest tightness and has used rescue inhaler more.  Has had some dysphagia and saw Dr Marroquin and was started on diflucan.  Feels better with regards with this.  Has not noted wheezing but does feel tight.    12/3/2018 - Here for follow up to have left knee replacement at [facility] this Friday, breathing is ok, has some cough and mucus.  After last vii she developed laryngitis which has yet to clear (? If related to her Symbicort) - she is to see Dr Peterson tomorrow AM.    Preoperative Pulmonary Clearance    Pt was seen for preoperative clearance from a pulmonary standpoint for planned left polly replacement.  The risks of the procedure have been discussed with the patient including the risk of prolonged ventilatory support/difficulty weaning from ventilator, post-procedure pneumonia, post-procedure respiratory failure and DVT/pulmonary embolism.  The pt is currently stable from their respiratory status and they are cleared for the planned procedure at increased risk.  The risk should not be considered prohibitive.  If you have any questions please contact me.  This clearance is pending evaluation by ENT for persistent hoarseness.    2019 - Here for follow up, doing better overall.  Has seen ENT " "and was found to have a vocal cord nodule, is being treated for reflux.  Still with significant vocal issues.  Mucus has been better.  Feels that asthma control is better and cough is much better than where it was.    5/28/2019 - Here for follow up.  Asthma control has better.  Hoarseness has been better - she has found that having a small cocktail daily had helped with her symptoms.  She tells me that her vocal cord nodule is "better".  Cough is much better. No issues with her medications but does have some concerns that her vocal issues may be related to her inhalers.    7/25/2019 - Was in ER 7/6 with pleurisy (ER note reviewed) - had CTA - negative but had finding of gallstones.   Continues with URI symptoms, post nasal drip.  Has some right arm discomfort (some soreness to right deltoid muscle).  Has some mucus at times which is clear.  Has not noted reflux symptoms (still taking meds).  Asthma control has been OK overall.  Gave her Dr Comer's     12/3/2019 - Here for follow up, since last visit she had cholecystectomy, found to have thyroid nodule and was told that it was benign.  Asthma control has been good overall - does note some cough after taking her symbicort at night.  Has had a head cold recently - better now.    6/4/2020 - Here for follow up, overall asthma control has been OK.  She has no known exposure to corona virus and has been practicing social distancing.  Pt asthma is currently stable with no increases in SOB, KAHN or wheezing.  There has been no increase in use of  rescue medications.  Have reviewed medications with pt including the roles of rescue and controlling medications.  All questions answered.  Pt reports no problems with technique with inhalers.  We discussed the possibility of de-escalation of therapy if asthma control remains stable.  Does have some occasional thick mucus.    11/11/2020 - Here for follow up, overall has been doing well, some increasede symptoms with weather " changes.  Pt asthma is currently stable with no increases in SOB, KAHN or wheezing.  There has been no increase in use of  rescue medications.  Have reviewed medications with pt including the roles of rescue and controlling medications.  All questions answered.  Pt reports no problems with technique with inhalers.  We discussed the possibility of de-escalation of therapy if asthma control remains stable.  Patient has no known corona virus exposures and has been practicing social distancing.  We have discussed the virus and precautions and all questions have been answered.      Asthma Flowsheet    Asthma -  Moderate  Persistent       Controlled    ACT - 21    Baseline PFT - FEV1- 95 %    Serum eosinophil count - 300  Serum IgE - < 2    Allergy testing - na   Desensitization - no    Therapy: ICS/LABA/LAMA +      PRN albuterol +                 Singulair                  Xolair                  Nucala                  Cinqair       Past Medical History    Past Medical History:   Diagnosis Date    Acid reflux     Allergic sinusitis     Arthritis     knees, hands    Asthma 2017    dr finn    Carpal tunnel syndrome on left     no surg yet    Gall stones 2019    found with lung w/o-- numerous stones-- surg scheduled    Hoarseness     Jaw disease     occ jaw locks    Low iron     Migraines     rare now    Numbness     right hand-     Phobia     of needles-- hard to find iv's    PONV (postoperative nausea and vomiting)     Thyroid disease     trouble swallowing-- being w/o    Vocal cord nodules        Past Surgical History    Past Surgical History:   Procedure Laterality Date    ADENOIDECTOMY      CARPAL TUNNEL RELEASE Right      SECTION      COLONOSCOPY  2013    ELBOW SURGERY Left     and hardware removal    HYSTERECTOMY      JOINT REPLACEMENT  2018    LAPAROSCOPIC CHOLECYSTECTOMY N/A 10/25/2019    Procedure: CHOLECYSTECTOMY, LAPAROSCOPIC;  Surgeon: Douglas Quiroz III, MD;   Location: [facility] OR;  Service: General;  Laterality: N/A;    REPAIR OF RECTOCELE      TONSILLECTOMY      TOTAL KNEE ARTHROPLASTY Left 12/7/2018    Procedure: ARTHROPLASTY, KNEE, TOTAL;  Surgeon: Claude S. Williams IV, MD;  Location: [facility] OR;  Service: Orthopedics;  Laterality: Left;       Medications      Current Outpatient Medications:     (Magic mouthwash) 1:1:1 Benadryl 12.5mg/5ml liq, aluminum & magnesium hydroxide-simehticone (Maalox), lidocaine viscous 2%, Swish and spit 5 mLs every 4 (four) hours as needed (cough , sore throat.)., Disp: 300 mL, Rfl: 1    azelastine (ASTELIN) 137 mcg (0.1 %) nasal spray, 1 spray per nare twice per day, may use up to 4 times daily 1 spray per nare for congestion and post nasal drip, Disp: 90 mL, Rfl: 4    azithromycin (ZITHROMAX Z-CHRISTIAN) 250 MG tablet, Take 2 po today then 1 a day for 4 days, Disp: 6 tablet, Rfl: 0    budesonide (PULMICORT) 0.5 mg/2 mL nebulizer solution, INSTILL INTO SALINE RINSE 2 VIALS DAILY OR NIGHTLY., Disp: 360 mL, Rfl: 1    diphenhydrAMINE (BENADRYL) 25 mg capsule, Take 25 mg by mouth every 6 (six) hours as needed for Itching., Disp: , Rfl:     famotidine (PEPCID) 20 MG tablet, Take 1 tablet (20 mg total) by mouth 2 (two) times daily., Disp: 60 tablet, Rfl: 11    inhalat. spacing dev,sm. mask (AEROCHAMBER PLUS FLOW-VU,S MSK) Spcr, 1 Device by Misc.(Non-Drug; Combo Route) route as needed., Disp: 1 each, Rfl: 3    pantoprazole (PROTONIX) 40 MG tablet, Take 40 mg by mouth 2 (two) times daily., Disp: , Rfl:     pantoprazole (PROTONIX) 40 MG tablet, TAKE 1 TABLET BY MOUTH TWICE A DAY, Disp: 180 tablet, Rfl: 3    predniSONE (DELTASONE) 10 MG tablet, Take 3 a day x 3 days then 2 a day x 3 days then 1 a day x 3 days, Disp: 18 tablet, Rfl: 0    sucralfate (CARAFATE) 1 gram tablet, TAKE 1 TABLET BY MOUTH 4 TIMES A DAY BEFORE MEALS AND NIGHTLY, Disp: 360 tablet, Rfl: 0    SYMBICORT 160-4.5 mcg/actuation HFAA, INHALE 2 PUFFS INTO THE LUNGS EVERY 12  HOURS., Disp: 30.6 Inhaler, Rfl: 6    traMADol (ULTRAM) 50 mg tablet, Take 1 tablet (50 mg total) by mouth every 4 (four) hours as needed for Pain., Disp: 30 tablet, Rfl:     valACYclovir (VALTREX) 1000 MG tablet, Take 1,000 mg by mouth daily as needed. , Disp: , Rfl: 3    albuterol (PROVENTIL/VENTOLIN HFA) 90 mcg/actuation inhaler, INHALE 2 PUFFS INTO THE LUNGS EVERY 4 TO 6 HOURS AS NEEDED FOR WHEEZING. RESCUE, Disp: 8.5 Inhaler, Rfl: 6    Allergies    Review of patient's allergies indicates:   Allergen Reactions    Codeine Nausea And Vomiting    Stadol [butorphanol tartrate] Shortness Of Breath     Pt reports stops breathing    Wiley-dur Shortness Of Breath     Stops breathing    Morphine Nausea And Vomiting     Severe.      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   HENT: Positive for congestion. Negative for nosebleeds.         + hoarseness   Eyes: Negative for pain.   Respiratory: Positive for cough. Negative for hemoptysis, sputum production, shortness of breath, wheezing and stridor.    Cardiovascular: Negative for chest pain, palpitations, orthopnea, claudication, leg swelling and PND.   Gastrointestinal: Positive for heartburn. Negative for abdominal pain, blood in stool, constipation, diarrhea,  melena, nausea and vomiting.   Genitourinary: Negative for dysuria, flank pain, frequency, hematuria and urgency.   Musculoskeletal: Negative for falls and myalgias.   Skin: Negative for itching and rash.   Neurological: Negative for sensory change, focal weakness and weakness.   Psychiatric/Behavioral: Negative for depression. The patient is not nervous/anxious.        Physical Exam    Vitals:    11/11/20 1125   BP: (P) 122/74   BP Location: (P) Left arm   Patient Position: (P) Sitting   BP Method: (P) Large (Manual)   Pulse: (P) 75   Temp: (P) 97.3 °F (36.3 °C)   TempSrc: (P) Temporal   SpO2: (P) 97%   Weight: (P) 104.8 kg (231 lb)       Physical Exam  Vitals signs and nursing note reviewed.   Constitutional:       General: She is not in acute distress.     Appearance: She is well-developed. She is not diaphoretic.   HENT:      Head: Normocephalic and atraumatic.      Right Ear: External ear normal.      Left Ear: External ear normal.      Nose: Nose normal.   Eyes:      Conjunctiva/sclera: Conjunctivae normal.      Pupils: Pupils are equal, round, and reactive to light.   Neck:      Musculoskeletal: Normal range of motion and neck supple.      Thyroid: No thyromegaly.      Vascular: No JVD.      Trachea: No tracheal deviation.   Cardiovascular:      Rate and Rhythm: Normal rate and regular rhythm.      Heart sounds: Normal heart sounds. No murmur. No friction rub. No gallop.    Pulmonary:      Effort: Pulmonary effort is normal. No respiratory distress.      Breath sounds: Normal breath sounds. No stridor. No wheezing or rales.   Chest:      Chest wall: No tenderness.   Abdominal:      General: Bowel sounds are normal. There is no distension.      Palpations: Abdomen is soft.      Tenderness: There is no abdominal tenderness.   Musculoskeletal: Normal range of motion.         General: No tenderness.   Lymphadenopathy:      Cervical: No cervical adenopathy.   Skin:     General: Skin is warm and dry.    Neurological:      Mental Status: She is alert and oriented to person, place, and time.      Cranial Nerves: No cranial nerve deficit.   Psychiatric:         Behavior: Behavior normal.         Labs    Lab Results   Component Value Date    WBC 6.61 10/21/2019    HGB 13.7 10/21/2019    HCT 43.0 10/21/2019     10/21/2019       Sodium   Date Value Ref Range Status   10/21/2019 141 136 - 145 mmol/L Final     Potassium   Date Value Ref Range Status   10/21/2019 4.1 3.5 - 5.1 mmol/L Final     Chloride   Date Value Ref Range Status   10/21/2019 105 95 - 110 mmol/L Final     CO2   Date Value Ref Range Status   10/21/2019 27 23 - 29 mmol/L Final     Glucose   Date Value Ref Range Status   10/21/2019 94 70 - 110 mg/dL Final     BUN   Date Value Ref Range Status   10/21/2019 17 8 - 23 mg/dL Final     Creatinine   Date Value Ref Range Status   10/21/2019 0.6 0.5 - 1.4 mg/dL Final   07/29/2013 0.7 0.5 - 1.4 mg/dL Final     Calcium   Date Value Ref Range Status   10/21/2019 9.5 8.7 - 10.5 mg/dL Final   07/29/2013 9.1 8.7 - 10.5 mg/dL Final     Total Protein   Date Value Ref Range Status   10/21/2019 7.1 6.0 - 8.4 g/dL Final     Albumin   Date Value Ref Range Status   10/21/2019 4.4 3.5 - 5.2 g/dL Final     Total Bilirubin   Date Value Ref Range Status   10/21/2019 0.6 0.1 - 1.0 mg/dL Final     Comment:     For infants and newborns, interpretation of results should be based  on gestational age, weight and in agreement with clinical  observations.  Premature Infant recommended reference ranges:  Up to 24 hours.............<8.0 mg/dL  Up to 48 hours............<12.0 mg/dL  3-5 days..................<15.0 mg/dL  6-29 days.................<15.0 mg/dL       Alkaline Phosphatase   Date Value Ref Range Status   10/21/2019 60 55 - 135 U/L Final   07/29/2013 79 55 - 135 U/L Final     AST   Date Value Ref Range Status   10/21/2019 17 10 - 40 U/L Final   07/29/2013 17 10 - 40 U/L Final     ALT   Date Value Ref Range Status    10/21/2019 19 10 - 44 U/L Final     Anion Gap   Date Value Ref Range Status   10/21/2019 9 8 - 16 mmol/L Final   07/29/2013 13 5 - 15 meq/L Final       Xrays        Impression/Plan    Problem List Items Addressed This Visit        ENT    Chronic rhinitis  - continue present treatments  - seems better       Pulmonary    Chronic cough  - multifactorial  - better overall       GI    Laryngopharyngeal reflux (LPR)  - being treated      Other Visit Diagnoses     Asthma, unspecified asthma severity, unspecified whether complicated, unspecified whether persistent  - stable and at baseline  - continue symbicort but having some cost issues  - RTC 6 month    Vitamin D deficiency  - being replaced    Laryngitis  - better    Allergies  - sees Dr Robin Corbin MD

## 2020-12-02 ENCOUNTER — OFFICE VISIT (OUTPATIENT)
Dept: ALLERGY | Facility: CLINIC | Age: 60
End: 2020-12-02
Payer: COMMERCIAL

## 2020-12-02 ENCOUNTER — OFFICE VISIT (OUTPATIENT)
Dept: URGENT CARE | Facility: CLINIC | Age: 60
End: 2020-12-02
Payer: COMMERCIAL

## 2020-12-02 ENCOUNTER — TELEPHONE (OUTPATIENT)
Dept: ALLERGY | Facility: CLINIC | Age: 60
End: 2020-12-02

## 2020-12-02 VITALS
HEART RATE: 78 BPM | SYSTOLIC BLOOD PRESSURE: 120 MMHG | DIASTOLIC BLOOD PRESSURE: 56 MMHG | OXYGEN SATURATION: 97 % | HEIGHT: 65 IN | TEMPERATURE: 98 F | BODY MASS INDEX: 37.99 KG/M2 | WEIGHT: 228 LBS

## 2020-12-02 DIAGNOSIS — Z03.818 ENCOUNTER FOR OBSERVATION FOR SUSPECTED EXPOSURE TO OTHER BIOLOGICAL AGENTS RULED OUT: ICD-10-CM

## 2020-12-02 DIAGNOSIS — R06.89 DYSPNEA AND RESPIRATORY ABNORMALITIES: ICD-10-CM

## 2020-12-02 DIAGNOSIS — J45.40 MODERATE PERSISTENT ASTHMA, UNSPECIFIED WHETHER COMPLICATED: ICD-10-CM

## 2020-12-02 DIAGNOSIS — J31.0 NON-ALLERGIC RHINITIS: ICD-10-CM

## 2020-12-02 DIAGNOSIS — R06.00 DYSPNEA AND RESPIRATORY ABNORMALITIES: ICD-10-CM

## 2020-12-02 DIAGNOSIS — R13.19 ESOPHAGEAL DYSPHAGIA: ICD-10-CM

## 2020-12-02 DIAGNOSIS — R05.3 CHRONIC COUGH: Primary | ICD-10-CM

## 2020-12-02 DIAGNOSIS — R49.0 VOICE HOARSENESS: ICD-10-CM

## 2020-12-02 PROCEDURE — 99204 PR OFFICE/OUTPT VISIT, NEW, LEVL IV, 45-59 MIN: ICD-10-PCS | Mod: S$GLB,,, | Performed by: EMERGENCY MEDICINE

## 2020-12-02 PROCEDURE — 99204 OFFICE O/P NEW MOD 45 MIN: CPT | Mod: S$GLB,,, | Performed by: EMERGENCY MEDICINE

## 2020-12-02 PROCEDURE — 3008F BODY MASS INDEX DOCD: CPT | Mod: S$GLB,,, | Performed by: ALLERGY & IMMUNOLOGY

## 2020-12-02 PROCEDURE — 99214 OFFICE O/P EST MOD 30 MIN: CPT | Mod: S$GLB,,, | Performed by: ALLERGY & IMMUNOLOGY

## 2020-12-02 PROCEDURE — 3008F PR BODY MASS INDEX (BMI) DOCUMENTED: ICD-10-PCS | Mod: S$GLB,,, | Performed by: ALLERGY & IMMUNOLOGY

## 2020-12-02 PROCEDURE — U0003 INFECTIOUS AGENT DETECTION BY NUCLEIC ACID (DNA OR RNA); SEVERE ACUTE RESPIRATORY SYNDROME CORONAVIRUS 2 (SARS-COV-2) (CORONAVIRUS DISEASE [COVID-19]), AMPLIFIED PROBE TECHNIQUE, MAKING USE OF HIGH THROUGHPUT TECHNOLOGIES AS DESCRIBED BY CMS-2020-01-R: HCPCS

## 2020-12-02 PROCEDURE — 99214 PR OFFICE/OUTPT VISIT, EST, LEVL IV, 30-39 MIN: ICD-10-PCS | Mod: S$GLB,,, | Performed by: ALLERGY & IMMUNOLOGY

## 2020-12-02 RX ORDER — BUDESONIDE AND FORMOTEROL FUMARATE DIHYDRATE 160; 4.5 UG/1; UG/1
2 AEROSOL RESPIRATORY (INHALATION) EVERY 12 HOURS
Qty: 30.6 INHALER | Refills: 3 | Status: SHIPPED | OUTPATIENT
Start: 2020-12-02 | End: 2021-11-14

## 2020-12-02 NOTE — TELEPHONE ENCOUNTER
Pt called back to let us know her  tested positive for COVID today after she was here for an appt.  I instructed her to quarantine for 14 days and try to keep her distance.  She will let us, the pulmonologist, and/or her PCP know if she develops any s/sx.

## 2020-12-02 NOTE — PROGRESS NOTES
Subjective:       Patient ID: Valdez Salazar is a 60 y.o. female.    Vitals:  vitals were not taken for this visit.     Chief Complaint: No chief complaint on file.    Presents for covid testing  Denies complaints  Exposed to positive patient, her       Constitution: Negative for chills and fever.   HENT: Negative for congestion and sore throat.    Neck: Negative for painful lymph nodes.   Respiratory: Negative for cough.    Gastrointestinal: Negative for vomiting and diarrhea.   Musculoskeletal: Negative for muscle ache.   Skin: Negative for rash.   Neurological: Negative for headaches and seizures.   Hematologic/Lymphatic: Negative for swollen lymph nodes.       Objective:      Physical Exam   Constitutional: She is oriented to person, place, and time. She appears well-developed.   HENT:   Head: Normocephalic and atraumatic.   Mouth/Throat: Oropharynx is clear and moist.   Eyes: Pupils are equal, round, and reactive to light. Conjunctivae and EOM are normal.   Neck: Normal range of motion. Neck supple.   Cardiovascular: Normal rate, regular rhythm and normal heart sounds.   Pulmonary/Chest: Effort normal and breath sounds normal.   Musculoskeletal: Normal range of motion.   Neurological: She is alert and oriented to person, place, and time.   Skin: Skin is warm and dry. Psychiatric: Her behavior is normal.   Nursing note and vitals reviewed.        Assessment:       1. Encounter for observation for suspected exposure to other biological agents ruled out        Plan:         Encounter for observation for suspected exposure to other biological agents ruled out  -     Cancel: COVID-19 Routine Screening  -     COVID-19 Routine Screening

## 2020-12-02 NOTE — PROGRESS NOTES
Subjective:       Patient ID: Valdez Salazar is a 60 y.o. female.    Chief Complaint: Allergic Rhinitis  (been stable) and Cough (been fine, mask and heater bothers her)       Pt presents for chronic cough, rhinitis, dysphagia.     Other doctors in tx: Dr. Hart; Dr. Marroquin;  Dr. Corbin.     Non allergic rhinitis   Rhinitis: serum IgE - negative NON-allergic.   Condition: pnd improved/ stable   Her voice is hoarse but improving felt to be due to LPR/nodule.   ENT - Dr. Peterson states she is getting a nodule on her vocal cord. Was advised to increase water intake and to decrease force of cough due to nodule.   Pt states she stopped nasal sprays post her ENT visit.   Pt states being off the sprays, there really wasn't a change in her symptoms vs being on it.   Still using saline.     June 2020 tried budesonide and saline but didn't tolerate it.     LPR:  Hoarseness of voice is improved   Was started on carafate and bid protonix x 6 weeks. Using zantac prn after dinner if she feels there are seasonings. Due to Zantac recall, pepcid.   Main issue is reflux that is causing mucus.   She can only get carafate in about twice per day. Seems to help.   A trigger has been bell peppers in a meal she ate and felt that the mucus filled up in her throat, she had ranitidine 150 mg that then calmed down the episode along with benadryl.   Pt states she needs to clear the mucus to breathe and cough. Zantac helps.   States everything she eats, can't breathe. Takes ranitidine - may take after eating, and benadryl-D that may help temporarily.   Pt states once she takes these meds she feels she can breathe.   She stopped the protonix 40 mg. Feels wasn't helping and took in the evening.   Does notice diet changes have improved symptoms.   She may take benadryl daily. This may increase mucus viscosity.   Dr. Marroqiun- esophagus stretching may need to occur.   Getting food stuck few days per week with breads. 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   Pt notes onions, bellpeppers, heavily seasoned foods can exacerbate mucus.     Prior history   In the past she has tried omeprazole 40 mg BID, but Dr. farrar took her off this regimen.   Dr. Hart performed scope and dx with LPR and sent her to GI, Dr. Farrar. No vocal cord lesions at that time, but now may be developing due to constant throat clearing.     Cough:   Condition:stable   Trigger: weather changes.   Sx: coughing spells, thick mucus- main issue is the "thick mucus  "  Likes symbicort 160 mcg and has controlled her breathing. Has been doing well since on it.     Has symptoms despite the spirva and symbicort. Stopped spiriva .   Increased use of rescue inhaler. Given a spacer to see if this helps the voice.   Intensity: They can be bad enough to where she considers going to the ED- however she is 90 % improved.   Tx: started symbicort 80 1 puff bid-  increased to 160 2 puffs twice per day by Dr. Corbin,  spiriva 2 puff qday rx given 1 puff twice per day, mucinex D. Magic mouthwash qhs.  We had a trial off montelukast. finished medrol pack for orthopedics- 12/3/2019  -Rescue inhaler may or may not help, not a consistent efficacy. mucinex-D helps with thickness of mucus. Discussed D can increase her BP.   -She has been on an antibiotic in the past but isn't unsure of the name or type. - early June 2018. It helped the "color " of the mucus but hasn't had improvement in sx.   Trigger: when she eats, more mucus comes and she coughs. Doesn't matter if it's water or food.   Protonix 40 mg given for BID x 4 weeks then once daily. Thinks that it helped with the mucus. BID was a better formulation than qday. But pt has been inconsistent with this.     Duration: She has been having these symptoms for the past year or since 2019.    Voice is still improved off breo.   Added cranberry and lime at night.    Of note, Since she's been out of [facility], her symptoms have been bad.   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     Imaging:  Ct chest reviewed and no significant findings.   Sinus ct in 2015 reviewed and no significant findings.    Discussed possibly repeating as it has been 3 years since last scan.     Atopic history  Asthma: no history - but had a methacholine challenge- per patient report positive.   - stopped breo due to dysphonia. and montelukast was d/c. Now seeing Dr. Corbin.   Rhinitis: see above  Dysphagia: at times food gets stuck- see  Cara for reflux- needs f/u  Food allergy: none   Oral allergy: none       Review of Systems      General: neg unexpected weight changes, fevers, chills, night sweats, malaise  HEENT: see hpi, Neg eye pain, vision changes, ear drainage, nose bleeds, throat tightness, sores in the mouth  CV: Neg chest pain, palpitations, swelling  Resp: see hpi, neg shortness of breath, hemoptysis  GI: see hpi, neg night abdominal pain, chronic diarrhea, chronic constipation  Derm: See Hpi, neg new rash, neg flushing  Mu/sk: Neg joint pain, joint swelling   Psych: Neg anxiety  neuro: neg chronic headaches, muscle weakness  Endo: neg heat/cold intolerance, chronic fatigue    Objective:     Vitals:    12/02/20 0843   BP: (!) 120/56   Pulse: 78   Temp: 97.5 °F (36.4 °C)   SpO2: 97%   Weight: 103.4 kg (228 lb)   Height: 5' 5" (1.651 m)        Physical Exam    General: no acute distress, well developed well nourished       Assessment:       1. Chronic cough    2. Esophageal dysphagia    3. Voice hoarseness    4. Non-allergic rhinitis    5. Dyspnea and respiratory abnormalities    6. Moderate persistent asthma, unspecified whether complicated        Plan:       Chronic cough    Esophageal dysphagia    Voice hoarseness    Non-allergic rhinitis    Dyspnea and respiratory abnormalities    Moderate persistent asthma, unspecified whether complicated  -     budesonide-formoterol 160-4.5 mcg (SYMBICORT) 160-4.5 mcg/actuation HFAA; Inhale 2 puffs into the lungs every 12 " (twelve) hours. Controller  Dispense: 30.6 Inhaler; Refill: 3        Hoarseness  - symbicort 160 2 puffs bid- we may need to consider a pro drug alvesco to see if this helps with hoarseness.   Hoarseness is improved.   -continue magic mouthwash to help with throat irritation prn.   - carafate qid before meals and bedtime. Pt not to be so strict on timing to take and try to get in more than 2 pills per day.   stopped ranitidine 150 mg bid 6/2020  Changed to pepcid 20 mg bid. 6/2020  protonix 40 bid x 6 weeks but pt wants to continue for 8 weeks to 3 months. 6/2020  Defer to GI for reflux management.     Non allergic rhinitis- inhalant panel quest - negative.   - saline, pt elects to stop nasal sprays 6/2020  - technique reviewed today    - continue plain mucinex 1200 mg bid- discussed D or pseudophed has risk to increase her BP.  Limit the amount of dairy as it may increase mucus production.     Started budesonide 2 vials daily with xlear and skye med. 6/2020    Dyspnea- reflux being treated, feels mucus is more of the trigger- medications are helping.   - try to limit pnd as a source, ipratopium prn   - discontinued 2 puffs spiriva qday- pt states coughing fits 6/2020  - continue lpr management per gi   - pt to continue pulmonary management- positive methacholine challenge   - trial off montelukast to simplify her medication routine.     Lpr/dysphagia - reflux as an exacerbater.   - continue ppi -  pantoprazole 40 mg to BID- needs to decrease to help curb potential SE. Would like to extend to 3 months. 6/2020  - esophagus stretching may need to occur with GI.   Spicy foods are temporally related to increased mucus. Especially bell pepper onion and garlic   - discussed to see Dr. Marroquin as food is getting stuck a few days per week.   - Dr. Marroquin may stretch esophagus.   - carafate qid before meals and bedtime.   -Continue pepcid 20 mg bid- but has low efficacy.   - protonix 40 bid x 6 weeks then qday- trial: done  but wants extend to 3 months.     Reviewed CBC, Ige, and Vitamin D labs, pt to continue 5000 IU daily.   Highest eos 300.       Follow up in 6-12 months. Sooner if needed.     > 50 % spent in counseling and coordinating care. Discussing covid and precautions and rec flu vaccination.   Time spent with patient: 25 mins        Arabella Kelly M.D.  Allergy/Immunology  [facility]   [phone] phone  [phone] fax

## 2020-12-02 NOTE — PATIENT INSTRUCTIONS
[id number] ([phone]) Mail Your Application. [employer] [employer] Program [address] [place] [address]    I think it will be ok to switch to generic symbicort.     Try az and me and we will try to help as well.     Continue current regimen.     Continue to keep clean.

## 2020-12-03 LAB — SARS-COV-2 RNA RESP QL NAA+PROBE: NOT DETECTED

## 2021-03-09 ENCOUNTER — TELEPHONE (OUTPATIENT)
Dept: PULMONOLOGY | Facility: CLINIC | Age: 61
End: 2021-03-09

## 2021-03-11 ENCOUNTER — IMMUNIZATION (OUTPATIENT)
Dept: PRIMARY CARE CLINIC | Facility: CLINIC | Age: 61
End: 2021-03-11
Payer: COMMERCIAL

## 2021-03-11 DIAGNOSIS — Z23 NEED FOR VACCINATION: Primary | ICD-10-CM

## 2021-03-11 PROCEDURE — 0001A COVID-19, MRNA, LNP-S, PF, 30 MCG/0.3 ML DOSE VACCINE: ICD-10-PCS | Mod: CV19,S$GLB,, | Performed by: FAMILY MEDICINE

## 2021-03-11 PROCEDURE — 91300 COVID-19, MRNA, LNP-S, PF, 30 MCG/0.3 ML DOSE VACCINE: ICD-10-PCS | Mod: S$GLB,,, | Performed by: FAMILY MEDICINE

## 2021-03-11 PROCEDURE — 0001A COVID-19, MRNA, LNP-S, PF, 30 MCG/0.3 ML DOSE VACCINE: CPT | Mod: CV19,S$GLB,, | Performed by: FAMILY MEDICINE

## 2021-03-11 PROCEDURE — 91300 COVID-19, MRNA, LNP-S, PF, 30 MCG/0.3 ML DOSE VACCINE: CPT | Mod: S$GLB,,, | Performed by: FAMILY MEDICINE

## 2021-04-01 ENCOUNTER — IMMUNIZATION (OUTPATIENT)
Dept: PRIMARY CARE CLINIC | Facility: CLINIC | Age: 61
End: 2021-04-01
Payer: COMMERCIAL

## 2021-04-01 DIAGNOSIS — Z23 NEED FOR VACCINATION: Primary | ICD-10-CM

## 2021-04-01 PROCEDURE — 91300 COVID-19, MRNA, LNP-S, PF, 30 MCG/0.3 ML DOSE VACCINE: CPT | Mod: S$GLB,,, | Performed by: FAMILY MEDICINE

## 2021-04-01 PROCEDURE — 0002A COVID-19, MRNA, LNP-S, PF, 30 MCG/0.3 ML DOSE VACCINE: ICD-10-PCS | Mod: CV19,S$GLB,, | Performed by: FAMILY MEDICINE

## 2021-04-01 PROCEDURE — 91300 COVID-19, MRNA, LNP-S, PF, 30 MCG/0.3 ML DOSE VACCINE: ICD-10-PCS | Mod: S$GLB,,, | Performed by: FAMILY MEDICINE

## 2021-04-01 PROCEDURE — 0002A COVID-19, MRNA, LNP-S, PF, 30 MCG/0.3 ML DOSE VACCINE: CPT | Mod: CV19,S$GLB,, | Performed by: FAMILY MEDICINE

## 2021-05-13 ENCOUNTER — OFFICE VISIT (OUTPATIENT)
Dept: PULMONOLOGY | Facility: CLINIC | Age: 61
End: 2021-05-13
Payer: COMMERCIAL

## 2021-05-13 VITALS
HEART RATE: 72 BPM | BODY MASS INDEX: 38.44 KG/M2 | WEIGHT: 231 LBS | SYSTOLIC BLOOD PRESSURE: 118 MMHG | OXYGEN SATURATION: 97 % | DIASTOLIC BLOOD PRESSURE: 62 MMHG

## 2021-05-13 DIAGNOSIS — E04.1 THYROID NODULE: ICD-10-CM

## 2021-05-13 DIAGNOSIS — R06.89 DYSPNEA AND RESPIRATORY ABNORMALITIES: ICD-10-CM

## 2021-05-13 DIAGNOSIS — Z12.31 ENCOUNTER FOR SCREENING MAMMOGRAM FOR BREAST CANCER: ICD-10-CM

## 2021-05-13 DIAGNOSIS — Z12.31 SCREENING MAMMOGRAM FOR HIGH-RISK PATIENT: ICD-10-CM

## 2021-05-13 DIAGNOSIS — R05.3 CHRONIC COUGH: ICD-10-CM

## 2021-05-13 DIAGNOSIS — R06.00 DYSPNEA AND RESPIRATORY ABNORMALITIES: ICD-10-CM

## 2021-05-13 DIAGNOSIS — J45.40 MODERATE PERSISTENT ASTHMA, UNSPECIFIED WHETHER COMPLICATED: ICD-10-CM

## 2021-05-13 DIAGNOSIS — Z12.39 ENCOUNTER FOR SCREENING FOR MALIGNANT NEOPLASM OF BREAST, UNSPECIFIED SCREENING MODALITY: ICD-10-CM

## 2021-05-13 DIAGNOSIS — J31.0 CHRONIC RHINITIS: Primary | ICD-10-CM

## 2021-05-13 PROCEDURE — 99214 PR OFFICE/OUTPT VISIT, EST, LEVL IV, 30-39 MIN: ICD-10-PCS | Mod: S$GLB,,, | Performed by: INTERNAL MEDICINE

## 2021-05-13 PROCEDURE — 3008F BODY MASS INDEX DOCD: CPT | Mod: S$GLB,,, | Performed by: INTERNAL MEDICINE

## 2021-05-13 PROCEDURE — 99214 OFFICE O/P EST MOD 30 MIN: CPT | Mod: S$GLB,,, | Performed by: INTERNAL MEDICINE

## 2021-05-13 PROCEDURE — 3008F PR BODY MASS INDEX (BMI) DOCUMENTED: ICD-10-PCS | Mod: S$GLB,,, | Performed by: INTERNAL MEDICINE

## 2021-05-18 ENCOUNTER — HOSPITAL ENCOUNTER (OUTPATIENT)
Dept: RADIOLOGY | Facility: HOSPITAL | Age: 61
Discharge: HOME | End: 2021-05-18
Attending: INTERNAL MEDICINE
Payer: COMMERCIAL

## 2021-05-18 DIAGNOSIS — Z12.31 SCREENING MAMMOGRAM FOR HIGH-RISK PATIENT: ICD-10-CM

## 2021-05-18 DIAGNOSIS — Z12.39 ENCOUNTER FOR SCREENING FOR MALIGNANT NEOPLASM OF BREAST, UNSPECIFIED SCREENING MODALITY: ICD-10-CM

## 2021-05-18 PROCEDURE — 77067 SCR MAMMO BI INCL CAD: CPT | Mod: TC,PO

## 2021-05-19 ENCOUNTER — LAB VISIT (OUTPATIENT)
Dept: LAB | Facility: HOSPITAL | Age: 61
End: 2021-05-19
Attending: INTERNAL MEDICINE
Payer: COMMERCIAL

## 2021-05-19 DIAGNOSIS — J45.40 MODERATE PERSISTENT ASTHMA, UNSPECIFIED WHETHER COMPLICATED: ICD-10-CM

## 2021-05-19 DIAGNOSIS — E04.1 THYROID NODULE: ICD-10-CM

## 2021-05-19 LAB
ALBUMIN SERPL BCP-MCNC: 4.1 G/DL (ref 3.5–5.2)
ALP SERPL-CCNC: 67 U/L (ref 55–135)
ALT SERPL W/O P-5'-P-CCNC: 21 U/L (ref 10–44)
ANION GAP SERPL CALC-SCNC: 6 MMOL/L (ref 8–16)
AST SERPL-CCNC: 19 U/L (ref 10–40)
BASOPHILS # BLD AUTO: 0.05 K/UL (ref 0–0.2)
BASOPHILS NFR BLD: 0.8 % (ref 0–1.9)
BILIRUB SERPL-MCNC: 0.8 MG/DL (ref 0.1–1)
BUN SERPL-MCNC: 12 MG/DL (ref 8–23)
CALCIUM SERPL-MCNC: 9.4 MG/DL (ref 8.7–10.5)
CHLORIDE SERPL-SCNC: 107 MMOL/L (ref 95–110)
CO2 SERPL-SCNC: 28 MMOL/L (ref 23–29)
CREAT SERPL-MCNC: 0.6 MG/DL (ref 0.5–1.4)
DIFFERENTIAL METHOD: NORMAL
EOSINOPHIL # BLD AUTO: 0.2 K/UL (ref 0–0.5)
EOSINOPHIL NFR BLD: 3.1 % (ref 0–8)
ERYTHROCYTE [DISTWIDTH] IN BLOOD BY AUTOMATED COUNT: 13.4 % (ref 11.5–14.5)
EST. GFR  (AFRICAN AMERICAN): >60 ML/MIN/1.73 M^2
EST. GFR  (NON AFRICAN AMERICAN): >60 ML/MIN/1.73 M^2
GLUCOSE SERPL-MCNC: 104 MG/DL (ref 70–110)
HCT VFR BLD AUTO: 41.1 % (ref 37–48.5)
HGB BLD-MCNC: 13.2 G/DL (ref 12–16)
IMM GRANULOCYTES # BLD AUTO: 0.02 K/UL (ref 0–0.04)
IMM GRANULOCYTES NFR BLD AUTO: 0.3 % (ref 0–0.5)
LYMPHOCYTES # BLD AUTO: 2.2 K/UL (ref 1–4.8)
LYMPHOCYTES NFR BLD: 36.8 % (ref 18–48)
MCH RBC QN AUTO: 29.3 PG (ref 27–31)
MCHC RBC AUTO-ENTMCNC: 32.1 G/DL (ref 32–36)
MCV RBC AUTO: 91 FL (ref 82–98)
MONOCYTES # BLD AUTO: 0.6 K/UL (ref 0.3–1)
MONOCYTES NFR BLD: 9.5 % (ref 4–15)
NEUTROPHILS # BLD AUTO: 2.9 K/UL (ref 1.8–7.7)
NEUTROPHILS NFR BLD: 49.5 % (ref 38–73)
NRBC BLD-RTO: 0 /100 WBC
PLATELET # BLD AUTO: 225 K/UL (ref 150–450)
PMV BLD AUTO: 10 FL (ref 9.2–12.9)
POTASSIUM SERPL-SCNC: 4.5 MMOL/L (ref 3.5–5.1)
PROT SERPL-MCNC: 7.3 G/DL (ref 6–8.4)
RBC # BLD AUTO: 4.5 M/UL (ref 4–5.4)
SODIUM SERPL-SCNC: 141 MMOL/L (ref 136–145)
TSH SERPL DL<=0.005 MIU/L-ACNC: 1.11 UIU/ML (ref 0.34–5.6)
WBC # BLD AUTO: 5.9 K/UL (ref 3.9–12.7)

## 2021-05-19 PROCEDURE — 84443 ASSAY THYROID STIM HORMONE: CPT | Performed by: INTERNAL MEDICINE

## 2021-05-19 PROCEDURE — 80053 COMPREHEN METABOLIC PANEL: CPT | Performed by: INTERNAL MEDICINE

## 2021-05-19 PROCEDURE — 36415 COLL VENOUS BLD VENIPUNCTURE: CPT | Performed by: INTERNAL MEDICINE

## 2021-05-19 PROCEDURE — 85025 COMPLETE CBC W/AUTO DIFF WBC: CPT | Performed by: INTERNAL MEDICINE

## 2021-08-16 ENCOUNTER — OFFICE VISIT (OUTPATIENT)
Dept: ALLERGY | Facility: CLINIC | Age: 61
End: 2021-08-16
Payer: COMMERCIAL

## 2021-08-16 VITALS
SYSTOLIC BLOOD PRESSURE: 118 MMHG | BODY MASS INDEX: 35.16 KG/M2 | OXYGEN SATURATION: 98 % | TEMPERATURE: 98 F | WEIGHT: 211 LBS | DIASTOLIC BLOOD PRESSURE: 70 MMHG | HEIGHT: 65 IN | HEART RATE: 56 BPM

## 2021-08-16 DIAGNOSIS — J34.89 SORE NOSE: ICD-10-CM

## 2021-08-16 DIAGNOSIS — R06.89 DYSPNEA AND RESPIRATORY ABNORMALITIES: Primary | ICD-10-CM

## 2021-08-16 DIAGNOSIS — R06.00 DYSPNEA AND RESPIRATORY ABNORMALITIES: Primary | ICD-10-CM

## 2021-08-16 PROCEDURE — 1160F PR REVIEW ALL MEDS BY PRESCRIBER/CLIN PHARMACIST DOCUMENTED: ICD-10-PCS | Mod: S$GLB,,, | Performed by: ALLERGY & IMMUNOLOGY

## 2021-08-16 PROCEDURE — 3008F BODY MASS INDEX DOCD: CPT | Mod: S$GLB,,, | Performed by: ALLERGY & IMMUNOLOGY

## 2021-08-16 PROCEDURE — 3074F PR MOST RECENT SYSTOLIC BLOOD PRESSURE < 130 MM HG: ICD-10-PCS | Mod: S$GLB,,, | Performed by: ALLERGY & IMMUNOLOGY

## 2021-08-16 PROCEDURE — 1160F RVW MEDS BY RX/DR IN RCRD: CPT | Mod: S$GLB,,, | Performed by: ALLERGY & IMMUNOLOGY

## 2021-08-16 PROCEDURE — 3078F PR MOST RECENT DIASTOLIC BLOOD PRESSURE < 80 MM HG: ICD-10-PCS | Mod: S$GLB,,, | Performed by: ALLERGY & IMMUNOLOGY

## 2021-08-16 PROCEDURE — 3078F DIAST BP <80 MM HG: CPT | Mod: S$GLB,,, | Performed by: ALLERGY & IMMUNOLOGY

## 2021-08-16 PROCEDURE — 99214 PR OFFICE/OUTPT VISIT, EST, LEVL IV, 30-39 MIN: ICD-10-PCS | Mod: S$GLB,,, | Performed by: ALLERGY & IMMUNOLOGY

## 2021-08-16 PROCEDURE — 99214 OFFICE O/P EST MOD 30 MIN: CPT | Mod: S$GLB,,, | Performed by: ALLERGY & IMMUNOLOGY

## 2021-08-16 PROCEDURE — 3074F SYST BP LT 130 MM HG: CPT | Mod: S$GLB,,, | Performed by: ALLERGY & IMMUNOLOGY

## 2021-08-16 PROCEDURE — 3008F PR BODY MASS INDEX (BMI) DOCUMENTED: ICD-10-PCS | Mod: S$GLB,,, | Performed by: ALLERGY & IMMUNOLOGY

## 2021-08-16 RX ORDER — INHALER,ASSIST DEVICE,LG MASK
SPACER (EA) MISCELLANEOUS
Qty: 1 EACH | Refills: 3 | Status: SHIPPED | OUTPATIENT
Start: 2021-08-16 | End: 2023-04-06

## 2021-11-30 ENCOUNTER — IMMUNIZATION (OUTPATIENT)
Dept: PRIMARY CARE CLINIC | Facility: CLINIC | Age: 61
End: 2021-11-30

## 2021-11-30 DIAGNOSIS — Z23 NEED FOR VACCINATION: Primary | ICD-10-CM

## 2021-11-30 PROCEDURE — 0004A COVID-19, MRNA, LNP-S, PF, 30 MCG/0.3 ML DOSE VACCINE: ICD-10-PCS | Mod: S$GLB,,, | Performed by: FAMILY MEDICINE

## 2021-11-30 PROCEDURE — 0004A COVID-19, MRNA, LNP-S, PF, 30 MCG/0.3 ML DOSE VACCINE: CPT | Mod: S$GLB,,, | Performed by: FAMILY MEDICINE

## 2021-11-30 PROCEDURE — 91300 COVID-19, MRNA, LNP-S, PF, 30 MCG/0.3 ML DOSE VACCINE: ICD-10-PCS | Mod: S$GLB,,, | Performed by: FAMILY MEDICINE

## 2021-11-30 PROCEDURE — 91300 COVID-19, MRNA, LNP-S, PF, 30 MCG/0.3 ML DOSE VACCINE: CPT | Mod: S$GLB,,, | Performed by: FAMILY MEDICINE

## 2021-12-15 ENCOUNTER — OFFICE VISIT (OUTPATIENT)
Dept: PULMONOLOGY | Facility: CLINIC | Age: 61
End: 2021-12-15
Payer: COMMERCIAL

## 2021-12-15 VITALS
HEART RATE: 81 BPM | BODY MASS INDEX: 28.96 KG/M2 | DIASTOLIC BLOOD PRESSURE: 88 MMHG | SYSTOLIC BLOOD PRESSURE: 126 MMHG | OXYGEN SATURATION: 99 % | WEIGHT: 174 LBS

## 2021-12-15 DIAGNOSIS — J45.40 MODERATE PERSISTENT ASTHMA, UNSPECIFIED WHETHER COMPLICATED: Primary | ICD-10-CM

## 2021-12-15 DIAGNOSIS — R06.00 DYSPNEA AND RESPIRATORY ABNORMALITIES: ICD-10-CM

## 2021-12-15 DIAGNOSIS — R06.89 DYSPNEA AND RESPIRATORY ABNORMALITIES: ICD-10-CM

## 2021-12-15 DIAGNOSIS — R05.3 CHRONIC COUGH: ICD-10-CM

## 2021-12-15 PROCEDURE — 99214 PR OFFICE/OUTPT VISIT, EST, LEVL IV, 30-39 MIN: ICD-10-PCS | Mod: S$GLB,,, | Performed by: INTERNAL MEDICINE

## 2021-12-15 PROCEDURE — 99214 OFFICE O/P EST MOD 30 MIN: CPT | Mod: S$GLB,,, | Performed by: INTERNAL MEDICINE

## 2022-07-06 ENCOUNTER — OFFICE VISIT (OUTPATIENT)
Dept: PULMONOLOGY | Facility: CLINIC | Age: 62
End: 2022-07-06
Payer: COMMERCIAL

## 2022-07-06 VITALS
OXYGEN SATURATION: 99 % | HEART RATE: 66 BPM | WEIGHT: 155 LBS | BODY MASS INDEX: 25.79 KG/M2 | DIASTOLIC BLOOD PRESSURE: 70 MMHG | SYSTOLIC BLOOD PRESSURE: 118 MMHG

## 2022-07-06 DIAGNOSIS — Z12.39 SCREENING BREAST EXAMINATION: Primary | ICD-10-CM

## 2022-07-06 PROCEDURE — 3074F SYST BP LT 130 MM HG: CPT | Mod: CPTII,S$GLB,, | Performed by: INTERNAL MEDICINE

## 2022-07-06 PROCEDURE — 1159F MED LIST DOCD IN RCRD: CPT | Mod: CPTII,S$GLB,, | Performed by: INTERNAL MEDICINE

## 2022-07-06 PROCEDURE — 3078F DIAST BP <80 MM HG: CPT | Mod: CPTII,S$GLB,, | Performed by: INTERNAL MEDICINE

## 2022-07-06 PROCEDURE — 3078F PR MOST RECENT DIASTOLIC BLOOD PRESSURE < 80 MM HG: ICD-10-PCS | Mod: CPTII,S$GLB,, | Performed by: INTERNAL MEDICINE

## 2022-07-06 PROCEDURE — 99214 OFFICE O/P EST MOD 30 MIN: CPT | Mod: S$GLB,,, | Performed by: INTERNAL MEDICINE

## 2022-07-06 PROCEDURE — 3008F BODY MASS INDEX DOCD: CPT | Mod: CPTII,S$GLB,, | Performed by: INTERNAL MEDICINE

## 2022-07-06 PROCEDURE — 1160F PR REVIEW ALL MEDS BY PRESCRIBER/CLIN PHARMACIST DOCUMENTED: ICD-10-PCS | Mod: CPTII,S$GLB,, | Performed by: INTERNAL MEDICINE

## 2022-07-06 PROCEDURE — 3074F PR MOST RECENT SYSTOLIC BLOOD PRESSURE < 130 MM HG: ICD-10-PCS | Mod: CPTII,S$GLB,, | Performed by: INTERNAL MEDICINE

## 2022-07-06 PROCEDURE — 99214 PR OFFICE/OUTPT VISIT, EST, LEVL IV, 30-39 MIN: ICD-10-PCS | Mod: S$GLB,,, | Performed by: INTERNAL MEDICINE

## 2022-07-06 PROCEDURE — 3008F PR BODY MASS INDEX (BMI) DOCUMENTED: ICD-10-PCS | Mod: CPTII,S$GLB,, | Performed by: INTERNAL MEDICINE

## 2022-07-06 PROCEDURE — 1159F PR MEDICATION LIST DOCUMENTED IN MEDICAL RECORD: ICD-10-PCS | Mod: CPTII,S$GLB,, | Performed by: INTERNAL MEDICINE

## 2022-07-06 PROCEDURE — 1160F RVW MEDS BY RX/DR IN RCRD: CPT | Mod: CPTII,S$GLB,, | Performed by: INTERNAL MEDICINE

## 2022-07-06 RX ORDER — VALACYCLOVIR HYDROCHLORIDE 1 G/1
1000 TABLET, FILM COATED ORAL DAILY PRN
Qty: 90 TABLET | Refills: 1 | Status: SHIPPED | OUTPATIENT
Start: 2022-07-06 | End: 2023-04-06

## 2022-07-06 NOTE — PROGRESS NOTES
"  Office Visit    Patient Name: Valdez Salazar  MRN: [id number]  : 1960      Reason for visit: Asthma    HPI:     2018 - Pt with h/o asthma ( has seen Dr Bill in past), has trouble with dyspnea.  Has methacholine challenge test which was "positive".  Was on singulair and BREO, still had difficulties and "thick mucus".  Has trouble when reclining.  Has seen GI to evaluate for reflux ( had esophageal "stretched").  Was referred to Dr Kelly for evaluation and now here to establish care.  Hutchins also seen Dr Hart for ENT evaluation.  Had medication changes done by dr Kelly and feels better overall.    2018 - HAs been doing fine until last few days then she had some waking episodes.  This AM had some increased chest tightness and has used rescue inhaler more.  Has had some dysphagia and saw Dr Marroquin and was started on diflucan.  Feels better with regards with this.  Has not noted wheezing but does feel tight.    12/3/2018 - Here for follow up to have left knee replacement at [facility] this Friday, breathing is ok, has some cough and mucus.  After last vii she developed laryngitis which has yet to clear (? If related to her Symbicort) - she is to see Dr Peterson tomorrow AM.    Preoperative Pulmonary Clearance    Pt was seen for preoperative clearance from a pulmonary standpoint for planned left polly replacement.  The risks of the procedure have been discussed with the patient including the risk of prolonged ventilatory support/difficulty weaning from ventilator, post-procedure pneumonia, post-procedure respiratory failure and DVT/pulmonary embolism.  The pt is currently stable from their respiratory status and they are cleared for the planned procedure at increased risk.  The risk should not be considered prohibitive.  If you have any questions please contact me.  This clearance is pending evaluation by ENT for persistent hoarseness.    2019 - Here for follow up, doing better overall.  Has seen " "ENT and was found to have a vocal cord nodule, is being treated for reflux.  Still with significant vocal issues.  Mucus has been better.  Feels that asthma control is better and cough is much better than where it was.    5/28/2019 - Here for follow up.  Asthma control has better.  Hoarseness has been better - she has found that having a small cocktail daily had helped with her symptoms.  She tells me that her vocal cord nodule is "better".  Cough is much better. No issues with her medications but does have some concerns that her vocal issues may be related to her inhalers.    7/25/2019 - Was in ER 7/6 with pleurisy (ER note reviewed) - had CTA - negative but had finding of gallstones.   Continues with URI symptoms, post nasal drip.  Has some right arm discomfort (some soreness to right deltoid muscle).  Has some mucus at times which is clear.  Has not noted reflux symptoms (still taking meds).  Asthma control has been OK overall.  Gave her Dr Comer's     12/3/2019 - Here for follow up, since last visit she had cholecystectomy, found to have thyroid nodule and was told that it was benign.  Asthma control has been good overall - does note some cough after taking her symbicort at night.  Has had a head cold recently - better now.    6/4/2020 - Here for follow up, overall asthma control has been OK.  She has no known exposure to corona virus and has been practicing social distancing.  Pt asthma is currently stable with no increases in SOB, KAHN or wheezing.  There has been no increase in use of  rescue medications.  Have reviewed medications with pt including the roles of rescue and controlling medications.  All questions answered.  Pt reports no problems with technique with inhalers.  We discussed the possibility of de-escalation of therapy if asthma control remains stable.  Does have some occasional thick mucus.    11/11/2020 - Here for follow up, overall has been doing well, some increasede symptoms with weather " changes.  Pt asthma is currently stable with no increases in SOB, KAHN or wheezing.  There has been no increase in use of  rescue medications.  Have reviewed medications with pt including the roles of rescue and controlling medications.  All questions answered.  Pt reports no problems with technique with inhalers.  We discussed the possibility of de-escalation of therapy if asthma control remains stable.  Patient has no known corona virus exposures and has been practicing social distancing.  We have discussed the virus and precautions and all questions have been answered    5/13/2021 - Here for follow up, Pt asthma is currently stable with no increases in SOB, KAHN or wheezing.  There has been no increase in use of  rescue medications.  Have reviewed medications with pt including the roles of rescue and controlling medications.  All questions answered.  Pt reports no problems with technique with inhalers.  We discussed the possibility of de-escalation of therapy if asthma control remains stable.  Did have some trouble a few weeks ago when the weather was bad but it settled down.  Patient has no known recent corona virus exposures and has been practicing social distancing.  We have discussed the virus and precautions and all questions have been answered.  She had Covid vaccine (Pfizer) and did have some neck/back pain about 4 days later.  In December her  and daughter had Covid but she was tested and was negative.  .  12/15/2021 - Here for follow up, Pt asthma is currently stable with no increases in SOB, KAHN or wheezing.  There has been no increase in use of  rescue medications.  Have reviewed medications with pt including the roles of rescue and controlling medications.  All questions answered.  Pt reports no problems with technique with inhalers.  We discussed the possibility of de-escalation of therapy if asthma control remains stable.  Overall doing well.  Currently living in [place] and planning on  building there.  She has lost > 50 # on her current diet.  Patient has no known corona virus exposures and has been practicing social distancing.  We have discussed the virus and precautions and all questions have been answered.  She has had Covid vaccine.  Has started baking again and has some occasional cough.    7/6/2022 - Here for follow up,  Has lost 80# on her diet (OPTAVIA).  Asthma has been doing well, had one episode in May.  Pt asthma is currently stable with no increases in SOB, KAHN or wheezing.  There has been no increase in use of  rescue medications.  Have reviewed medications with pt including the roles of rescue and controlling medications.  All questions answered.  Pt reports no problems with technique with inhalers.  We discussed the possibility of de-escalation of therapy if asthma control remains stable.          Asthma Flowsheet    Asthma -  Moderate  Persistent       Controlled    ACT - 21    Baseline PFT - FEV1- 95 %    Serum eosinophil count - 300  Serum IgE - < 2    Allergy testing - na   Desensitization - no    Therapy: ICS/LABA/LAMA +      PRN albuterol +                 Singulair                  Xolair                  Nucala                  Cinqair       Past Medical History    Past Medical History:   Diagnosis Date    Acid reflux     Allergic sinusitis     Arthritis     knees, hands    Asthma 2017    dr finn    Carpal tunnel syndrome on left     no surg yet    Gall stones 09/2019    found with lung w/o-- numerous stones-- surg scheduled    Hoarseness     Jaw disease     occ jaw locks    Low iron 2000    Migraines     rare now    Numbness     right hand-     Phobia     of needles-- hard to find iv's    PONV (postoperative nausea and vomiting)     Thyroid disease     trouble swallowing-- being w/o    Vocal cord nodules        Past Surgical History    Past Surgical History:   Procedure Laterality Date    ADENOIDECTOMY      CARPAL TUNNEL RELEASE Right 2014      SECTION      COLONOSCOPY      ELBOW SURGERY Left     and hardware removal    HYSTERECTOMY      JOINT REPLACEMENT  2018    LAPAROSCOPIC CHOLECYSTECTOMY N/A 10/25/2019    Procedure: CHOLECYSTECTOMY, LAPAROSCOPIC;  Surgeon: Douglas Quiroz III, MD;  Location: [facility] OR;  Service: General;  Laterality: N/A;    REPAIR OF RECTOCELE      TONSILLECTOMY      TOTAL KNEE ARTHROPLASTY Left 2018    Procedure: ARTHROPLASTY, KNEE, TOTAL;  Surgeon: Claude S. Williams IV, MD;  Location: [facility] OR;  Service: Orthopedics;  Laterality: Left;       Medications      Current Outpatient Medications:     (Magic mouthwash) 1:1:1 Benadryl 12.5mg/5ml liq, aluminum & magnesium hydroxide-simehticone (Maalox), lidocaine viscous 2%, Swish and spit 5 mLs every 4 (four) hours as needed (cough , sore throat.)., Disp: 300 mL, Rfl: 1    albuterol (PROVENTIL/VENTOLIN HFA) 90 mcg/actuation inhaler, INHALE 2 PUFFS EVERY 4 HOURS AS NEEDED FOR WHEEZING, Disp: 36 g, Rfl: 1    azelastine (ASTELIN) 137 mcg (0.1 %) nasal spray, 1 spray per nare twice per day, may use up to 4 times daily 1 spray per nare for congestion and post nasal drip, Disp: 90 mL, Rfl: 4    diphenhydrAMINE (BENADRYL) 25 mg capsule, Take 25 mg by mouth every 6 (six) hours as needed for Itching., Disp: , Rfl:     inhalat.spacing dev,large mask (AEROCHAMBER PLUS Z STAT LG MSK) Spcr, Use with in haler, Disp: 1 each, Rfl: 3    OPTICHAMBER BARBARA-SML MASK Spcr, USE AS DIRECTED, Disp: 1 each, Rfl: 3    pantoprazole (PROTONIX) 40 MG tablet, TAKE 1 TABLET BY MOUTH TWICE A DAY, Disp: 180 tablet, Rfl: 3    sucralfate (CARAFATE) 1 gram tablet, TAKE 1 TABLET BY MOUTH 4 TIMES A DAY BEFORE MEALS AND NIGHTLY, Disp: 360 tablet, Rfl: 0    SYMBICORT 160-4.5 mcg/actuation HFAA, INHALE 2 PUFFS INTO THE LUNGS EVERY 12 (TWELVE) HOURS. CONTROLLER, Disp: 10.2 g, Rfl: 12    traMADol (ULTRAM) 50 mg tablet, Take 1 tablet (50 mg total) by mouth every 4 (four) hours as needed for  Pain., Disp: 30 tablet, Rfl:     valACYclovir (VALTREX) 1000 MG tablet, Take 1,000 mg by mouth daily as needed. , Disp: , Rfl: 3    azithromycin (ZITHROMAX Z-CHRISTIAN) 250 MG tablet, Take 2 po today then 1 a day for 4 days (Patient not taking: No sig reported), Disp: 6 tablet, Rfl: 0    famotidine (PEPCID) 20 MG tablet, Take 1 tablet (20 mg total) by mouth 2 (two) times daily., Disp: 60 tablet, Rfl: 11    pantoprazole (PROTONIX) 40 MG tablet, Take 40 mg by mouth 2 (two) times daily., Disp: , Rfl:     pantoprazole (PROTONIX) 40 MG tablet, TAKE 1 TABLET BY MOUTH TWICE A DAY, Disp: 180 tablet, Rfl: 3    predniSONE (DELTASONE) 10 MG tablet, Take 3 a day x 3 days then 2 a day x 3 days then 1 a day x 3 days (Patient not taking: No sig reported), Disp: 18 tablet, Rfl: 0    Allergies    Review of patient's allergies indicates:   Allergen Reactions    Codeine Nausea And Vomiting    Stadol [butorphanol tartrate] Shortness Of Breath     Pt reports stops breathing    Wiley-dur Shortness Of Breath     Stops breathing    Morphine Nausea And Vomiting     Severe.      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   HENT: Positive for congestion. Negative for nosebleeds.         + hoarseness   Eyes: Negative for pain.    Respiratory: Positive for cough. Negative for hemoptysis, sputum production, shortness of breath, wheezing and stridor.    Cardiovascular: Negative for chest pain, palpitations, orthopnea, claudication, leg swelling and PND.   Gastrointestinal: Positive for heartburn. Negative for abdominal pain, blood in stool, constipation, diarrhea, melena, nausea and vomiting.   Genitourinary: Negative for dysuria, flank pain, frequency, hematuria and urgency.   Musculoskeletal: Negative for falls and myalgias.   Skin: Negative for itching and rash.   Neurological: Negative for sensory change, focal weakness and weakness.   Psychiatric/Behavioral: Negative for depression. The patient is not nervous/anxious.        Physical Exam    Vitals:    07/06/22 1041   BP: 118/70   BP Location: Left arm   Patient Position: Sitting   Pulse: 66   SpO2: 99%   Weight: 70.3 kg (155 lb)       Physical Exam  Vitals and nursing note reviewed.   Constitutional:       General: She is not in acute distress.     Appearance: She is well-developed. She is not diaphoretic.   HENT:      Head: Normocephalic and atraumatic.      Right Ear: External ear normal.      Left Ear: External ear normal.      Nose: Nose normal.   Eyes:      Conjunctiva/sclera: Conjunctivae normal.      Pupils: Pupils are equal, round, and reactive to light.   Neck:      Thyroid: No thyromegaly.      Vascular: No JVD.      Trachea: No tracheal deviation.   Cardiovascular:      Rate and Rhythm: Normal rate and regular rhythm.      Heart sounds: Normal heart sounds. No murmur heard.    No friction rub. No gallop.   Pulmonary:      Effort: Pulmonary effort is normal. No respiratory distress.      Breath sounds: Normal breath sounds. No stridor. No wheezing or rales.   Chest:      Chest wall: No tenderness.   Abdominal:      General: Bowel sounds are normal. There is no distension.      Palpations: Abdomen is soft.      Tenderness: There is no abdominal tenderness.   Musculoskeletal:          General: No tenderness. Normal range of motion.      Cervical back: Normal range of motion and neck supple.   Lymphadenopathy:      Cervical: No cervical adenopathy.   Skin:     General: Skin is warm and dry.   Neurological:      Mental Status: She is alert and oriented to person, place, and time.      Cranial Nerves: No cranial nerve deficit.   Psychiatric:         Behavior: Behavior normal.         Labs    Lab Results   Component Value Date    WBC 5.90 05/19/2021    HGB 13.2 05/19/2021    HCT 41.1 05/19/2021     05/19/2021       Sodium   Date Value Ref Range Status   05/19/2021 141 136 - 145 mmol/L Final     Potassium   Date Value Ref Range Status   05/19/2021 4.5 3.5 - 5.1 mmol/L Final     Chloride   Date Value Ref Range Status   05/19/2021 107 95 - 110 mmol/L Final     CO2   Date Value Ref Range Status   05/19/2021 28 23 - 29 mmol/L Final     Glucose   Date Value Ref Range Status   05/19/2021 104 70 - 110 mg/dL Final     BUN   Date Value Ref Range Status   05/19/2021 12 8 - 23 mg/dL Final     Creatinine   Date Value Ref Range Status   05/19/2021 0.6 0.5 - 1.4 mg/dL Final   07/29/2013 0.7 0.5 - 1.4 mg/dL Final     Calcium   Date Value Ref Range Status   05/19/2021 9.4 8.7 - 10.5 mg/dL Final   07/29/2013 9.1 8.7 - 10.5 mg/dL Final     Total Protein   Date Value Ref Range Status   05/19/2021 7.3 6.0 - 8.4 g/dL Final     Albumin   Date Value Ref Range Status   05/19/2021 4.1 3.5 - 5.2 g/dL Final     Total Bilirubin   Date Value Ref Range Status   05/19/2021 0.8 0.1 - 1.0 mg/dL Final     Comment:     For infants and newborns, interpretation of results should be based  on gestational age, weight and in agreement with clinical  observations.    Premature Infant recommended reference ranges:  Up to 24 hours.............<8.0 mg/dL  Up to 48 hours............<12.0 mg/dL  3-5 days..................<15.0 mg/dL  6-29 days.................<15.0 mg/dL       Alkaline Phosphatase   Date Value Ref Range Status    05/19/2021 67 55 - 135 U/L Final   07/29/2013 79 55 - 135 U/L Final     AST   Date Value Ref Range Status   05/19/2021 19 10 - 40 U/L Final   07/29/2013 17 10 - 40 U/L Final     ALT   Date Value Ref Range Status   05/19/2021 21 10 - 44 U/L Final     Anion Gap   Date Value Ref Range Status   05/19/2021 6 (L) 8 - 16 mmol/L Final   07/29/2013 13 5 - 15 meq/L Final       Xrays        Impression/Plan    Problem List Items Addressed This Visit        ENT    Chronic rhinitis  · stable       Pulmonary    Chronic cough  · multifactorial  · stable       GI    Laryngopharyngeal reflux (LPR)  ·  being treated      Other Visit Diagnoses     Asthma, unspecified asthma severity, unspecified whether complicated, unspecified whether persistent   Continue present medications.   Will refill medications as needed.   Instructed patient to contact us with any issues concerning their medications (cost, reactions, etc.).   Have discussed with patient about inciting conditions which may exacerbate their disease.   We did discuss possible new therapies or de-escalation of therapy (if appropriate).   Discussed the possibility of looking into alternate therapies and/or steroid sparing options   Discussed whether further evaluation of allergies would be warranted   Discussed whether reflux could be playing a role in their symptoms   All questions answered   RTC 6 months   Patient instructed that they are to call if symptoms change or new issues develop prior to their next visit.      Vitamin D deficiency  · being replaced    Laryngitis  · better    Allergies  · sees Dr Robin Corbin MD

## 2022-07-12 ENCOUNTER — TELEPHONE (OUTPATIENT)
Dept: PULMONOLOGY | Facility: CLINIC | Age: 62
End: 2022-07-12

## 2022-07-12 NOTE — TELEPHONE ENCOUNTER
Scheduling is calling stated they need you to change the diagnosis code for the mammogram to z12.31 is the only screening payable code. Also, she said when you order a mammogram we have to also order a ultrasound of breat to go with it

## 2022-07-12 NOTE — TELEPHONE ENCOUNTER
Now they are saying they need the diagnosis code to be specific as to what it is liek the reason why they need the ultrasound an the mammogram can't have a screening diagnosis

## 2022-08-16 ENCOUNTER — OFFICE VISIT (OUTPATIENT)
Dept: ALLERGY | Facility: CLINIC | Age: 62
End: 2022-08-16
Payer: COMMERCIAL

## 2022-08-16 VITALS
SYSTOLIC BLOOD PRESSURE: 110 MMHG | HEIGHT: 65 IN | OXYGEN SATURATION: 98 % | BODY MASS INDEX: 25.89 KG/M2 | TEMPERATURE: 98 F | DIASTOLIC BLOOD PRESSURE: 70 MMHG | WEIGHT: 155.38 LBS | HEART RATE: 68 BPM

## 2022-08-16 DIAGNOSIS — R13.19 ESOPHAGEAL DYSPHAGIA: ICD-10-CM

## 2022-08-16 DIAGNOSIS — R06.00 DYSPNEA AND RESPIRATORY ABNORMALITIES: Primary | ICD-10-CM

## 2022-08-16 DIAGNOSIS — J31.0 NON-ALLERGIC RHINITIS: ICD-10-CM

## 2022-08-16 DIAGNOSIS — R06.89 DYSPNEA AND RESPIRATORY ABNORMALITIES: Primary | ICD-10-CM

## 2022-08-16 PROCEDURE — 1160F PR REVIEW ALL MEDS BY PRESCRIBER/CLIN PHARMACIST DOCUMENTED: ICD-10-PCS | Mod: CPTII,S$GLB,, | Performed by: ALLERGY & IMMUNOLOGY

## 2022-08-16 PROCEDURE — 1159F PR MEDICATION LIST DOCUMENTED IN MEDICAL RECORD: ICD-10-PCS | Mod: CPTII,S$GLB,, | Performed by: ALLERGY & IMMUNOLOGY

## 2022-08-16 PROCEDURE — 3008F BODY MASS INDEX DOCD: CPT | Mod: CPTII,S$GLB,, | Performed by: ALLERGY & IMMUNOLOGY

## 2022-08-16 PROCEDURE — 1160F RVW MEDS BY RX/DR IN RCRD: CPT | Mod: CPTII,S$GLB,, | Performed by: ALLERGY & IMMUNOLOGY

## 2022-08-16 PROCEDURE — 3074F SYST BP LT 130 MM HG: CPT | Mod: CPTII,S$GLB,, | Performed by: ALLERGY & IMMUNOLOGY

## 2022-08-16 PROCEDURE — 99213 OFFICE O/P EST LOW 20 MIN: CPT | Mod: S$GLB,,, | Performed by: ALLERGY & IMMUNOLOGY

## 2022-08-16 PROCEDURE — 3078F DIAST BP <80 MM HG: CPT | Mod: CPTII,S$GLB,, | Performed by: ALLERGY & IMMUNOLOGY

## 2022-08-16 PROCEDURE — 3074F PR MOST RECENT SYSTOLIC BLOOD PRESSURE < 130 MM HG: ICD-10-PCS | Mod: CPTII,S$GLB,, | Performed by: ALLERGY & IMMUNOLOGY

## 2022-08-16 PROCEDURE — 3008F PR BODY MASS INDEX (BMI) DOCUMENTED: ICD-10-PCS | Mod: CPTII,S$GLB,, | Performed by: ALLERGY & IMMUNOLOGY

## 2022-08-16 PROCEDURE — 1159F MED LIST DOCD IN RCRD: CPT | Mod: CPTII,S$GLB,, | Performed by: ALLERGY & IMMUNOLOGY

## 2022-08-16 PROCEDURE — 99213 PR OFFICE/OUTPT VISIT, EST, LEVL III, 20-29 MIN: ICD-10-PCS | Mod: S$GLB,,, | Performed by: ALLERGY & IMMUNOLOGY

## 2022-08-16 PROCEDURE — 3078F PR MOST RECENT DIASTOLIC BLOOD PRESSURE < 80 MM HG: ICD-10-PCS | Mod: CPTII,S$GLB,, | Performed by: ALLERGY & IMMUNOLOGY

## 2022-08-16 RX ORDER — INHALER,ASSIST DEV,SMALL MASK
SPACER (EA) MISCELLANEOUS
Qty: 1 EACH | Refills: 3 | Status: SHIPPED | OUTPATIENT
Start: 2022-08-16 | End: 2023-04-06

## 2022-08-16 NOTE — PATIENT INSTRUCTIONS
Continue current regimen.     Doing great !!!     Follow in 12 months ! Sooner If needed.       Look into [facility] they study obesity and asthma and obesity and relationships to inflammation.

## 2022-08-16 NOTE — PROGRESS NOTES
Subjective:       Patient ID: Valdez Salazar is a 62 y.o. female.    Chief Complaint: Cough and non allergic rhinitis       Pt presents for chronic cough, rhinitis, dysphagia.     Other doctors in tx: Dr. Hart; Dr. Marroquin;  Dr. Corbin.     Her last visit 8/21    Since her last visit, she has done well.     She mainly takes benadryl and symbicort and daily protonix.     Non allergic rhinitis   Doing well.   Rhinitis: serum IgE - negative NON-allergic.   Condition: pnd improved/ stable   Her voice is hoarse but improving felt to be due to LPR/nodule.   ENT - Dr. Peterson states she is getting a nodule on her vocal cord. Was advised to increase water intake and to decrease force of cough due to nodule.   Pt states she stopped nasal sprays post her ENT visit.   Pt states being off the sprays, there really wasn't a change in her symptoms vs being on it.   Still using saline.     June 2020 tried budesonide and saline but didn't tolerate it.     LPR:  Hoarseness of voice is improved   Was started on carafate and bid protonix x 6 weeks. Using zantac prn after dinner if she feels there are seasonings. Due to Zantac recall, pepcid.   Main issue is reflux that is causing mucus.   She can only get carafate in about twice per day. Seems to help.   A trigger has been bell peppers in a meal she ate and felt that the mucus filled up in her throat, she had ranitidine 150 mg that then calmed down the episode along with benadryl.   Pt states she needs to clear the mucus to breathe and cough. Zantac helps.   States everything she eats, can't breathe. Takes ranitidine - may take after eating, and benadryl-D that may help temporarily.   Pt states once she takes these meds she feels she can breathe.   She stopped the protonix 40 mg. Feels wasn't helping and took in the evening.   Does notice diet changes have improved symptoms.   She may take benadryl daily. This may increase mucus viscosity.   Dr. Marroquin- esophagus stretching may  "need to occur.   Getting food stuck few days per week with breads. 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   Pt notes onions, bellpeppers, heavily seasoned foods can exacerbate mucus.     Prior history   In the past she has tried omeprazole 40 mg BID, but Dr. farrar took her off this regimen.   Dr. Hart performed scope and dx with LPR and sent her to GI, Dr. Farrar. No vocal cord lesions at that time, but now may be developing due to constant throat clearing.     Cough:   Condition:stable   Trigger: weather changes.   Sx: coughing spells, thick mucus- main issue is the "thick mucus  "  Likes symbicort 160 mcg and has controlled her breathing. Has been doing well since on it.     Has symptoms despite the spirva and symbicort. Stopped spiriva .   Increased use of rescue inhaler. Given a spacer to see if this helps the voice.   Intensity: They can be bad enough to where she considers going to the ED- however she is 90 % improved.   Tx: started symbicort 80 1 puff bid-  increased to 160 2 puffs twice per day by Dr. Corbin,  spiriva 2 puff qday rx given 1 puff twice per day, mucinex D. Magic mouthwash qhs.  We had a trial off montelukast. finished medrol pack for orthopedics- 12/3/2019  -Rescue inhaler may or may not help, not a consistent efficacy. mucinex-D helps with thickness of mucus. Discussed D can increase her BP.   -She has been on an antibiotic in the past but isn't unsure of the name or type. - early June 2018. It helped the "color " of the mucus but hasn't had improvement in sx.   Trigger: when she eats, more mucus comes and she coughs. Doesn't matter if it's water or food.   Protonix 40 mg given for BID x 4 weeks then once daily. Thinks that it helped with the mucus. BID was a better formulation than qday. But pt has been inconsistent with this.     Duration: She has been having these symptoms for the past year or since 2019.    Voice is still improved off breo.   Added cranberry and lime at night.    Of " "note, Since she's been out of [facility], her symptoms have been bad.   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     Imaging:  Ct chest reviewed and no significant findings.   Sinus ct in 2015 reviewed and no significant findings.    Discussed possibly repeating as it has been 3 years since last scan.     Atopic history  Asthma: no history - but had a methacholine challenge- per patient report positive.   - stopped breo due to dysphonia. and montelukast was d/c. Now seeing Dr. Corbin.   Rhinitis: see above  Dysphagia: at times food gets stuck- see  Cara for reflux- needs f/u  Food allergy: none   Oral allergy: none       Review of Systems      General: neg unexpected weight changes, fevers, chills, night sweats, malaise  HEENT: see hpi, Neg eye pain, vision changes, ear drainage, nose bleeds, throat tightness, sores in the mouth  CV: Neg chest pain, palpitations, swelling  Resp: see hpi, neg shortness of breath, hemoptysis  GI: see hpi, neg night abdominal pain, chronic diarrhea, chronic constipation  Derm: See Hpi, neg new rash, neg flushing  Mu/sk: Neg joint pain, joint swelling   Psych: Neg anxiety  neuro: neg chronic headaches, muscle weakness  Endo: neg heat/cold intolerance, chronic fatigue    Objective:     Vitals:    08/16/22 1424   BP: 110/70   Pulse: 68   Temp: 97.7 °F (36.5 °C)   SpO2: 98%   Weight: 70.5 kg (155 lb 6.4 oz)   Height: 5' 5" (1.651 m)        Physical Exam      General: no acute distress, well developed well nourished   HEENT:   Head:normocephalic atraumatic  Eyes: YOU, EOMI, Neg injection, scleral icterus, or conjunctival papillary hypertrophy.  Ears: normal canal  Nose: 2-3+ inferior turbinates pink, neg nasal polyps            Mucosa: dryness             Septal irritation: none   Neck: supple, Full range of motion, neg lymphadenopathy  Chest: full respiratory excursion no abnormal chest abnormality  Resp: clear to ascultation bilaterally  CV: RRR, neg MRG, brisk " capillary refill  Abdomen: BS+, non tender, non distended, neg hepatosplenomegaly.   Ext:  Neg clubbing, cyanosis, pitting edema  Skin: Neg rashes or lesions  Lymph: neg supraclavicular, axillary       Assessment:       1. Dyspnea and respiratory abnormalities    2. Esophageal dysphagia    3. Non-allergic rhinitis        Plan:       Dyspnea and respiratory abnormalities  -     inhalat. spacing dev,sm. mask (OPTICHAMBER BARBARA-SML MASK) Spcr; USE AS DIRECTED  Dispense: 1 each; Refill: 3    Esophageal dysphagia    Non-allergic rhinitis        Hoarseness  8/22:  Improved  - continue 160 mcg 1 puff bid symbicort       - symbicort 160 2 puffs bid- we may need to consider a pro drug alvesco to see if this helps with hoarseness.   Hoarseness is improved.   -continue magic mouthwash to help with throat irritation prn.   - carafate qid before meals and bedtime. Pt not to be so strict on timing to take and try to get in more than 2 pills per day.   stopped ranitidine 150 mg bid 6/2020  Changed to pepcid 20 mg bid. 6/2020  protonix 40 bid x 6 weeks but pt wants to continue for 8 weeks to 3 months. 6/2020  Defer to GI for reflux management.     Non allergic rhinitis- inhalant panel quest - negative.   -8/22:  Doing well currently with benadryl daily .     - saline, pt elects to stop nasal sprays 6/2020  - technique reviewed today    - continue plain mucinex 1200 mg bid- discussed D or pseudophed has risk to increase her BP.  Limit the amount of dairy as it may increase mucus production.     Started budesonide 2 vials daily with xlear and skye med. 6/2020    Dyspnea- reflux being treated, feels mucus is more of the trigger- medications are helping.   - protonix daily helping , lost 80 pounds in the past year and needs only daily     - try to limit pnd as a source, ipratopium prn   - discontinued 2 puffs spiriva qday- pt states coughing fits 6/2020  - continue lpr management per gi   - pt to continue pulmonary management-  positive methacholine challenge   - trial off montelukast to simplify her medication routine.     Lpr/dysphagia - reflux as an exacerbater.   - continue ppi -  pantoprazole 40 mg to BID- needs to decrease to help curb potential SE. Would like to extend to 3 months. 6/2020  - esophagus stretching may need to occur with GI.   Spicy foods are temporally related to increased mucus. Especially bell pepper onion and garlic   - discussed to see Dr. Marroquin as food is getting stuck a few days per week.   - Dr. Marroquin may stretch esophagus.   - carafate qid before meals and bedtime.   -Continue pepcid 20 mg bid- but has low efficacy.   - protonix 40 bid x 6 weeks then qday- trial: done but wants extend to 3 months.     Reviewed CBC, Ige, and Vitamin D labs, pt to continue 5000 IU daily.   Highest eos 300.       Follow up in 12 months. Sooner if needed.       Arabella Kelly M.D.  Allergy/Immunology  [facility]   [phone] phone  [phone] fax

## 2022-08-31 ENCOUNTER — TELEPHONE (OUTPATIENT)
Dept: PULMONOLOGY | Facility: CLINIC | Age: 62
End: 2022-08-31

## 2022-08-31 RX ORDER — NIRMATRELVIR AND RITONAVIR 300-100 MG
KIT ORAL
Qty: 30 TABLET | Refills: 0 | Status: SHIPPED | OUTPATIENT
Start: 2022-08-31 | End: 2022-09-05

## 2022-08-31 NOTE — TELEPHONE ENCOUNTER
Patient took a covid home test very positive . She is using otc medication ( not responding ) . She is achy, nasal congestion, an had a fever ( 101) . She would like to see if you can call er in something .

## 2022-11-19 ENCOUNTER — OFFICE VISIT (OUTPATIENT)
Dept: URGENT CARE | Facility: CLINIC | Age: 62
End: 2022-11-19
Payer: COMMERCIAL

## 2022-11-19 VITALS
OXYGEN SATURATION: 99 % | WEIGHT: 153 LBS | TEMPERATURE: 97 F | DIASTOLIC BLOOD PRESSURE: 82 MMHG | HEART RATE: 65 BPM | BODY MASS INDEX: 25.49 KG/M2 | HEIGHT: 65 IN | RESPIRATION RATE: 16 BRPM | SYSTOLIC BLOOD PRESSURE: 128 MMHG

## 2022-11-19 DIAGNOSIS — R35.0 URINATION FREQUENCY: ICD-10-CM

## 2022-11-19 DIAGNOSIS — N30.01 ACUTE CYSTITIS WITH HEMATURIA: Primary | ICD-10-CM

## 2022-11-19 LAB
BILIRUB UR QL STRIP: NEGATIVE
GLUCOSE UR QL STRIP: NEGATIVE
KETONES UR QL STRIP: NEGATIVE
LEUKOCYTE ESTERASE UR QL STRIP: POSITIVE
PH, POC UA: 6
POC BLOOD, URINE: POSITIVE
POC NITRATES, URINE: NEGATIVE
PROT UR QL STRIP: NEGATIVE
SP GR UR STRIP: 1.01 (ref 1–1.03)
UROBILINOGEN UR STRIP-ACNC: NORMAL (ref 0.1–1.1)

## 2022-11-19 PROCEDURE — 99213 PR OFFICE/OUTPT VISIT, EST, LEVL III, 20-29 MIN: ICD-10-PCS | Mod: S$GLB,,, | Performed by: NURSE PRACTITIONER

## 2022-11-19 PROCEDURE — 99213 OFFICE O/P EST LOW 20 MIN: CPT | Mod: S$GLB,,, | Performed by: NURSE PRACTITIONER

## 2022-11-19 PROCEDURE — 3008F BODY MASS INDEX DOCD: CPT | Mod: S$GLB,,, | Performed by: NURSE PRACTITIONER

## 2022-11-19 PROCEDURE — 3079F PR MOST RECENT DIASTOLIC BLOOD PRESSURE 80-89 MM HG: ICD-10-PCS | Mod: S$GLB,,, | Performed by: NURSE PRACTITIONER

## 2022-11-19 PROCEDURE — 3074F PR MOST RECENT SYSTOLIC BLOOD PRESSURE < 130 MM HG: ICD-10-PCS | Mod: S$GLB,,, | Performed by: NURSE PRACTITIONER

## 2022-11-19 PROCEDURE — 1159F PR MEDICATION LIST DOCUMENTED IN MEDICAL RECORD: ICD-10-PCS | Mod: S$GLB,,, | Performed by: NURSE PRACTITIONER

## 2022-11-19 PROCEDURE — 1160F RVW MEDS BY RX/DR IN RCRD: CPT | Mod: S$GLB,,, | Performed by: NURSE PRACTITIONER

## 2022-11-19 PROCEDURE — 81003 URINALYSIS AUTO W/O SCOPE: CPT | Mod: QW,S$GLB,, | Performed by: NURSE PRACTITIONER

## 2022-11-19 PROCEDURE — 81003 POCT URINALYSIS, DIPSTICK, AUTOMATED, W/O SCOPE: ICD-10-PCS | Mod: QW,S$GLB,, | Performed by: NURSE PRACTITIONER

## 2022-11-19 PROCEDURE — 3008F PR BODY MASS INDEX (BMI) DOCUMENTED: ICD-10-PCS | Mod: S$GLB,,, | Performed by: NURSE PRACTITIONER

## 2022-11-19 PROCEDURE — 3079F DIAST BP 80-89 MM HG: CPT | Mod: S$GLB,,, | Performed by: NURSE PRACTITIONER

## 2022-11-19 PROCEDURE — 1160F PR REVIEW ALL MEDS BY PRESCRIBER/CLIN PHARMACIST DOCUMENTED: ICD-10-PCS | Mod: S$GLB,,, | Performed by: NURSE PRACTITIONER

## 2022-11-19 PROCEDURE — 1159F MED LIST DOCD IN RCRD: CPT | Mod: S$GLB,,, | Performed by: NURSE PRACTITIONER

## 2022-11-19 PROCEDURE — 3074F SYST BP LT 130 MM HG: CPT | Mod: S$GLB,,, | Performed by: NURSE PRACTITIONER

## 2022-11-19 RX ORDER — PHENAZOPYRIDINE HYDROCHLORIDE 200 MG/1
200 TABLET, FILM COATED ORAL 3 TIMES DAILY PRN
Qty: 9 TABLET | Refills: 0 | Status: SHIPPED | OUTPATIENT
Start: 2022-11-19 | End: 2022-11-22

## 2022-11-19 RX ORDER — NITROFURANTOIN 25; 75 MG/1; MG/1
100 CAPSULE ORAL 2 TIMES DAILY
Qty: 14 CAPSULE | Refills: 0 | Status: SHIPPED | OUTPATIENT
Start: 2022-11-19 | End: 2022-11-26

## 2022-11-19 NOTE — PATIENT INSTRUCTIONS
Please return here or go to the Emergency Department for any concerns or worsening of condition.  If you were prescribed antibiotics, please take them to completion.  If you were prescribed a narcotic medication, do not drive or operate heavy equipment or machinery while taking these medications.  Please follow up with your primary care doctor or specialist as needed.  Please drink plenty of fluids.  Please get plenty of rest.  If you were prescribed Pyridium (phenazopyridine), please be aware that if you wear contact lens that this medication may stain your contacts.  While taking this medication it is recommended that you do not wear your contacts until 24 hours after your last dose.  Please follow up with your primary care doctor or specialist as needed.    If you  smoke, please stop smoking.

## 2022-11-19 NOTE — PROGRESS NOTES
"Subjective:       Patient ID: Valdez Salazar is a 62 y.o. female.    Vitals:  height is 5' 5" (1.651 m) and weight is 69.4 kg (153 lb). Her temperature is 96.8 °F (36 °C). Her blood pressure is 128/82 and her pulse is 65. Her respiration is 16 and oxygen saturation is 99%.     Chief Complaint: Urinary Frequency    Urinary Frequency   This is a new problem. The current episode started in the past 7 days (2 days ago). The problem occurs intermittently. The problem has been unchanged. The pain is at a severity of 1/10. The pain is mild. There has been no fever. She is Not sexually active. Associated symptoms include frequency and urgency. Pertinent negatives include no behavior changes, chills, discharge, flank pain, hematuria, hesitancy, nausea, possible pregnancy, sweats, vomiting, weight loss, bubble bath use, constipation, rash or withholding. She has tried increased fluids for the symptoms. The treatment provided mild relief.     Constitution: Negative for chills.   Gastrointestinal:  Negative for nausea, vomiting and constipation.   Genitourinary:  Positive for dysuria, frequency and urgency. Negative for flank pain and hematuria.   Skin:  Negative for rash.     Objective:      Results for orders placed or performed in visit on 11/19/22   POCT Urinalysis, Dipstick, Automated, W/O Scope   Result Value Ref Range    POC Blood, Urine Positive (A) Negative    POC Bilirubin, Urine Negative Negative    POC Urobilinogen, Urine normal 0.1 - 1.1    POC Ketones, Urine Negative Negative    POC Protein, Urine Negative Negative    POC Nitrates, Urine Negative Negative    POC Glucose, Urine Negative Negative    pH, UA 6.0     POC Specific Gravity, Urine 1.010 1.003 - 1.029    POC Leukocytes, Urine Positive (A) Negative       Physical Exam   Constitutional: She is oriented to person, place, and time. She appears well-developed.   HENT:   Head: Normocephalic and atraumatic.   Ears:   Right Ear: External ear normal.   Left Ear: " External ear normal.   Nose: Nose normal. No nasal deformity. No epistaxis.   Mouth/Throat: Oropharynx is clear and moist and mucous membranes are normal.   Eyes: Lids are normal.   Neck: Trachea normal and phonation normal. Neck supple.   Cardiovascular: Normal pulses.   Pulmonary/Chest: Effort normal.   Abdominal: Normal appearance and bowel sounds are normal. She exhibits no distension. Soft. There is no abdominal tenderness.   Genitourinary:         Comments:  deferred. Not necessary r/t Clinical symptom complaint of dysuria      Neurological: She is alert and oriented to person, place, and time.   Skin: Skin is warm, dry and intact.   Psychiatric: Her speech is normal and behavior is normal.   Nursing note and vitals reviewed.      Assessment:       1. Acute cystitis with hematuria    2. Urination frequency            Plan:         Acute cystitis with hematuria    Urination frequency  -     POCT Urinalysis, Dipstick, Automated, W/O Scope       Patient Instructions   Please return here or go to the Emergency Department for any concerns or worsening of condition.  If you were prescribed antibiotics, please take them to completion.  If you were prescribed a narcotic medication, do not drive or operate heavy equipment or machinery while taking these medications.  Please follow up with your primary care doctor or specialist as needed.  Please drink plenty of fluids.  Please get plenty of rest.  If you were prescribed Pyridium (phenazopyridine), please be aware that if you wear contact lens that this medication may stain your contacts.  While taking this medication it is recommended that you do not wear your contacts until 24 hours after your last dose.  Please follow up with your primary care doctor or specialist as needed.    If you  smoke, please stop smoking.

## 2022-11-23 ENCOUNTER — OFFICE VISIT (OUTPATIENT)
Dept: URGENT CARE | Facility: CLINIC | Age: 62
End: 2022-11-23
Payer: COMMERCIAL

## 2022-11-23 VITALS
SYSTOLIC BLOOD PRESSURE: 126 MMHG | OXYGEN SATURATION: 97 % | TEMPERATURE: 99 F | HEIGHT: 65 IN | WEIGHT: 152 LBS | HEART RATE: 84 BPM | BODY MASS INDEX: 25.33 KG/M2 | DIASTOLIC BLOOD PRESSURE: 76 MMHG

## 2022-11-23 DIAGNOSIS — J40 BRONCHITIS: Primary | ICD-10-CM

## 2022-11-23 PROCEDURE — 99212 OFFICE O/P EST SF 10 MIN: CPT | Mod: S$GLB,,, | Performed by: NURSE PRACTITIONER

## 2022-11-23 PROCEDURE — 1160F PR REVIEW ALL MEDS BY PRESCRIBER/CLIN PHARMACIST DOCUMENTED: ICD-10-PCS | Mod: S$GLB,,, | Performed by: NURSE PRACTITIONER

## 2022-11-23 PROCEDURE — 99212 PR OFFICE/OUTPT VISIT, EST, LEVL II, 10-19 MIN: ICD-10-PCS | Mod: S$GLB,,, | Performed by: NURSE PRACTITIONER

## 2022-11-23 PROCEDURE — 3074F SYST BP LT 130 MM HG: CPT | Mod: S$GLB,,, | Performed by: NURSE PRACTITIONER

## 2022-11-23 PROCEDURE — 1160F RVW MEDS BY RX/DR IN RCRD: CPT | Mod: S$GLB,,, | Performed by: NURSE PRACTITIONER

## 2022-11-23 PROCEDURE — 3078F PR MOST RECENT DIASTOLIC BLOOD PRESSURE < 80 MM HG: ICD-10-PCS | Mod: S$GLB,,, | Performed by: NURSE PRACTITIONER

## 2022-11-23 PROCEDURE — 1159F MED LIST DOCD IN RCRD: CPT | Mod: S$GLB,,, | Performed by: NURSE PRACTITIONER

## 2022-11-23 PROCEDURE — 3078F DIAST BP <80 MM HG: CPT | Mod: S$GLB,,, | Performed by: NURSE PRACTITIONER

## 2022-11-23 PROCEDURE — 3074F PR MOST RECENT SYSTOLIC BLOOD PRESSURE < 130 MM HG: ICD-10-PCS | Mod: S$GLB,,, | Performed by: NURSE PRACTITIONER

## 2022-11-23 PROCEDURE — 3008F BODY MASS INDEX DOCD: CPT | Mod: S$GLB,,, | Performed by: NURSE PRACTITIONER

## 2022-11-23 PROCEDURE — 1159F PR MEDICATION LIST DOCUMENTED IN MEDICAL RECORD: ICD-10-PCS | Mod: S$GLB,,, | Performed by: NURSE PRACTITIONER

## 2022-11-23 PROCEDURE — 3008F PR BODY MASS INDEX (BMI) DOCUMENTED: ICD-10-PCS | Mod: S$GLB,,, | Performed by: NURSE PRACTITIONER

## 2022-11-23 RX ORDER — PROMETHAZINE HYDROCHLORIDE AND DEXTROMETHORPHAN HYDROBROMIDE 6.25; 15 MG/5ML; MG/5ML
5 SYRUP ORAL
Qty: 118 ML | Refills: 0 | Status: SHIPPED | OUTPATIENT
Start: 2022-11-23 | End: 2022-11-23

## 2022-11-23 RX ORDER — FLUCONAZOLE 150 MG/1
150 TABLET ORAL DAILY
Qty: 1 TABLET | Refills: 0 | Status: SHIPPED | OUTPATIENT
Start: 2022-11-23 | End: 2022-11-24

## 2022-11-23 RX ORDER — FLUCONAZOLE 150 MG/1
150 TABLET ORAL DAILY
Qty: 1 TABLET | Refills: 0 | Status: SHIPPED | OUTPATIENT
Start: 2022-11-23 | End: 2022-11-23

## 2022-11-23 RX ORDER — CEFDINIR 300 MG/1
300 CAPSULE ORAL EVERY 12 HOURS
Qty: 20 CAPSULE | Refills: 0 | Status: SHIPPED | OUTPATIENT
Start: 2022-11-23 | End: 2022-12-03

## 2022-11-23 RX ORDER — CEFDINIR 300 MG/1
300 CAPSULE ORAL EVERY 12 HOURS
Qty: 20 CAPSULE | Refills: 0 | Status: SHIPPED | OUTPATIENT
Start: 2022-11-23 | End: 2022-11-23

## 2022-11-23 RX ORDER — PROMETHAZINE HYDROCHLORIDE AND DEXTROMETHORPHAN HYDROBROMIDE 6.25; 15 MG/5ML; MG/5ML
5 SYRUP ORAL
Qty: 118 ML | Refills: 0 | Status: SHIPPED | OUTPATIENT
Start: 2022-11-23 | End: 2022-12-03

## 2022-11-23 NOTE — PROGRESS NOTES
"Subjective:       Patient ID: Valdez Salazar is a 62 y.o. female.    Vitals:  height is 5' 5" (1.651 m) and weight is 68.9 kg (152 lb). Her oral temperature is 98.6 °F (37 °C). Her blood pressure is 126/76 and her pulse is 84. Her oxygen saturation is 97%.     Chief Complaint: Cough    This is a 62 y.o. female who presents today with a chief complaint of  cough x 4 days ago. Patient states home covid test was negative . Patient refused flu test.    Cough  This is a new problem. The current episode started in the past 7 days. The problem has been gradually worsening. The problem occurs constantly. The cough is Productive of sputum. Nothing aggravates the symptoms. She has tried nothing for the symptoms. The treatment provided no relief. Her past medical history is significant for asthma and pneumonia.     Respiratory:  Positive for cough.      Objective:      Physical Exam   Constitutional: She is cooperative. normal  HENT:   Head: Normocephalic and atraumatic.   Ears:   Right Ear: Hearing, tympanic membrane, external ear and ear canal normal.   Left Ear: Hearing, tympanic membrane, external ear and ear canal normal.   Nose: Nose normal. No mucosal edema, rhinorrhea or sinus tenderness.   Mouth/Throat: Uvula is midline, oropharynx is clear and moist and mucous membranes are normal. Mucous membranes are moist. Oropharynx is clear.   Eyes: Conjunctivae and lids are normal. Pupils are equal, round, and reactive to light. Extraocular movement intact   Neck: Trachea normal and phonation normal.   Cardiovascular: Normal rate, regular rhythm, normal heart sounds and normal pulses.   Pulmonary/Chest: Effort normal and breath sounds normal.   Abdominal: Normal appearance.   Musculoskeletal: Normal range of motion.         General: Normal range of motion.   Lymphadenopathy:     She has no cervical adenopathy.   Neurological: She is alert.   Skin: Skin is warm, dry and intact.   Psychiatric: Her behavior is normal. Mood " normal.       Assessment:       1. Bronchitis          Plan:         Bronchitis  -     cefdinir (OMNICEF) 300 MG capsule; Take 1 capsule (300 mg total) by mouth every 12 (twelve) hours. for 10 days  Dispense: 20 capsule; Refill: 0  -     fluconazole (DIFLUCAN) 150 MG Tab; Take 1 tablet (150 mg total) by mouth once daily. for 1 day  Dispense: 1 tablet; Refill: 0  -     promethazine-dextromethorphan (PROMETHAZINE-DM) 6.25-15 mg/5 mL Syrp; Take 5 mLs by mouth every 4 to 6 hours as needed (Cough).  Dispense: 118 mL; Refill: 0

## 2022-12-22 ENCOUNTER — OFFICE VISIT (OUTPATIENT)
Dept: PULMONOLOGY | Facility: CLINIC | Age: 62
End: 2022-12-22
Payer: COMMERCIAL

## 2022-12-22 VITALS
WEIGHT: 156 LBS | OXYGEN SATURATION: 99 % | BODY MASS INDEX: 25.96 KG/M2 | HEART RATE: 68 BPM | SYSTOLIC BLOOD PRESSURE: 118 MMHG | DIASTOLIC BLOOD PRESSURE: 70 MMHG

## 2022-12-22 DIAGNOSIS — R05.3 CHRONIC COUGH: Primary | ICD-10-CM

## 2022-12-22 DIAGNOSIS — J45.40 MODERATE PERSISTENT ASTHMA, UNSPECIFIED WHETHER COMPLICATED: ICD-10-CM

## 2022-12-22 PROCEDURE — 3008F BODY MASS INDEX DOCD: CPT | Mod: CPTII,S$GLB,, | Performed by: INTERNAL MEDICINE

## 2022-12-22 PROCEDURE — 99214 OFFICE O/P EST MOD 30 MIN: CPT | Mod: S$GLB,,, | Performed by: INTERNAL MEDICINE

## 2022-12-22 PROCEDURE — 1160F RVW MEDS BY RX/DR IN RCRD: CPT | Mod: CPTII,S$GLB,, | Performed by: INTERNAL MEDICINE

## 2022-12-22 PROCEDURE — 1159F MED LIST DOCD IN RCRD: CPT | Mod: CPTII,S$GLB,, | Performed by: INTERNAL MEDICINE

## 2022-12-22 PROCEDURE — 3078F DIAST BP <80 MM HG: CPT | Mod: CPTII,S$GLB,, | Performed by: INTERNAL MEDICINE

## 2022-12-22 PROCEDURE — 1160F PR REVIEW ALL MEDS BY PRESCRIBER/CLIN PHARMACIST DOCUMENTED: ICD-10-PCS | Mod: CPTII,S$GLB,, | Performed by: INTERNAL MEDICINE

## 2022-12-22 PROCEDURE — 3074F SYST BP LT 130 MM HG: CPT | Mod: CPTII,S$GLB,, | Performed by: INTERNAL MEDICINE

## 2022-12-22 PROCEDURE — 3078F PR MOST RECENT DIASTOLIC BLOOD PRESSURE < 80 MM HG: ICD-10-PCS | Mod: CPTII,S$GLB,, | Performed by: INTERNAL MEDICINE

## 2022-12-22 PROCEDURE — 3074F PR MOST RECENT SYSTOLIC BLOOD PRESSURE < 130 MM HG: ICD-10-PCS | Mod: CPTII,S$GLB,, | Performed by: INTERNAL MEDICINE

## 2022-12-22 PROCEDURE — 99214 PR OFFICE/OUTPT VISIT, EST, LEVL IV, 30-39 MIN: ICD-10-PCS | Mod: S$GLB,,, | Performed by: INTERNAL MEDICINE

## 2022-12-22 PROCEDURE — 1159F PR MEDICATION LIST DOCUMENTED IN MEDICAL RECORD: ICD-10-PCS | Mod: CPTII,S$GLB,, | Performed by: INTERNAL MEDICINE

## 2022-12-22 PROCEDURE — 3008F PR BODY MASS INDEX (BMI) DOCUMENTED: ICD-10-PCS | Mod: CPTII,S$GLB,, | Performed by: INTERNAL MEDICINE

## 2022-12-22 RX ORDER — PREDNISONE 10 MG/1
TABLET ORAL
Qty: 18 TABLET | Refills: 0 | Status: SHIPPED | OUTPATIENT
Start: 2022-12-22 | End: 2023-04-06

## 2022-12-22 NOTE — PROGRESS NOTES
"  Office Visit    Patient Name: Valdez Salazar  MRN: [id number]  : 1960      Reason for visit: Asthma    HPI:     2018 - Pt with h/o asthma ( has seen Dr Bill in past), has trouble with dyspnea.  Has methacholine challenge test which was "positive".  Was on singulair and BREO, still had difficulties and "thick mucus".  Has trouble when reclining.  Has seen GI to evaluate for reflux ( had esophageal "stretched").  Was referred to Dr Kelly for evaluation and now here to establish care.  Hutchins also seen Dr Hart for ENT evaluation.  Had medication changes done by dr Kelly and feels better overall.    2018 - HAs been doing fine until last few days then she had some waking episodes.  This AM had some increased chest tightness and has used rescue inhaler more.  Has had some dysphagia and saw Dr Marroquin and was started on diflucan.  Feels better with regards with this.  Has not noted wheezing but does feel tight.    12/3/2018 - Here for follow up to have left knee replacement at [facility] this Friday, breathing is ok, has some cough and mucus.  After last vii she developed laryngitis which has yet to clear (? If related to her Symbicort) - she is to see Dr Peterson tomorrow AM.    Preoperative Pulmonary Clearance    Pt was seen for preoperative clearance from a pulmonary standpoint for planned left polly replacement.  The risks of the procedure have been discussed with the patient including the risk of prolonged ventilatory support/difficulty weaning from ventilator, post-procedure pneumonia, post-procedure respiratory failure and DVT/pulmonary embolism.  The pt is currently stable from their respiratory status and they are cleared for the planned procedure at increased risk.  The risk should not be considered prohibitive.  If you have any questions please contact me.  This clearance is pending evaluation by ENT for persistent hoarseness.    2019 - Here for follow up, doing better overall.  Has seen " "ENT and was found to have a vocal cord nodule, is being treated for reflux.  Still with significant vocal issues.  Mucus has been better.  Feels that asthma control is better and cough is much better than where it was.    5/28/2019 - Here for follow up.  Asthma control has better.  Hoarseness has been better - she has found that having a small cocktail daily had helped with her symptoms.  She tells me that her vocal cord nodule is "better".  Cough is much better. No issues with her medications but does have some concerns that her vocal issues may be related to her inhalers.    7/25/2019 - Was in ER 7/6 with pleurisy (ER note reviewed) - had CTA - negative but had finding of gallstones.   Continues with URI symptoms, post nasal drip.  Has some right arm discomfort (some soreness to right deltoid muscle).  Has some mucus at times which is clear.  Has not noted reflux symptoms (still taking meds).  Asthma control has been OK overall.  Gave her Dr Comer's     12/3/2019 - Here for follow up, since last visit she had cholecystectomy, found to have thyroid nodule and was told that it was benign.  Asthma control has been good overall - does note some cough after taking her symbicort at night.  Has had a head cold recently - better now.    6/4/2020 - Here for follow up, overall asthma control has been OK.  She has no known exposure to corona virus and has been practicing social distancing.  Pt asthma is currently stable with no increases in SOB, KAHN or wheezing.  There has been no increase in use of  rescue medications.  Have reviewed medications with pt including the roles of rescue and controlling medications.  All questions answered.  Pt reports no problems with technique with inhalers.  We discussed the possibility of de-escalation of therapy if asthma control remains stable.  Does have some occasional thick mucus.    11/11/2020 - Here for follow up, overall has been doing well, some increasede symptoms with weather " changes.  Pt asthma is currently stable with no increases in SOB, KAHN or wheezing.  There has been no increase in use of  rescue medications.  Have reviewed medications with pt including the roles of rescue and controlling medications.  All questions answered.  Pt reports no problems with technique with inhalers.  We discussed the possibility of de-escalation of therapy if asthma control remains stable.  Patient has no known corona virus exposures and has been practicing social distancing.  We have discussed the virus and precautions and all questions have been answered    5/13/2021 - Here for follow up, Pt asthma is currently stable with no increases in SOB, KAHN or wheezing.  There has been no increase in use of  rescue medications.  Have reviewed medications with pt including the roles of rescue and controlling medications.  All questions answered.  Pt reports no problems with technique with inhalers.  We discussed the possibility of de-escalation of therapy if asthma control remains stable.  Did have some trouble a few weeks ago when the weather was bad but it settled down.  Patient has no known recent corona virus exposures and has been practicing social distancing.  We have discussed the virus and precautions and all questions have been answered.  She had Covid vaccine (Pfizer) and did have some neck/back pain about 4 days later.  In December her  and daughter had Covid but she was tested and was negative.  .  12/15/2021 - Here for follow up, Pt asthma is currently stable with no increases in SOB, KAHN or wheezing.  There has been no increase in use of  rescue medications.  Have reviewed medications with pt including the roles of rescue and controlling medications.  All questions answered.  Pt reports no problems with technique with inhalers.  We discussed the possibility of de-escalation of therapy if asthma control remains stable.  Overall doing well.  Currently living in [place] and planning on  building there.  She has lost > 50 # on her current diet.  Patient has no known corona virus exposures and has been practicing social distancing.  We have discussed the virus and precautions and all questions have been answered.  She has had Covid vaccine.  Has started baking again and has some occasional cough.    7/6/2022 - Here for follow up,  Has lost 80# on her diet (OPTAVIA).  Asthma has been doing well, had one episode in May.  Pt asthma is currently stable with no increases in SOB, KAHN or wheezing.  There has been no increase in use of  rescue medications.  Have reviewed medications with pt including the roles of rescue and controlling medications.  All questions answered.  Pt reports no problems with technique with inhalers.  We discussed the possibility of de-escalation of therapy if asthma control remains stable.     12/22/2022 - Here for follow up,  did have Covid and after that had an episode of bronchitis (treated through Urgent Care), better but still with some cough and mucus and chest tightness.  Going to [place] for New Year's Vick[place].  No fever, chills, sweats.  Will order prednisone course.          Asthma Flowsheet    Asthma -  Moderate  Persistent       Controlled    ACT - 21    Baseline PFT - FEV1- 95 %    Serum eosinophil count - 300  Serum IgE - < 2    Allergy testing - na   Desensitization - no    Therapy: ICS/LABA/LAMA +      PRN albuterol +                 Singulair                  Xolair                  Nucala                  Cinqair       Past Medical History    Past Medical History:   Diagnosis Date    Acid reflux     Allergic sinusitis     Arthritis     knees, hands    Asthma 2017    dr finn    Carpal tunnel syndrome on left     no surg yet    Gall stones 09/2019    found with lung w/o-- numerous stones-- surg scheduled    Hoarseness     Jaw disease     occ jaw locks    Low iron 2000    Migraines     rare now    Numbness     right hand-     Phobia     of needles-- hard to find  iv's    PONV (postoperative nausea and vomiting)     Thyroid disease     trouble swallowing-- being w/o    Vocal cord nodules        Past Surgical History    Past Surgical History:   Procedure Laterality Date    ADENOIDECTOMY      CARPAL TUNNEL RELEASE Right      SECTION      COLONOSCOPY      ELBOW SURGERY Left     and hardware removal    HYSTERECTOMY      JOINT REPLACEMENT  2018    LAPAROSCOPIC CHOLECYSTECTOMY N/A 10/25/2019    Procedure: CHOLECYSTECTOMY, LAPAROSCOPIC;  Surgeon: Douglas Quiroz III, MD;  Location: [facility] OR;  Service: General;  Laterality: N/A;    REPAIR OF RECTOCELE      TONSILLECTOMY      TOTAL KNEE ARTHROPLASTY Left 2018    Procedure: ARTHROPLASTY, KNEE, TOTAL;  Surgeon: Claude S. Williams IV, MD;  Location: [facility];  Service: Orthopedics;  Laterality: Left;       Medications      Current Outpatient Medications:     albuterol (PROVENTIL/VENTOLIN HFA) 90 mcg/actuation inhaler, INHALE 2 PUFFS EVERY 4 HOURS AS NEEDED FOR WHEEZING, Disp: 36 g, Rfl: 1    diphenhydrAMINE (BENADRYL) 25 mg capsule, Take 25 mg by mouth every 6 (six) hours as needed for Itching., Disp: , Rfl:     pantoprazole (PROTONIX) 40 MG tablet, Take 40 mg by mouth 2 (two) times daily., Disp: , Rfl:     pantoprazole (PROTONIX) 40 MG tablet, TAKE 1 TABLET BY MOUTH TWICE A DAY, Disp: 180 tablet, Rfl: 3    SYMBICORT 160-4.5 mcg/actuation HFAA, INHALE 2 PUFFS INTO THE LUNGS EVERY 12 (TWELVE) HOURS. CONTROLLER, Disp: 10.2 g, Rfl: 12    (Magic mouthwash) 1:1:1 Benadryl 12.5mg/5ml liq, aluminum & magnesium hydroxide-simehticone (Maalox), lidocaine viscous 2%, Swish and spit 5 mLs every 4 (four) hours as needed (cough , sore throat.). (Patient not taking: Reported on 2022), Disp: 300 mL, Rfl: 1    azelastine (ASTELIN) 137 mcg (0.1 %) nasal spray, 1 spray per nare twice per day, may use up to 4 times daily 1 spray per nare for congestion and post nasal drip (Patient not taking: Reported on 2022), Disp: 90  mL, Rfl: 4    azithromycin (ZITHROMAX Z-CHRISTIAN) 250 MG tablet, Take 2 po today then 1 a day for 4 days (Patient not taking: Reported on 8/16/2021), Disp: 6 tablet, Rfl: 0    famotidine (PEPCID) 20 MG tablet, Take 1 tablet (20 mg total) by mouth 2 (two) times daily., Disp: 60 tablet, Rfl: 11    inhalat. spacing dev,sm. mask (OPTICHAMBER BARBARA-SML MASK) Spcr, USE AS DIRECTED (Patient not taking: Reported on 11/23/2022), Disp: 1 each, Rfl: 3    inhalat.spacing dev,large mask (AEROCHAMBER PLUS Z STAT LG MSK) Spcr, Use with in haler (Patient not taking: Reported on 11/23/2022), Disp: 1 each, Rfl: 3    pantoprazole (PROTONIX) 40 MG tablet, TAKE 1 TABLET BY MOUTH TWICE A DAY (Patient not taking: Reported on 8/16/2022), Disp: 180 tablet, Rfl: 3    predniSONE (DELTASONE) 10 MG tablet, Take 3 a day x 3 days then 2 a day x 3 days then 1 a day x 3 days (Patient not taking: Reported on 8/16/2021), Disp: 18 tablet, Rfl: 0    sucralfate (CARAFATE) 1 gram tablet, TAKE 1 TABLET BY MOUTH 4 TIMES A DAY BEFORE MEALS AND NIGHTLY (Patient not taking: Reported on 8/16/2022), Disp: 360 tablet, Rfl: 0    traMADol (ULTRAM) 50 mg tablet, Take 1 tablet (50 mg total) by mouth every 4 (four) hours as needed for Pain. (Patient not taking: Reported on 8/16/2022), Disp: 30 tablet, Rfl:     valACYclovir (VALTREX) 1000 MG tablet, Take 1 tablet (1,000 mg total) by mouth daily as needed. (Patient not taking: Reported on 11/23/2022), Disp: 90 tablet, Rfl: 1    Allergies    Review of patient's allergies indicates:   Allergen Reactions    Codeine Nausea And Vomiting    Stadol [butorphanol tartrate] Shortness Of Breath     Pt reports stops breathing    Wiley-dur Shortness Of Breath     Stops breathing    Morphine Nausea And Vomiting     Severe.      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   HENT:  Positive for congestion. Negative for nosebleeds.         + hoarseness   Eyes:  Negative for pain.   Respiratory:  Positive for cough. Negative for hemoptysis, sputum production, shortness of breath, wheezing and stridor.    Cardiovascular:  Negative for chest pain, palpitations, orthopnea, claudication, leg swelling and PND.   Gastrointestinal:  Positive for heartburn. Negative for abdominal pain, blood in stool, constipation, diarrhea, melena, nausea and vomiting.   Genitourinary:  Negative for dysuria, flank pain, frequency, hematuria and urgency.   Musculoskeletal:  Negative for falls and myalgias.   Skin:  Negative for itching and rash.   Neurological:  Negative for sensory change, focal weakness and weakness.   Psychiatric/Behavioral:  Negative for depression. The patient is not nervous/anxious.      Physical Exam    Vitals:    12/22/22 1016   BP: 118/70   BP Location: Left arm   Patient Position: Sitting   BP Method: Medium (Manual)   Pulse: 68   SpO2: 99%   Weight: 70.8 kg (156 lb)       Physical Exam  Vitals and nursing note reviewed.   Constitutional:       General: She is not in acute distress.     Appearance: She is well-developed. She is not diaphoretic.   HENT:      Head: Normocephalic and atraumatic.      Right Ear: External ear normal.      Left Ear: External ear normal.      Nose: Nose normal.   Eyes:      Conjunctiva/sclera: Conjunctivae normal.      Pupils: Pupils  are equal, round, and reactive to light.   Neck:      Thyroid: No thyromegaly.      Vascular: No JVD.      Trachea: No tracheal deviation.   Cardiovascular:      Rate and Rhythm: Normal rate and regular rhythm.      Heart sounds: Normal heart sounds. No murmur heard.    No friction rub. No gallop.   Pulmonary:      Effort: Pulmonary effort is normal. No respiratory distress.      Breath sounds: Normal breath sounds. No stridor. No wheezing or rales.   Chest:      Chest wall: No tenderness.   Abdominal:      General: Bowel sounds are normal. There is no distension.      Palpations: Abdomen is soft.      Tenderness: There is no abdominal tenderness.   Musculoskeletal:         General: No tenderness. Normal range of motion.      Cervical back: Normal range of motion and neck supple.   Lymphadenopathy:      Cervical: No cervical adenopathy.   Skin:     General: Skin is warm and dry.   Neurological:      Mental Status: She is alert and oriented to person, place, and time.      Cranial Nerves: No cranial nerve deficit.   Psychiatric:         Behavior: Behavior normal.       Labs    Lab Results   Component Value Date    WBC 5.90 05/19/2021    HGB 13.2 05/19/2021    HCT 41.1 05/19/2021     05/19/2021       Sodium   Date Value Ref Range Status   05/19/2021 141 136 - 145 mmol/L Final     Potassium   Date Value Ref Range Status   05/19/2021 4.5 3.5 - 5.1 mmol/L Final     Chloride   Date Value Ref Range Status   05/19/2021 107 95 - 110 mmol/L Final     CO2   Date Value Ref Range Status   05/19/2021 28 23 - 29 mmol/L Final     Glucose   Date Value Ref Range Status   05/19/2021 104 70 - 110 mg/dL Final     BUN   Date Value Ref Range Status   05/19/2021 12 8 - 23 mg/dL Final     Creatinine   Date Value Ref Range Status   05/19/2021 0.6 0.5 - 1.4 mg/dL Final   07/29/2013 0.7 0.5 - 1.4 mg/dL Final     Calcium   Date Value Ref Range Status   05/19/2021 9.4 8.7 - 10.5 mg/dL Final   07/29/2013 9.1 8.7 - 10.5 mg/dL Final      Total Protein   Date Value Ref Range Status   05/19/2021 7.3 6.0 - 8.4 g/dL Final     Albumin   Date Value Ref Range Status   05/19/2021 4.1 3.5 - 5.2 g/dL Final     Total Bilirubin   Date Value Ref Range Status   05/19/2021 0.8 0.1 - 1.0 mg/dL Final     Comment:     For infants and newborns, interpretation of results should be based  on gestational age, weight and in agreement with clinical  observations.    Premature Infant recommended reference ranges:  Up to 24 hours.............<8.0 mg/dL  Up to 48 hours............<12.0 mg/dL  3-5 days..................<15.0 mg/dL  6-29 days.................<15.0 mg/dL       Alkaline Phosphatase   Date Value Ref Range Status   05/19/2021 67 55 - 135 U/L Final   07/29/2013 79 55 - 135 U/L Final     AST   Date Value Ref Range Status   05/19/2021 19 10 - 40 U/L Final   07/29/2013 17 10 - 40 U/L Final     ALT   Date Value Ref Range Status   05/19/2021 21 10 - 44 U/L Final     Anion Gap   Date Value Ref Range Status   05/19/2021 6 (L) 8 - 16 mmol/L Final   07/29/2013 13 5 - 15 meq/L Final       Xrays        Impression/Plan    Problem List Items Addressed This Visit          ENT    Chronic rhinitis  stable       Pulmonary    Chronic cough  multifactorial  stable       GI    Laryngopharyngeal reflux (LPR)   being treated          Other Visit Diagnoses       Asthma, unspecified asthma severity, unspecified whether complicated, unspecified whether persistent  Continue present medications.  Will refill medications as needed.  Instructed patient to contact us with any issues concerning their medications (cost, reactions, etc.).  Have discussed with patient about inciting conditions which may exacerbate their disease.  We did discuss possible new therapies or de-escalation of therapy (if appropriate).  Discussed the possibility of looking into alternate therapies and/or steroid sparing options  Discussed whether further evaluation of allergies would be warranted  Discussed whether  reflux could be playing a role in their symptoms  All questions answered  RTC 6 months  Patient instructed that they are to call if symptoms change or new issues develop prior to their next visit.  Has had mild exacerbation related to infection -   Will order short course steroids and follow      Vitamin D deficiency  being replaced    Laryngitis  better    Allergies  sees Dr Robin Corbin MD

## 2023-01-02 ENCOUNTER — OFFICE VISIT (OUTPATIENT)
Dept: URGENT CARE | Facility: CLINIC | Age: 63
End: 2023-01-02
Payer: COMMERCIAL

## 2023-01-02 VITALS
WEIGHT: 156 LBS | SYSTOLIC BLOOD PRESSURE: 108 MMHG | HEART RATE: 71 BPM | TEMPERATURE: 98 F | BODY MASS INDEX: 25.99 KG/M2 | OXYGEN SATURATION: 96 % | DIASTOLIC BLOOD PRESSURE: 66 MMHG | HEIGHT: 65 IN

## 2023-01-02 DIAGNOSIS — R35.0 FREQUENT URINATION: ICD-10-CM

## 2023-01-02 DIAGNOSIS — N39.0 URINARY TRACT INFECTION WITHOUT HEMATURIA, SITE UNSPECIFIED: Primary | ICD-10-CM

## 2023-01-02 LAB
BILIRUB UR QL STRIP: POSITIVE
GLUCOSE UR QL STRIP: NEGATIVE
KETONES UR QL STRIP: NEGATIVE
LEUKOCYTE ESTERASE UR QL STRIP: NEGATIVE
PH, POC UA: 6.5
POC BLOOD, URINE: POSITIVE
POC NITRATES, URINE: POSITIVE
PROT UR QL STRIP: POSITIVE
SP GR UR STRIP: 1 (ref 1–1.03)
UROBILINOGEN UR STRIP-ACNC: 1 (ref 0.1–1.1)

## 2023-01-02 PROCEDURE — 99212 OFFICE O/P EST SF 10 MIN: CPT | Mod: S$GLB,,, | Performed by: NURSE PRACTITIONER

## 2023-01-02 PROCEDURE — 1160F PR REVIEW ALL MEDS BY PRESCRIBER/CLIN PHARMACIST DOCUMENTED: ICD-10-PCS | Mod: S$GLB,,, | Performed by: NURSE PRACTITIONER

## 2023-01-02 PROCEDURE — 81003 POCT URINALYSIS, DIPSTICK, AUTOMATED, W/O SCOPE: ICD-10-PCS | Mod: QW,S$GLB,, | Performed by: NURSE PRACTITIONER

## 2023-01-02 PROCEDURE — 3008F PR BODY MASS INDEX (BMI) DOCUMENTED: ICD-10-PCS | Mod: S$GLB,,, | Performed by: NURSE PRACTITIONER

## 2023-01-02 PROCEDURE — 1159F MED LIST DOCD IN RCRD: CPT | Mod: S$GLB,,, | Performed by: NURSE PRACTITIONER

## 2023-01-02 PROCEDURE — 1160F RVW MEDS BY RX/DR IN RCRD: CPT | Mod: S$GLB,,, | Performed by: NURSE PRACTITIONER

## 2023-01-02 PROCEDURE — 3074F SYST BP LT 130 MM HG: CPT | Mod: S$GLB,,, | Performed by: NURSE PRACTITIONER

## 2023-01-02 PROCEDURE — 3074F PR MOST RECENT SYSTOLIC BLOOD PRESSURE < 130 MM HG: ICD-10-PCS | Mod: S$GLB,,, | Performed by: NURSE PRACTITIONER

## 2023-01-02 PROCEDURE — 3078F DIAST BP <80 MM HG: CPT | Mod: S$GLB,,, | Performed by: NURSE PRACTITIONER

## 2023-01-02 PROCEDURE — 3078F PR MOST RECENT DIASTOLIC BLOOD PRESSURE < 80 MM HG: ICD-10-PCS | Mod: S$GLB,,, | Performed by: NURSE PRACTITIONER

## 2023-01-02 PROCEDURE — 81003 URINALYSIS AUTO W/O SCOPE: CPT | Mod: QW,S$GLB,, | Performed by: NURSE PRACTITIONER

## 2023-01-02 PROCEDURE — 99212 PR OFFICE/OUTPT VISIT, EST, LEVL II, 10-19 MIN: ICD-10-PCS | Mod: S$GLB,,, | Performed by: NURSE PRACTITIONER

## 2023-01-02 PROCEDURE — 1159F PR MEDICATION LIST DOCUMENTED IN MEDICAL RECORD: ICD-10-PCS | Mod: S$GLB,,, | Performed by: NURSE PRACTITIONER

## 2023-01-02 PROCEDURE — 3008F BODY MASS INDEX DOCD: CPT | Mod: S$GLB,,, | Performed by: NURSE PRACTITIONER

## 2023-01-02 RX ORDER — NITROFURANTOIN 25; 75 MG/1; MG/1
100 CAPSULE ORAL EVERY 12 HOURS
Qty: 14 CAPSULE | Refills: 0 | Status: SHIPPED | OUTPATIENT
Start: 2023-01-02 | End: 2023-01-09

## 2023-01-02 NOTE — PROGRESS NOTES
"Subjective:       Patient ID: Valdez Salazar is a 62 y.o. female.    Vitals:  height is 5' 5" (1.651 m) and weight is 70.8 kg (156 lb). Her oral temperature is 98.1 °F (36.7 °C). Her blood pressure is 108/66 and her pulse is 71. Her oxygen saturation is 96%.     Chief Complaint: Urinary Frequency    This is a 62 y.o. female who presents today with a chief complaint of urinary frequency x 1 day.    Urinary Frequency   This is a new problem. The current episode started yesterday. The problem has been gradually worsening. The quality of the pain is described as burning. There has been no fever. Associated symptoms include frequency. Pertinent negatives include no urgency.     Genitourinary:  Positive for frequency. Negative for urgency.         Objective:      Physical Exam   Constitutional: She is cooperative. normalawake  HENT:   Head: Normocephalic and atraumatic.   Eyes: Conjunctivae are normal. Extraocular movement intact   Neck: Neck supple.   Cardiovascular: Normal rate, regular rhythm, normal heart sounds and normal pulses.   Pulmonary/Chest: Effort normal and breath sounds normal.   Abdominal: Normal appearance. There is no left CVA tenderness and no right CVA tenderness.   Musculoskeletal: Normal range of motion.         General: Normal range of motion.   Neurological: She is alert.   Skin: Skin is warm, dry and intact.   Psychiatric: Her behavior is normal. Mood normal.   Vitals reviewed.      Results for orders placed or performed in visit on 01/02/23   POCT Urinalysis, Dipstick, Automated, W/O Scope   Result Value Ref Range    POC Blood, Urine Positive (A) Negative    POC Bilirubin, Urine Positive (A) Negative    POC Urobilinogen, Urine 1.0 0.1 - 1.1    POC Ketones, Urine Negative Negative    POC Protein, Urine Positive (A) Negative    POC Nitrates, Urine Positive (A) Negative    POC Glucose, Urine Negative Negative    pH, UA 6.5     POC Specific Gravity, Urine 1.005 1.003 - 1.029    POC Leukocytes, " Urine Negative Negative    No results found.     Past medical history and current medications reviewed.       Assessment:           1. Urinary tract infection without hematuria, site unspecified    2. Frequent urination              Plan:         Urinary tract infection without hematuria, site unspecified  -     nitrofurantoin, macrocrystal-monohydrate, (MACROBID) 100 MG capsule; Take 1 capsule (100 mg total) by mouth every 12 (twelve) hours. for 7 days  Dispense: 14 capsule; Refill: 0    Frequent urination  -     POCT Urinalysis, Dipstick, Automated, W/O Scope             There are no Patient Instructions on file for this visit.

## 2023-04-06 ENCOUNTER — OFFICE VISIT (OUTPATIENT)
Dept: URGENT CARE | Facility: CLINIC | Age: 63
End: 2023-04-06
Payer: COMMERCIAL

## 2023-04-06 VITALS
TEMPERATURE: 98 F | OXYGEN SATURATION: 97 % | BODY MASS INDEX: 25.83 KG/M2 | HEIGHT: 65 IN | WEIGHT: 155 LBS | HEART RATE: 70 BPM

## 2023-04-06 DIAGNOSIS — R31.9 URINARY TRACT INFECTION WITH HEMATURIA, SITE UNSPECIFIED: ICD-10-CM

## 2023-04-06 DIAGNOSIS — R35.0 FREQUENCY OF URINATION: Primary | ICD-10-CM

## 2023-04-06 DIAGNOSIS — N39.0 URINARY TRACT INFECTION WITH HEMATURIA, SITE UNSPECIFIED: ICD-10-CM

## 2023-04-06 DIAGNOSIS — R39.15 URGENCY OF URINATION: ICD-10-CM

## 2023-04-06 LAB
BILIRUB UR QL STRIP: POSITIVE
GLUCOSE UR QL STRIP: POSITIVE
KETONES UR QL STRIP: NEGATIVE
LEUKOCYTE ESTERASE UR QL STRIP: POSITIVE
PH, POC UA: 6
POC BLOOD, URINE: POSITIVE
POC NITRATES, URINE: POSITIVE
PROT UR QL STRIP: POSITIVE
SP GR UR STRIP: 1.02 (ref 1–1.03)
UROBILINOGEN UR STRIP-ACNC: POSITIVE (ref 0.1–1.1)

## 2023-04-06 PROCEDURE — 99213 OFFICE O/P EST LOW 20 MIN: CPT | Mod: ,,, | Performed by: NURSE PRACTITIONER

## 2023-04-06 PROCEDURE — 81003 POCT URINALYSIS, DIPSTICK, AUTOMATED, W/O SCOPE: ICD-10-PCS | Mod: QW,,, | Performed by: NURSE PRACTITIONER

## 2023-04-06 PROCEDURE — 81003 URINALYSIS AUTO W/O SCOPE: CPT | Mod: QW,,, | Performed by: NURSE PRACTITIONER

## 2023-04-06 PROCEDURE — 99213 PR OFFICE/OUTPT VISIT, EST, LEVL III, 20-29 MIN: ICD-10-PCS | Mod: ,,, | Performed by: NURSE PRACTITIONER

## 2023-04-06 RX ORDER — PHENAZOPYRIDINE HYDROCHLORIDE 200 MG/1
200 TABLET, FILM COATED ORAL 3 TIMES DAILY PRN
Qty: 20 TABLET | Refills: 0 | Status: SHIPPED | OUTPATIENT
Start: 2023-04-06 | End: 2023-06-22 | Stop reason: SDUPTHER

## 2023-04-06 RX ORDER — FLUCONAZOLE 150 MG/1
150 TABLET ORAL WEEKLY
Qty: 3 TABLET | Refills: 0 | Status: SHIPPED | OUTPATIENT
Start: 2023-04-06 | End: 2023-04-21

## 2023-04-06 RX ORDER — NITROFURANTOIN 25; 75 MG/1; MG/1
100 CAPSULE ORAL 2 TIMES DAILY
Qty: 20 CAPSULE | Refills: 0 | Status: SHIPPED | OUTPATIENT
Start: 2023-04-06 | End: 2023-04-16

## 2023-04-06 NOTE — PROGRESS NOTES
"Subjective:      Patient ID: Valdez Salazar is a 62 y.o. female.    Vitals:  height is 5' 5" (1.651 m) and weight is 70.3 kg (155 lb). Her oral temperature is 98 °F (36.7 °C). Her pulse is 70. Her oxygen saturation is 97%.     Chief Complaint: Urinary Frequency (Urine frequency and urgency.  Had bladder suspension x 15 years ago and she thinks that it may be failing and now getting urinary tract infections more frequently.)    This is a 62 y.o. female who presents today with a chief complaint of Urine frequency and urgency.  Had bladder suspension x 15 years ago and she thinks that it may be failing and now getting urinary tract infections more frequently.    Patient presents with:  Urinary Frequency: Urine frequency and urgency.  Had bladder suspension x 15 years ago and she thinks that it may be failing and now getting urinary tract infections more frequently.         Urinary Frequency   This is a recurrent problem. The current episode started in the past 7 days. The pain is mild. There has been no fever. She is Sexually active. Associated symptoms include frequency and urgency. Treatments tried: AZO. Her past medical history is significant for a urological procedure.     Genitourinary:  Positive for frequency and urgency.    Objective:     Physical Exam   Constitutional: She is oriented to person, place, and time. She appears well-developed. She is cooperative.  Non-toxic appearance. She does not appear ill. No distress.   HENT:   Head: Normocephalic and atraumatic.   Ears:   Right Ear: Hearing, external ear and ear canal normal. Tympanic membrane is injected and erythematous.   Left Ear: Hearing, external ear and ear canal normal. Tympanic membrane is injected and erythematous.   Nose: No mucosal edema, rhinorrhea or nasal deformity. No epistaxis. Right sinus exhibits no maxillary sinus tenderness and no frontal sinus tenderness. Left sinus exhibits no maxillary sinus tenderness and no frontal sinus " tenderness.   Mouth/Throat: Uvula is midline and mucous membranes are normal. No trismus in the jaw. Normal dentition. No uvula swelling. No oropharyngeal exudate, posterior oropharyngeal edema or posterior oropharyngeal erythema.   Eyes: Conjunctivae and lids are normal. No scleral icterus.   Neck: Trachea normal and phonation normal. Neck supple. No edema present. No erythema present. No neck rigidity present.   Cardiovascular: Normal rate, regular rhythm, normal heart sounds and normal pulses.   Pulmonary/Chest: Effort normal and breath sounds normal. No respiratory distress. She has no decreased breath sounds. She has no rhonchi.   Abdominal: Normal appearance.   Musculoskeletal: Normal range of motion.         General: No deformity. Normal range of motion.   Neurological: She is alert and oriented to person, place, and time. She exhibits normal muscle tone. Coordination normal.   Skin: Skin is warm, dry, intact, not diaphoretic and not pale.   Psychiatric: Her speech is normal and behavior is normal. Judgment and thought content normal.   Nursing note and vitals reviewed.    History of bladder suspension, worried it may be failing.  Will be contacting gyn/urology in [place]    Results for orders placed or performed in visit on 04/06/23   POCT Urinalysis, Dipstick, Automated, W/O Scope   Result Value Ref Range    POC Blood, Urine Positive (A) Negative    POC Bilirubin, Urine Positive (A) Negative    POC Urobilinogen, Urine Positive 0.1 - 1.1    POC Ketones, Urine Negative Negative    POC Protein, Urine Positive (A) Negative    POC Nitrates, Urine Positive (A) Negative    POC Glucose, Urine Positive (A) Negative    pH, UA 6.0     POC Specific Gravity, Urine 1.020 1.003 - 1.029    POC Leukocytes, Urine Positive (A) Negative       Assessment:     1. Frequency of urination    2. Urgency of urination    3. Urinary tract infection with hematuria, site unspecified        Plan:       Frequency of urination  -     POCT  Urinalysis, Dipstick, Automated, W/O Scope    Urgency of urination  -     POCT Urinalysis, Dipstick, Automated, W/O Scope    Urinary tract infection with hematuria, site unspecified  -     nitrofurantoin, macrocrystal-monohydrate, (MACROBID) 100 MG capsule; Take 1 capsule (100 mg total) by mouth 2 (two) times daily. for 10 days  Dispense: 20 capsule; Refill: 0  -     phenazopyridine (PYRIDIUM) 200 MG tablet; Take 1 tablet (200 mg total) by mouth 3 (three) times daily as needed for Pain.  Dispense: 20 tablet; Refill: 0  -     fluconazole (DIFLUCAN) 150 MG Tab; Take 1 tablet (150 mg total) by mouth once a week. for 3 doses  Dispense: 3 tablet; Refill: 0

## 2023-04-26 ENCOUNTER — TELEPHONE (OUTPATIENT)
Dept: UROGYNECOLOGY | Facility: CLINIC | Age: 63
End: 2023-04-26
Payer: MEDICARE

## 2023-04-26 DIAGNOSIS — Z12.31 ENCOUNTER FOR SCREENING MAMMOGRAM FOR MALIGNANT NEOPLASM OF BREAST: Primary | ICD-10-CM

## 2023-04-26 NOTE — TELEPHONE ENCOUNTER
----- Message from Jennifer Foster sent at 4/26/2023 10:36 AM CDT -----  Contact: [phone]  Type: Needs Medical Advice  Who Called:  Pt     Best Call Back Number: [phone]    Additional Information: Pt is calling to schedule appt. Pt did not want to state what for. Pt stated she was apt of Dr Peterson 15 years ago. Pls call back and advise

## 2023-05-04 ENCOUNTER — HOSPITAL ENCOUNTER (OUTPATIENT)
Dept: RADIOLOGY | Facility: HOSPITAL | Age: 63
Discharge: HOME | End: 2023-05-04
Attending: OBSTETRICS & GYNECOLOGY
Payer: MEDICARE

## 2023-05-04 DIAGNOSIS — Z12.31 ENCOUNTER FOR SCREENING MAMMOGRAM FOR MALIGNANT NEOPLASM OF BREAST: ICD-10-CM

## 2023-05-04 PROCEDURE — 77067 SCR MAMMO BI INCL CAD: CPT | Mod: TC,PO

## 2023-05-15 ENCOUNTER — TELEPHONE (OUTPATIENT)
Dept: PULMONOLOGY | Facility: HOSPITAL | Age: 63
End: 2023-05-15

## 2023-05-15 ENCOUNTER — TELEPHONE (OUTPATIENT)
Dept: PULMONOLOGY | Facility: CLINIC | Age: 63
End: 2023-05-15

## 2023-05-15 RX ORDER — PREDNISONE 10 MG/1
TABLET ORAL
Qty: 18 TABLET | Refills: 0 | Status: SHIPPED | OUTPATIENT
Start: 2023-05-15 | End: 2023-08-31 | Stop reason: ALTCHOICE

## 2023-05-15 RX ORDER — AMOXICILLIN AND CLAVULANATE POTASSIUM 875; 125 MG/1; MG/1
1 TABLET, FILM COATED ORAL 2 TIMES DAILY
Qty: 14 TABLET | Refills: 0 | Status: SHIPPED | OUTPATIENT
Start: 2023-05-15 | End: 2023-08-31

## 2023-05-16 ENCOUNTER — TELEPHONE (OUTPATIENT)
Dept: PULMONOLOGY | Facility: CLINIC | Age: 63
End: 2023-05-16

## 2023-05-16 RX ORDER — NYSTATIN 100000 [USP'U]/ML
5 SUSPENSION ORAL 4 TIMES DAILY
Qty: 200 ML | Refills: 0 | Status: SHIPPED | OUTPATIENT
Start: 2023-05-16 | End: 2023-05-26

## 2023-05-16 NOTE — TELEPHONE ENCOUNTER
----- Message from Thee Corbin MD sent at 5/15/2023  2:52 PM CDT -----  Regarding: RE: patient symptoms  Contact: Valdez, [phone]  Sent RX for augmentin and a short prednisone taper and see how she responds    ----- Message -----  From: Chavezmattalexander Gray  Sent: 5/15/2023  10:39 AM CDT  To: Thee Corbin MD  Subject: patient symptoms                                 The patient called stating she has been having congestion, coughing up yellow mucus, and runny eyes.  She took Benadryl for 1 week and no improvement.  Please advise if she needs an appointment or if you would like to call in medication.  Thanks in advance for your assistnace.

## 2023-05-17 ENCOUNTER — TELEPHONE (OUTPATIENT)
Dept: PULMONOLOGY | Facility: CLINIC | Age: 63
End: 2023-05-17

## 2023-05-17 NOTE — TELEPHONE ENCOUNTER
Rosannechandrika tracy hamilton said that she has a yeast infection different kind an does not need the nystatin need flucanazole 2 tablets take one when she starts the antibodics an one when she finishes .

## 2023-05-18 RX ORDER — FLUCONAZOLE 200 MG/1
200 TABLET ORAL DAILY
Qty: 2 TABLET | Refills: 0 | Status: SHIPPED | OUTPATIENT
Start: 2023-05-18 | End: 2023-05-20

## 2023-05-23 ENCOUNTER — OFFICE VISIT (OUTPATIENT)
Dept: UROGYNECOLOGY | Facility: CLINIC | Age: 63
End: 2023-05-23
Payer: MEDICARE

## 2023-05-23 VITALS
HEIGHT: 65 IN | WEIGHT: 155 LBS | DIASTOLIC BLOOD PRESSURE: 60 MMHG | SYSTOLIC BLOOD PRESSURE: 99 MMHG | HEART RATE: 60 BPM | BODY MASS INDEX: 25.83 KG/M2

## 2023-05-23 DIAGNOSIS — Z87.440 HISTORY OF RECURRENT UTI (URINARY TRACT INFECTION): ICD-10-CM

## 2023-05-23 DIAGNOSIS — N95.2 ATROPHIC VAGINITIS: ICD-10-CM

## 2023-05-23 DIAGNOSIS — N94.19 DYSPAREUNIA DUE TO MEDICAL CONDITION IN FEMALE: ICD-10-CM

## 2023-05-23 DIAGNOSIS — R35.0 URINARY FREQUENCY: Primary | ICD-10-CM

## 2023-05-23 LAB
BILIRUBIN, UA POC OHS: NEGATIVE
BLOOD, UA POC OHS: NEGATIVE
CLARITY, UA POC OHS: CLEAR
COLOR, UA POC OHS: YELLOW
GLUCOSE, UA POC OHS: NEGATIVE
KETONES, UA POC OHS: NEGATIVE
LEUKOCYTES, UA POC OHS: NEGATIVE
NITRITE, UA POC OHS: NEGATIVE
PH, UA POC OHS: 5.5
PROTEIN, UA POC OHS: NEGATIVE
SPECIFIC GRAVITY, UA POC OHS: 1.02
UROBILINOGEN, UA POC OHS: 0.2

## 2023-05-23 PROCEDURE — 99213 OFFICE O/P EST LOW 20 MIN: CPT | Mod: PBBFAC,PO | Performed by: NURSE PRACTITIONER

## 2023-05-23 PROCEDURE — 99999 PR PBB SHADOW E&M-EST. PATIENT-LVL III: ICD-10-PCS | Mod: PBBFAC,,, | Performed by: NURSE PRACTITIONER

## 2023-05-23 PROCEDURE — 99214 PR OFFICE/OUTPT VISIT, EST, LEVL IV, 30-39 MIN: ICD-10-PCS | Mod: S$PBB,,, | Performed by: NURSE PRACTITIONER

## 2023-05-23 PROCEDURE — 81003 URINALYSIS AUTO W/O SCOPE: CPT | Mod: PBBFAC,PO | Performed by: NURSE PRACTITIONER

## 2023-05-23 PROCEDURE — 99214 OFFICE O/P EST MOD 30 MIN: CPT | Mod: S$PBB,,, | Performed by: NURSE PRACTITIONER

## 2023-05-23 PROCEDURE — 99999 PR PBB SHADOW E&M-EST. PATIENT-LVL III: CPT | Mod: PBBFAC,,, | Performed by: NURSE PRACTITIONER

## 2023-05-23 RX ORDER — ESTRADIOL 0.1 MG/G
CREAM VAGINAL
Qty: 42.5 G | Refills: 3 | Status: SHIPPED | OUTPATIENT
Start: 2023-05-23

## 2023-05-23 NOTE — PROGRESS NOTES
Subjective:       Patient ID: Valdez Salazar is a 63 y.o. female.    Chief Complaint: Cystitis      Valdez Salazar is a 63 y.o. female.  Who presents today for consult in regards to cystitis.  She was seen Dr. Peterson in the past close to 15 years ago at which time he did a cystocele rectocele repair as well as hysterectomy on the patient.  The patient felt that she was doing fine and perfect since her surgery until around October/November timeframe.  At that time she started having increased frequency urgency and dysuria.  She went to urgent care and was told that she had a UTI and was given antibiotics.  She had the same scenario happen in December on December 31st and again went to urgent Care and again was given antibiotics.  Then in April she had a similar occurrence went to urgent Care was placed on antibiotics after she finished her antibiotic she went to see her OBGYN Dr. Chavarria.  Dr. Chavarria sent her urine for culture and it did show bacteria, but the patient does not remember what bacteria grew.  Patient is concerned that her cystocele and her rectocele have returned and this is the cause of her problems.  She has frequency every hour to 2 during the day.  She is nocturia x1.  She denies any incontinence.  She does have some postvoid fullness at times.  She denies any history of kidney stones.  She drinks mostly water during the day.  She was sexually active and does have some dyspareunia at times.  She denies any vaginal discharge or bleeding, but does feel that when she has to have a bowel movement she needs to push everything back up to have a bowel movement.  She was currently on Augmentin for an upper respiratory infection and her UA today is negative.  She denies any symptoms today as well.  She was amenable to the idea of beginning Estrace cream to see if this does not help resolve a couple of her symptoms.  She was also ready to proceed with an exam to be evaluated for prolapse.    Review of  Systems   Constitutional:  Negative for activity change, fever and unexpected weight change.   HENT:  Negative for hearing loss.    Eyes:  Negative for visual disturbance.   Respiratory:  Negative for shortness of breath and wheezing.    Cardiovascular:  Negative for chest pain, palpitations and leg swelling.   Gastrointestinal:  Negative for abdominal pain, constipation and diarrhea.   Genitourinary:  Positive for frequency. Negative for dyspareunia, dysuria, urgency, vaginal bleeding and vaginal discharge.   Musculoskeletal:  Negative for gait problem and neck pain.   Skin:  Negative for rash and wound.   Allergic/Immunologic: Negative for immunocompromised state.   Neurological:  Negative for tremors, speech difficulty and weakness.   Hematological:  Does not bruise/bleed easily.   Psychiatric/Behavioral:  Negative for agitation and confusion.      Objective:      Physical Exam  Vitals reviewed. Exam conducted with a chaperone present.   Constitutional:       General: She is not in acute distress.     Appearance: She is well-developed.   HENT:      Head: Normocephalic and atraumatic.   Neck:      Thyroid: No thyromegaly.   Pulmonary:      Effort: Pulmonary effort is normal. No respiratory distress.   Abdominal:      Palpations: Abdomen is soft.      Tenderness: There is no abdominal tenderness.      Hernia: No hernia is present.   Musculoskeletal:         General: Normal range of motion.      Cervical back: Normal range of motion.   Skin:     General: Skin is warm and dry.      Findings: No rash.   Neurological:      Mental Status: She is alert and oriented to person, place, and time.   Psychiatric:         Mood and Affect: Mood normal.         Behavior: Behavior normal.         Thought Content: Thought content normal.     Pelvic Exam:  V: No lesions. No palpable nodes.   Va:  No discharge or bleeding.  Good length.  What appears to be some redundant tissue posteriorly but not a true rectocele.  Meatus:No  caruncle or stenosis  Urethra: Non tender. No suburethral masses.  Cx/Cuff: Normal   Uterus:  Surgically absent  Ad: No mass or tenderness.  Levators :Symmetrical. Normal tone. Non tender.  BL: Non tender  RV: No hemorrhoids.      Assessment:       1. Urinary frequency    2. Atrophic vaginitis    3. History of recurrent UTI (urinary tract infection)    4. Dyspareunia due to medical condition in female        Plan:       Urinary frequency-monitor  -     POCT Urinalysis(Instrument)    Atrophic vaginitis-begin Estrace cream as noted below  -     estradioL (ESTRACE) 0.01 % (0.1 mg/gram) vaginal cream; Apply 1 fingertipful to vaginal area nightly x 2 weeks then use on MWF  Dispense: 42.5 g; Refill: 3    History of recurrent UTI (urinary tract infection)- monitor.  Patient will let us know if she was experiencing symptoms of UTI, and will come to our office to drop off a urine for culture    Dyspareunia due to medical condition in female-trial of Estrace cream as noted above    RTC p.r.n.

## 2023-06-21 ENCOUNTER — TELEPHONE (OUTPATIENT)
Dept: FAMILY MEDICINE | Facility: CLINIC | Age: 63
End: 2023-06-21
Payer: MEDICARE

## 2023-06-21 NOTE — TELEPHONE ENCOUNTER
----- Message from Luciano Saucedo sent at 6/21/2023 11:47 AM CDT -----  Contact: pt  Type: Needs Medical Advice  Who Called:  pt  Best Call Back Number: [phone]    Additional Information: Pt is calling the office she last seen the nurse about some symptoms of bladder infection she was having nurse told her if she ever has symptoms again to call the office.Please call back and advise.

## 2023-06-22 ENCOUNTER — OFFICE VISIT (OUTPATIENT)
Dept: URGENT CARE | Facility: CLINIC | Age: 63
End: 2023-06-22
Payer: COMMERCIAL

## 2023-06-22 ENCOUNTER — OFFICE VISIT (OUTPATIENT)
Dept: PULMONOLOGY | Facility: CLINIC | Age: 63
End: 2023-06-22
Payer: MEDICARE

## 2023-06-22 VITALS
SYSTOLIC BLOOD PRESSURE: 118 MMHG | BODY MASS INDEX: 25.66 KG/M2 | WEIGHT: 154 LBS | HEART RATE: 68 BPM | DIASTOLIC BLOOD PRESSURE: 68 MMHG | OXYGEN SATURATION: 95 % | TEMPERATURE: 99 F | HEIGHT: 65 IN

## 2023-06-22 VITALS
BODY MASS INDEX: 26.78 KG/M2 | WEIGHT: 160.88 LBS | DIASTOLIC BLOOD PRESSURE: 70 MMHG | SYSTOLIC BLOOD PRESSURE: 120 MMHG | OXYGEN SATURATION: 97 % | HEART RATE: 71 BPM

## 2023-06-22 DIAGNOSIS — N39.0 URINARY TRACT INFECTION WITH HEMATURIA, SITE UNSPECIFIED: ICD-10-CM

## 2023-06-22 DIAGNOSIS — R35.0 FREQUENT URINATION: Primary | ICD-10-CM

## 2023-06-22 DIAGNOSIS — J45.40 MODERATE PERSISTENT ASTHMA, UNSPECIFIED WHETHER COMPLICATED: Primary | ICD-10-CM

## 2023-06-22 DIAGNOSIS — R05.3 CHRONIC COUGH: ICD-10-CM

## 2023-06-22 DIAGNOSIS — R31.9 URINARY TRACT INFECTION WITH HEMATURIA, SITE UNSPECIFIED: ICD-10-CM

## 2023-06-22 LAB
BILIRUB UR QL STRIP: POSITIVE
GLUCOSE UR QL STRIP: POSITIVE
KETONES UR QL STRIP: NEGATIVE
LEUKOCYTE ESTERASE UR QL STRIP: POSITIVE
PH, POC UA: 6
POC BLOOD, URINE: POSITIVE
POC NITRATES, URINE: POSITIVE
PROT UR QL STRIP: POSITIVE
SP GR UR STRIP: 1.02 (ref 1–1.03)
UROBILINOGEN UR STRIP-ACNC: ABNORMAL (ref 0.1–1.1)

## 2023-06-22 PROCEDURE — 99213 OFFICE O/P EST LOW 20 MIN: CPT | Mod: S$GLB,,,

## 2023-06-22 PROCEDURE — 87086 URINE CULTURE/COLONY COUNT: CPT

## 2023-06-22 PROCEDURE — 81003 URINALYSIS AUTO W/O SCOPE: CPT | Mod: QW,S$GLB,,

## 2023-06-22 PROCEDURE — 99213 PR OFFICE/OUTPT VISIT, EST, LEVL III, 20-29 MIN: ICD-10-PCS | Mod: S$GLB,,,

## 2023-06-22 PROCEDURE — 99214 PR OFFICE/OUTPT VISIT, EST, LEVL IV, 30-39 MIN: ICD-10-PCS | Mod: S$GLB,,, | Performed by: INTERNAL MEDICINE

## 2023-06-22 PROCEDURE — 81003 POCT URINALYSIS, DIPSTICK, AUTOMATED, W/O SCOPE: ICD-10-PCS | Mod: QW,S$GLB,,

## 2023-06-22 PROCEDURE — 99214 OFFICE O/P EST MOD 30 MIN: CPT | Mod: S$GLB,,, | Performed by: INTERNAL MEDICINE

## 2023-06-22 RX ORDER — PHENAZOPYRIDINE HYDROCHLORIDE 200 MG/1
200 TABLET, FILM COATED ORAL 3 TIMES DAILY PRN
Qty: 20 TABLET | Refills: 0 | Status: SHIPPED | OUTPATIENT
Start: 2023-06-22 | End: 2024-06-21

## 2023-06-22 RX ORDER — FLUCONAZOLE 150 MG/1
TABLET ORAL
Qty: 2 TABLET | Refills: 0 | Status: SHIPPED | OUTPATIENT
Start: 2023-06-22 | End: 2023-08-31 | Stop reason: ALTCHOICE

## 2023-06-22 RX ORDER — NITROFURANTOIN 25; 75 MG/1; MG/1
100 CAPSULE ORAL 2 TIMES DAILY
Qty: 20 CAPSULE | Refills: 0 | Status: SHIPPED | OUTPATIENT
Start: 2023-06-22 | End: 2023-07-02

## 2023-06-22 RX ORDER — NITROFURANTOIN 25; 75 MG/1; MG/1
100 CAPSULE ORAL 2 TIMES DAILY
COMMUNITY
Start: 2023-04-24 | End: 2023-08-31 | Stop reason: ALTCHOICE

## 2023-06-22 NOTE — PROGRESS NOTES
"  Office Visit    Patient Name: Valdez Salazar  MRN: [id number]  : 1960      Reason for visit: Asthma    HPI:     2018 - Pt with h/o asthma ( has seen Dr Bill in past), has trouble with dyspnea.  Has methacholine challenge test which was "positive".  Was on singulair and BREO, still had difficulties and "thick mucus".  Has trouble when reclining.  Has seen GI to evaluate for reflux ( had esophageal "stretched").  Was referred to Dr Kelly for evaluation and now here to establish care.  Hutchins also seen Dr Hart for ENT evaluation.  Had medication changes done by dr Kelly and feels better overall.    2018 - HAs been doing fine until last few days then she had some waking episodes.  This AM had some increased chest tightness and has used rescue inhaler more.  Has had some dysphagia and saw Dr Marroquin and was started on diflucan.  Feels better with regards with this.  Has not noted wheezing but does feel tight.    12/3/2018 - Here for follow up to have left knee replacement at [facility] this Friday, breathing is ok, has some cough and mucus.  After last vii she developed laryngitis which has yet to clear (? If related to her Symbicort) - she is to see Dr Peterson tomorrow AM.    Preoperative Pulmonary Clearance    Pt was seen for preoperative clearance from a pulmonary standpoint for planned left polly replacement.  The risks of the procedure have been discussed with the patient including the risk of prolonged ventilatory support/difficulty weaning from ventilator, post-procedure pneumonia, post-procedure respiratory failure and DVT/pulmonary embolism.  The pt is currently stable from their respiratory status and they are cleared for the planned procedure at increased risk.  The risk should not be considered prohibitive.  If you have any questions please contact me.  This clearance is pending evaluation by ENT for persistent hoarseness.    2019 - Here for follow up, doing better overall.  Has seen " "ENT and was found to have a vocal cord nodule, is being treated for reflux.  Still with significant vocal issues.  Mucus has been better.  Feels that asthma control is better and cough is much better than where it was.    5/28/2019 - Here for follow up.  Asthma control has better.  Hoarseness has been better - she has found that having a small cocktail daily had helped with her symptoms.  She tells me that her vocal cord nodule is "better".  Cough is much better. No issues with her medications but does have some concerns that her vocal issues may be related to her inhalers.    7/25/2019 - Was in ER 7/6 with pleurisy (ER note reviewed) - had CTA - negative but had finding of gallstones.   Continues with URI symptoms, post nasal drip.  Has some right arm discomfort (some soreness to right deltoid muscle).  Has some mucus at times which is clear.  Has not noted reflux symptoms (still taking meds).  Asthma control has been OK overall.  Gave her Dr Comer's     12/3/2019 - Here for follow up, since last visit she had cholecystectomy, found to have thyroid nodule and was told that it was benign.  Asthma control has been good overall - does note some cough after taking her symbicort at night.  Has had a head cold recently - better now.    6/4/2020 - Here for follow up, overall asthma control has been OK.  She has no known exposure to corona virus and has been practicing social distancing.  Pt asthma is currently stable with no increases in SOB, KAHN or wheezing.  There has been no increase in use of  rescue medications.  Have reviewed medications with pt including the roles of rescue and controlling medications.  All questions answered.  Pt reports no problems with technique with inhalers.  We discussed the possibility of de-escalation of therapy if asthma control remains stable.  Does have some occasional thick mucus.    11/11/2020 - Here for follow up, overall has been doing well, some increasede symptoms with weather " changes.  Pt asthma is currently stable with no increases in SOB, KAHN or wheezing.  There has been no increase in use of  rescue medications.  Have reviewed medications with pt including the roles of rescue and controlling medications.  All questions answered.  Pt reports no problems with technique with inhalers.  We discussed the possibility of de-escalation of therapy if asthma control remains stable.  Patient has no known corona virus exposures and has been practicing social distancing.  We have discussed the virus and precautions and all questions have been answered    5/13/2021 - Here for follow up, Pt asthma is currently stable with no increases in SOB, KAHN or wheezing.  There has been no increase in use of  rescue medications.  Have reviewed medications with pt including the roles of rescue and controlling medications.  All questions answered.  Pt reports no problems with technique with inhalers.  We discussed the possibility of de-escalation of therapy if asthma control remains stable.  Did have some trouble a few weeks ago when the weather was bad but it settled down.  Patient has no known recent corona virus exposures and has been practicing social distancing.  We have discussed the virus and precautions and all questions have been answered.  She had Covid vaccine (Pfizer) and did have some neck/back pain about 4 days later.  In December her  and daughter had Covid but she was tested and was negative.  .  12/15/2021 - Here for follow up, Pt asthma is currently stable with no increases in SOB, KAHN or wheezing.  There has been no increase in use of  rescue medications.  Have reviewed medications with pt including the roles of rescue and controlling medications.  All questions answered.  Pt reports no problems with technique with inhalers.  We discussed the possibility of de-escalation of therapy if asthma control remains stable.  Overall doing well.  Currently living in [place] and planning on  building there.  She has lost > 50 # on her current diet.  Patient has no known corona virus exposures and has been practicing social distancing.  We have discussed the virus and precautions and all questions have been answered.  She has had Covid vaccine.  Has started baking again and has some occasional cough.    7/6/2022 - Here for follow up,  Has lost 80# on her diet (OPTAVIA).  Asthma has been doing well, had one episode in May.  Pt asthma is currently stable with no increases in SOB, KAHN or wheezing.  There has been no increase in use of  rescue medications.  Have reviewed medications with pt including the roles of rescue and controlling medications.  All questions answered.  Pt reports no problems with technique with inhalers.  We discussed the possibility of de-escalation of therapy if asthma control remains stable.     12/22/2022 - Here for follow up,  did have Covid and after that had an episode of bronchitis (treated through Urgent Care), better but still with some cough and mucus and chest tightness.  Going to [place] for [place]'s [place].  No fever, chills, sweats.  Will order prednisone course.    6/22/2023 - Here for follow up, Pt asthma is currently stable with no increases in SOB, KAHN or wheezing.  There has been no increase in use of  rescue medications.  Have reviewed medications with pt including the roles of rescue and controlling medications.  All questions answered.  Pt reports no problems with technique with inhalers.  We discussed the possibility of de-escalation of therapy if asthma control remains stable.  Has had some issues with bladder infections  and this is being addressed.  Has also had some rectal prolapse issues.        Asthma Flowsheet    Asthma -  Moderate  Persistent       Controlled    ACT - 21    Baseline PFT - FEV1- 95 %    Serum eosinophil count - 300  Serum IgE - < 2    Allergy testing - na   Desensitization - no    Therapy: ICS/LABA/LAMA +      PRN albuterol +                  Singulair                  Xolair                  Nucala                  Cinqair       Past Medical History    Past Medical History:   Diagnosis Date    Acid reflux     Allergic sinusitis     Arthritis     knees, hands    Asthma 2017    dr finn    Carpal tunnel syndrome on left     no surg yet    Gall stones 2019    found with lung w/o-- numerous stones-- surg scheduled    Hoarseness     Jaw disease     occ jaw locks    Low iron     Migraines     rare now    Numbness     right hand-     Phobia     of needles-- hard to find iv's    PONV (postoperative nausea and vomiting)     Thyroid disease     trouble swallowing-- being w/o    Vocal cord nodules        Past Surgical History    Past Surgical History:   Procedure Laterality Date    ADENOIDECTOMY      CARPAL TUNNEL RELEASE Right 2014     SECTION      COLONOSCOPY  2013    ELBOW SURGERY Left     and hardware removal    HYSTERECTOMY      JOINT REPLACEMENT  2018    LAPAROSCOPIC CHOLECYSTECTOMY N/A 10/25/2019    Procedure: CHOLECYSTECTOMY, LAPAROSCOPIC;  Surgeon: Douglas Quiroz III, MD;  Location: [facility];  Service: General;  Laterality: N/A;    REPAIR OF RECTOCELE      TONSILLECTOMY      TOTAL KNEE ARTHROPLASTY Left 2018    Procedure: ARTHROPLASTY, KNEE, TOTAL;  Surgeon: Claude S. Williams IV, MD;  Location: [facility];  Service: Orthopedics;  Laterality: Left;       Medications      Current Outpatient Medications:     budesonide-formoterol 160-4.5 mcg (SYMBICORT) 160-4.5 mcg/actuation HFAA, INHALE 2 PUFFS INTO THE LUNGS EVERY 12 (TWELVE) HOURS. CONTROLLER, Disp: 10.2 g, Rfl: 11    estradioL (ESTRACE) 0.01 % (0.1 mg/gram) vaginal cream, Apply 1 fingertipful to vaginal area nightly x 2 weeks then use on MWF, Disp: 42.5 g, Rfl: 3    fluconazole (DIFLUCAN) 150 MG Tab, Take one tablet today then repeat in 3 days, Disp: 2 tablet, Rfl: 0    nitrofurantoin, macrocrystal-monohydrate, (MACROBID) 100 MG capsule, Take 100 mg by mouth 2 (two) times  daily., Disp: , Rfl:     nitrofurantoin, macrocrystal-monohydrate, (MACROBID) 100 MG capsule, Take 1 capsule (100 mg total) by mouth 2 (two) times daily. for 10 days, Disp: 20 capsule, Rfl: 0    pantoprazole (PROTONIX) 40 MG tablet, TAKE 1 TABLET BY MOUTH TWICE A DAY, Disp: 180 tablet, Rfl: 3    phenazopyridine (PYRIDIUM) 200 MG tablet, Take 1 tablet (200 mg total) by mouth 3 (three) times daily as needed for Pain., Disp: 20 tablet, Rfl: 0    predniSONE (DELTASONE) 10 MG tablet, Take 3 a day x 3 days then 2 a day x 3 days then 1 a day x 3 days, Disp: 18 tablet, Rfl: 0    amoxicillin-clavulanate 875-125mg (AUGMENTIN) 875-125 mg per tablet, Take 1 tablet by mouth 2 (two) times daily. (Patient not taking: Reported on 6/22/2023), Disp: 14 tablet, Rfl: 0    Allergies    Review of patient's allergies indicates:   Allergen Reactions    Codeine Nausea And Vomiting    Stadol [butorphanol tartrate] Shortness Of Breath     Pt reports stops breathing    Wiley-dur Shortness Of Breath     Stops breathing    Morphine Nausea And Vomiting     Severe.      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   HENT:  Positive for congestion. Negative for nosebleeds.         + hoarseness   Eyes:  Negative for pain.   Respiratory:  Positive for cough. Negative  for hemoptysis, sputum production, shortness of breath, wheezing and stridor.    Cardiovascular:  Negative for chest pain, palpitations, orthopnea, claudication, leg swelling and PND.   Gastrointestinal:  Positive for heartburn. Negative for abdominal pain, blood in stool, constipation, diarrhea, melena, nausea and vomiting.   Genitourinary:  Negative for dysuria, flank pain, frequency, hematuria and urgency.   Musculoskeletal:  Negative for falls and myalgias.   Skin:  Negative for itching and rash.   Neurological:  Negative for sensory change, focal weakness and weakness.   Psychiatric/Behavioral:  Negative for depression. The patient is not nervous/anxious.      Physical Exam    Vitals:    06/22/23 1108   BP: 120/70   BP Location: Left arm   Patient Position: Sitting   BP Method: Medium (Manual)   Pulse: 71   SpO2: 97%   Weight: 73 kg (160 lb 14.4 oz)       Physical Exam  Vitals and nursing note reviewed.   Constitutional:       General: She is not in acute distress.     Appearance: She is well-developed. She is not diaphoretic.   HENT:      Head: Normocephalic and atraumatic.      Right Ear: External ear normal.      Left Ear: External ear normal.      Nose: Nose normal.   Eyes:      Conjunctiva/sclera: Conjunctivae normal.      Pupils: Pupils are equal, round, and reactive to light.   Neck:      Thyroid: No thyromegaly.      Vascular: No JVD.      Trachea: No tracheal deviation.   Cardiovascular:      Rate and Rhythm: Normal rate and regular rhythm.      Heart sounds: Normal heart sounds. No murmur heard.    No friction rub. No gallop.   Pulmonary:      Effort: Pulmonary effort is normal. No respiratory distress.      Breath sounds: Normal breath sounds. No stridor. No wheezing or rales.   Chest:      Chest wall: No tenderness.   Abdominal:      General: Bowel sounds are normal. There is no distension.      Palpations: Abdomen is soft.      Tenderness: There is no abdominal tenderness.   Musculoskeletal:          General: No tenderness. Normal range of motion.      Cervical back: Normal range of motion and neck supple.   Lymphadenopathy:      Cervical: No cervical adenopathy.   Skin:     General: Skin is warm and dry.   Neurological:      Mental Status: She is alert and oriented to person, place, and time.      Cranial Nerves: No cranial nerve deficit.   Psychiatric:         Behavior: Behavior normal.       Labs    Lab Results   Component Value Date    WBC 5.90 05/19/2021    HGB 13.2 05/19/2021    HCT 41.1 05/19/2021     05/19/2021       Sodium   Date Value Ref Range Status   05/19/2021 141 136 - 145 mmol/L Final     Potassium   Date Value Ref Range Status   05/19/2021 4.5 3.5 - 5.1 mmol/L Final     Chloride   Date Value Ref Range Status   05/19/2021 107 95 - 110 mmol/L Final     CO2   Date Value Ref Range Status   05/19/2021 28 23 - 29 mmol/L Final     Glucose   Date Value Ref Range Status   05/19/2021 104 70 - 110 mg/dL Final     BUN   Date Value Ref Range Status   05/19/2021 12 8 - 23 mg/dL Final     Creatinine   Date Value Ref Range Status   05/19/2021 0.6 0.5 - 1.4 mg/dL Final   07/29/2013 0.7 0.5 - 1.4 mg/dL Final     Calcium   Date Value Ref Range Status   05/19/2021 9.4 8.7 - 10.5 mg/dL Final   07/29/2013 9.1 8.7 - 10.5 mg/dL Final     Total Protein   Date Value Ref Range Status   05/19/2021 7.3 6.0 - 8.4 g/dL Final     Albumin   Date Value Ref Range Status   05/19/2021 4.1 3.5 - 5.2 g/dL Final     Total Bilirubin   Date Value Ref Range Status   05/19/2021 0.8 0.1 - 1.0 mg/dL Final     Comment:     For infants and newborns, interpretation of results should be based  on gestational age, weight and in agreement with clinical  observations.    Premature Infant recommended reference ranges:  Up to 24 hours.............<8.0 mg/dL  Up to 48 hours............<12.0 mg/dL  3-5 days..................<15.0 mg/dL  6-29 days.................<15.0 mg/dL       Alkaline Phosphatase   Date Value Ref Range Status   05/19/2021  67 55 - 135 U/L Final   07/29/2013 79 55 - 135 U/L Final     AST   Date Value Ref Range Status   05/19/2021 19 10 - 40 U/L Final   07/29/2013 17 10 - 40 U/L Final     ALT   Date Value Ref Range Status   05/19/2021 21 10 - 44 U/L Final     Anion Gap   Date Value Ref Range Status   05/19/2021 6 (L) 8 - 16 mmol/L Final   07/29/2013 13 5 - 15 meq/L Final       Xrays        Impression/Plan    Problem List Items Addressed This Visit          ENT    Chronic rhinitis  stable       Pulmonary    Chronic cough  multifactorial  stable       GI    Laryngopharyngeal reflux (LPR)   being treated          Other Visit Diagnoses       Asthma, unspecified asthma severity, unspecified whether complicated, unspecified whether persistent  Continue present medications.  Will refill medications as needed.  Instructed patient to contact us with any issues concerning their medications (cost, reactions, etc.).  Have discussed with patient about inciting conditions which may exacerbate their disease.  We did discuss possible new therapies or de-escalation of therapy (if appropriate).  Discussed the possibility of looking into alternate therapies and/or steroid sparing options  Discussed whether further evaluation of allergies would be warranted  Discussed whether reflux could be playing a role in their symptoms  All questions answered  RTC 6 months  Patient instructed that they are to call if symptoms change or new issues develop prior to their next visit.  Has had mild exacerbation related to infection -   Will order short course steroids and follow      Vitamin D deficiency  being replaced    Laryngitis  better    Allergies  sees Dr Robin Corbin MD

## 2023-06-22 NOTE — PATIENT INSTRUCTIONS
Recommend increased intake of fluids.    If you were prescribed antibiotics take them as directed to completion.    You may take azo OTC as directed for painful urination unless you were prescribe something for these symptoms by the provider.  Follow-up with PCP or return to clinic if no improvement in symptoms over the next 3 days.

## 2023-06-22 NOTE — PROGRESS NOTES
"Subjective:       Patient ID: Valdez Salazar is a 63 y.o. female.    Vitals:  height is 5' 5" (1.651 m) and weight is 69.9 kg (154 lb). Her oral temperature is 98.5 °F (36.9 °C). Her blood pressure is 118/68 and her pulse is 68. Her oxygen saturation is 95%.     Chief Complaint: Urinary Tract Infection (Possible UTI which started yesterday. )    This is a 63 y.o. female who presents today with a chief complaint of  recurrent UTI.  These started more frequently November 2022. No fever.   Patient presents with:  Urinary Tract Infection: Possible UTI which started yesterday.     Patient states that she has been getting these very frequent since November. Patient states that she does have a urologist and tried to get an appointment with them and states that she has not received a callback at this time.     Urinary Tract Infection   The current episode started gradual onset. The problem occurs every urination. The problem has been gradually worsening. The quality of the pain is described as aching. The pain is at a severity of 2/10. The patient is experiencing no pain. There has been no fever. There is No history of pyelonephritis. Associated symptoms include frequency. Treatments tried: pyridium, Her past medical history is significant for recurrent UTIs.     Constitution: Negative.   HENT: Negative.     Neck: neck negative.   Cardiovascular: Negative.    Eyes: Negative.    Respiratory: Negative.     Gastrointestinal: Negative.    Endocrine: negative.   Genitourinary:  Positive for frequency.   Skin: Negative.    Allergic/Immunologic: Negative.    Neurological: Negative.    Hematologic/Lymphatic: Negative.    Psychiatric/Behavioral: Negative.           Objective:      Physical Exam   Constitutional: She is oriented to person, place, and time.   HENT:   Head: Normocephalic and atraumatic.   Nose: Nose normal.   Mouth/Throat: Mucous membranes are moist. Oropharynx is clear.   Eyes: Conjunctivae are normal. Pupils are " equal, round, and reactive to light. Extraocular movement intact   Neck: Neck supple.   Cardiovascular: Normal rate and normal pulses.   Pulmonary/Chest: Effort normal.   Abdominal: Normal appearance. Soft.   Musculoskeletal: Normal range of motion.         General: Normal range of motion.   Neurological: no focal deficit. She is alert, oriented to person, place, and time and at baseline.   Skin: Skin is warm. Capillary refill takes less than 2 seconds.   Psychiatric: Her behavior is normal. Mood, judgment and thought content normal.   Nursing note and vitals reviewed.      Past medical history and current medications reviewed.       Assessment:     Results for orders placed or performed in visit on 06/22/23   POCT Urinalysis, Dipstick, Automated, W/O Scope   Result Value Ref Range    POC Blood, Urine Positive (A) Negative    POC Bilirubin, Urine Positive (A) Negative    POC Urobilinogen, Urine 4.0 MG/dL 0.1 - 1.1    POC Ketones, Urine Negative Negative    POC Protein, Urine Positive (A) Negative    POC Nitrates, Urine Positive (A) Negative    POC Glucose, Urine Positive (A) Negative    pH, UA 6.0     POC Specific Gravity, Urine 1.020 1.003 - 1.029    POC Leukocytes, Urine Positive (A) Negative             1. Frequent urination    2. Urinary tract infection with hematuria, site unspecified              Plan:         Frequent urination  -     POCT Urinalysis, Dipstick, Automated, W/O Scope  -     Urine culture  -     nitrofurantoin, macrocrystal-monohydrate, (MACROBID) 100 MG capsule; Take 1 capsule (100 mg total) by mouth 2 (two) times daily. for 10 days  Dispense: 20 capsule; Refill: 0  -     fluconazole (DIFLUCAN) 150 MG Tab; Take one tablet today then repeat in 3 days  Dispense: 2 tablet; Refill: 0    Urinary tract infection with hematuria, site unspecified  -     nitrofurantoin, macrocrystal-monohydrate, (MACROBID) 100 MG capsule; Take 1 capsule (100 mg total) by mouth 2 (two) times daily. for 10 days  Dispense:  20 capsule; Refill: 0  -     fluconazole (DIFLUCAN) 150 MG Tab; Take one tablet today then repeat in 3 days  Dispense: 2 tablet; Refill: 0  -     phenazopyridine (PYRIDIUM) 200 MG tablet; Take 1 tablet (200 mg total) by mouth 3 (three) times daily as needed for Pain.  Dispense: 20 tablet; Refill: 0             Patient Instructions   Recommend increased intake of fluids.    If you were prescribed antibiotics take them as directed to completion.    You may take azo OTC as directed for painful urination unless you were prescribe something for these symptoms by the provider.  Follow-up with PCP or return to clinic if no improvement in symptoms over the next 3 days.

## 2023-06-24 LAB
BACTERIA UR CULT: NORMAL
BACTERIA UR CULT: NORMAL

## 2023-06-26 ENCOUNTER — TELEPHONE (OUTPATIENT)
Dept: URGENT CARE | Facility: CLINIC | Age: 63
End: 2023-06-26
Payer: MEDICARE

## 2023-06-26 NOTE — TELEPHONE ENCOUNTER
Called patient to follow up in recent visit and to discuss urine culture, no answer message left on voicemail.

## 2023-08-31 ENCOUNTER — OFFICE VISIT (OUTPATIENT)
Dept: URGENT CARE | Facility: CLINIC | Age: 63
End: 2023-08-31
Payer: MEDICARE

## 2023-08-31 VITALS
OXYGEN SATURATION: 96 % | HEIGHT: 65 IN | HEART RATE: 60 BPM | SYSTOLIC BLOOD PRESSURE: 100 MMHG | TEMPERATURE: 98 F | WEIGHT: 156 LBS | BODY MASS INDEX: 25.99 KG/M2 | DIASTOLIC BLOOD PRESSURE: 64 MMHG

## 2023-08-31 DIAGNOSIS — R30.0 DYSURIA: ICD-10-CM

## 2023-08-31 DIAGNOSIS — R39.15 URINARY URGENCY: ICD-10-CM

## 2023-08-31 DIAGNOSIS — N39.0 URINARY TRACT INFECTION WITH HEMATURIA, SITE UNSPECIFIED: Primary | ICD-10-CM

## 2023-08-31 DIAGNOSIS — R31.9 URINARY TRACT INFECTION WITH HEMATURIA, SITE UNSPECIFIED: Primary | ICD-10-CM

## 2023-08-31 LAB
BILIRUB UR QL STRIP: NEGATIVE
GLUCOSE UR QL STRIP: NEGATIVE
KETONES UR QL STRIP: NEGATIVE
LEUKOCYTE ESTERASE UR QL STRIP: NEGATIVE
PH, POC UA: 6
POC BLOOD, URINE: POSITIVE
POC NITRATES, URINE: NEGATIVE
PROT UR QL STRIP: NEGATIVE
SP GR UR STRIP: 1.01 (ref 1–1.03)
UROBILINOGEN UR STRIP-ACNC: NORMAL (ref 0.1–1.1)

## 2023-08-31 PROCEDURE — 81003 URINALYSIS AUTO W/O SCOPE: CPT | Mod: QW,S$GLB,, | Performed by: NURSE PRACTITIONER

## 2023-08-31 PROCEDURE — 87186 SC STD MICRODIL/AGAR DIL: CPT | Performed by: NURSE PRACTITIONER

## 2023-08-31 PROCEDURE — 87088 URINE BACTERIA CULTURE: CPT | Performed by: NURSE PRACTITIONER

## 2023-08-31 PROCEDURE — 87077 CULTURE AEROBIC IDENTIFY: CPT | Performed by: NURSE PRACTITIONER

## 2023-08-31 PROCEDURE — 99213 PR OFFICE/OUTPT VISIT, EST, LEVL III, 20-29 MIN: ICD-10-PCS | Mod: S$GLB,,, | Performed by: NURSE PRACTITIONER

## 2023-08-31 PROCEDURE — 99213 OFFICE O/P EST LOW 20 MIN: CPT | Mod: S$GLB,,, | Performed by: NURSE PRACTITIONER

## 2023-08-31 PROCEDURE — 87086 URINE CULTURE/COLONY COUNT: CPT | Performed by: NURSE PRACTITIONER

## 2023-08-31 PROCEDURE — 81003 POCT URINALYSIS, DIPSTICK, AUTOMATED, W/O SCOPE: ICD-10-PCS | Mod: QW,S$GLB,, | Performed by: NURSE PRACTITIONER

## 2023-08-31 RX ORDER — PHENAZOPYRIDINE HYDROCHLORIDE 200 MG/1
200 TABLET, FILM COATED ORAL 3 TIMES DAILY PRN
Qty: 9 TABLET | Refills: 0 | Status: SHIPPED | OUTPATIENT
Start: 2023-08-31 | End: 2023-09-03

## 2023-08-31 RX ORDER — NITROFURANTOIN 25; 75 MG/1; MG/1
100 CAPSULE ORAL 2 TIMES DAILY
Qty: 14 CAPSULE | Refills: 0 | Status: SHIPPED | OUTPATIENT
Start: 2023-08-31 | End: 2023-09-07

## 2023-08-31 RX ORDER — FLUCONAZOLE 150 MG/1
150 TABLET ORAL
Qty: 2 TABLET | Refills: 0 | Status: SHIPPED | OUTPATIENT
Start: 2023-08-31

## 2023-08-31 NOTE — PATIENT INSTRUCTIONS
Recommend increased intake of fluids.    If you were prescribed antibiotics take them as directed to completion.    You may take azo OTC as directed for painful urination unless you were prescribe something for these symptoms by the provider.  Follow-up with PCP or return to clinic if no improvement in symptoms over the next 3 days.    
[id number]

## 2023-08-31 NOTE — PROGRESS NOTES
"Subjective:       Patient ID: Valdez Salazar is a 63 y.o. female.    Vitals:  height is 5' 5" (1.651 m) and weight is 70.8 kg (156 lb). Her oral temperature is 97.7 °F (36.5 °C). Her blood pressure is 100/64 and her pulse is 60. Her oxygen saturation is 96%.     Chief Complaint: Urinary Urgency (Woke yesterday with urinary urgency, pelvic cramps. States she has a history of this and does see a urologist but they couldn't see her today.  They requested she get a u/a and a culture.)    This is a 63 y.o. female who presents today with a chief complaint of Woke yesterday with urinary urgency, discomfort and pelvic cramps. States she has a history of this and does see a urologist but they couldn't see her today.  They requested she get a u/a and a culture.    Patient presents with:  Urinary Urgency: Woke yesterday with urinary urgency, pelvic cramps. States she has a history of this and does see a urologist but they couldn't see her today.  They requested she get a u/a and a culture.           Constitution: Negative.   Genitourinary:  Positive for dysuria, frequency and urgency.           Objective:      Physical Exam   Constitutional: She is oriented to person, place, and time. She appears well-developed.   HENT:   Head: Normocephalic and atraumatic.   Ears:   Right Ear: External ear normal.   Left Ear: External ear normal.   Nose: Nose normal. No nasal deformity. No epistaxis.   Mouth/Throat: Oropharynx is clear and moist and mucous membranes are normal.   Eyes: Lids are normal.   Neck: Trachea normal and phonation normal. Neck supple.   Cardiovascular: Normal pulses.   Pulmonary/Chest: Effort normal.   Abdominal: Normal appearance and bowel sounds are normal. She exhibits no distension. Soft. There is no abdominal tenderness.   Genitourinary:         Comments: Pt reports dysuria and urgency     Neurological: She is alert and oriented to person, place, and time.   Skin: Skin is warm, dry and intact.   Psychiatric: " Her speech is normal and behavior is normal.   Nursing note and vitals reviewed.        Past medical history and current medications reviewed.     Results for orders placed or performed in visit on 08/31/23   POCT Urinalysis, Dipstick, Automated, W/O Scope   Result Value Ref Range    POC Blood, Urine Positive (A) Negative    POC Bilirubin, Urine Negative Negative    POC Urobilinogen, Urine Normal 0.1 - 1.1    POC Ketones, Urine Negative Negative    POC Protein, Urine Negative Negative    POC Nitrates, Urine Negative Negative    POC Glucose, Urine Negative Negative    pH, UA 6.0     POC Specific Gravity, Urine 1.015 1.003 - 1.029    POC Leukocytes, Urine Negative Negative      Assessment:           1. Urinary tract infection with hematuria, site unspecified    2. Urinary urgency    3. Dysuria              Plan:         Urinary tract infection with hematuria, site unspecified  -     nitrofurantoin, macrocrystal-monohydrate, (MACROBID) 100 MG capsule; Take 1 capsule (100 mg total) by mouth 2 (two) times daily. for 7 days  Dispense: 14 capsule; Refill: 0  -     fluconazole (DIFLUCAN) 150 MG Tab; Take 1 tablet (150 mg total) by mouth Every 3 (three) days.  Dispense: 2 tablet; Refill: 0    Urinary urgency  -     POCT Urinalysis, Dipstick, Automated, W/O Scope  -     Urine culture    Dysuria  -     phenazopyridine (PYRIDIUM) 200 MG tablet; Take 1 tablet (200 mg total) by mouth 3 (three) times daily as needed (painful urination).  Dispense: 9 tablet; Refill: 0             Patient Instructions   Recommend increased intake of fluids.    If you were prescribed antibiotics take them as directed to completion.    You may take azo OTC as directed for painful urination unless you were prescribe something for these symptoms by the provider.  Follow-up with PCP or return to clinic if no improvement in symptoms over the next 3 days.           ROXANA Valdez

## 2023-09-03 LAB — BACTERIA UR CULT: ABNORMAL

## 2023-09-04 ENCOUNTER — TELEPHONE (OUTPATIENT)
Dept: URGENT CARE | Facility: CLINIC | Age: 63
End: 2023-09-04
Payer: MEDICARE

## 2023-09-04 DIAGNOSIS — R31.9 URINARY TRACT INFECTION WITH HEMATURIA, SITE UNSPECIFIED: Primary | ICD-10-CM

## 2023-09-04 DIAGNOSIS — N39.0 URINARY TRACT INFECTION WITH HEMATURIA, SITE UNSPECIFIED: Primary | ICD-10-CM

## 2023-09-04 RX ORDER — NITROFURANTOIN 25; 75 MG/1; MG/1
100 CAPSULE ORAL 2 TIMES DAILY
Qty: 6 CAPSULE | Refills: 0 | Status: SHIPPED | OUTPATIENT
Start: 2023-09-04 | End: 2023-09-07

## 2023-09-04 NOTE — TELEPHONE ENCOUNTER
I spoke to patient on the phone to discuss recent urine culture results.  Patient is currently taking Macrobid which is sensitive to the bacteria present on culture.  Patient reports improvement in symptoms but is still experiencing mild dysuria and intermittent bladder spasms.  I will extend antibiotics for total of 10 day regimen.  Patient will follow up with her urologist as soon as possible for further evaluation and to ensure resolution of symptoms.

## 2023-10-11 ENCOUNTER — PATIENT MESSAGE (OUTPATIENT)
Dept: FAMILY MEDICINE | Facility: CLINIC | Age: 63
End: 2023-10-11

## 2023-12-20 ENCOUNTER — OFFICE VISIT (OUTPATIENT)
Dept: PULMONOLOGY | Facility: CLINIC | Age: 63
End: 2023-12-20
Payer: MEDICARE

## 2023-12-20 VITALS
BODY MASS INDEX: 26.63 KG/M2 | OXYGEN SATURATION: 99 % | DIASTOLIC BLOOD PRESSURE: 70 MMHG | SYSTOLIC BLOOD PRESSURE: 102 MMHG | HEART RATE: 70 BPM | WEIGHT: 160 LBS

## 2023-12-20 DIAGNOSIS — R05.3 CHRONIC COUGH: ICD-10-CM

## 2023-12-20 DIAGNOSIS — E03.9 HYPOTHYROIDISM, UNSPECIFIED TYPE: ICD-10-CM

## 2023-12-20 DIAGNOSIS — E78.5 HYPERLIPIDEMIA, UNSPECIFIED HYPERLIPIDEMIA TYPE: ICD-10-CM

## 2023-12-20 DIAGNOSIS — J45.40 MODERATE PERSISTENT ASTHMA, UNSPECIFIED WHETHER COMPLICATED: Primary | ICD-10-CM

## 2023-12-20 PROCEDURE — 1160F PR REVIEW ALL MEDS BY PRESCRIBER/CLIN PHARMACIST DOCUMENTED: ICD-10-PCS | Mod: CPTII,S$GLB,, | Performed by: INTERNAL MEDICINE

## 2023-12-20 PROCEDURE — 3288F PR FALLS RISK ASSESSMENT DOCUMENTED: ICD-10-PCS | Mod: CPTII,S$GLB,, | Performed by: INTERNAL MEDICINE

## 2023-12-20 PROCEDURE — 1159F MED LIST DOCD IN RCRD: CPT | Mod: CPTII,S$GLB,, | Performed by: INTERNAL MEDICINE

## 2023-12-20 PROCEDURE — 3074F SYST BP LT 130 MM HG: CPT | Mod: CPTII,S$GLB,, | Performed by: INTERNAL MEDICINE

## 2023-12-20 PROCEDURE — 3078F PR MOST RECENT DIASTOLIC BLOOD PRESSURE < 80 MM HG: ICD-10-PCS | Mod: CPTII,S$GLB,, | Performed by: INTERNAL MEDICINE

## 2023-12-20 PROCEDURE — 99214 OFFICE O/P EST MOD 30 MIN: CPT | Mod: S$GLB,,, | Performed by: INTERNAL MEDICINE

## 2023-12-20 PROCEDURE — 3008F BODY MASS INDEX DOCD: CPT | Mod: CPTII,S$GLB,, | Performed by: INTERNAL MEDICINE

## 2023-12-20 PROCEDURE — 99214 PR OFFICE/OUTPT VISIT, EST, LEVL IV, 30-39 MIN: ICD-10-PCS | Mod: S$GLB,,, | Performed by: INTERNAL MEDICINE

## 2023-12-20 PROCEDURE — 1160F RVW MEDS BY RX/DR IN RCRD: CPT | Mod: CPTII,S$GLB,, | Performed by: INTERNAL MEDICINE

## 2023-12-20 PROCEDURE — 3288F FALL RISK ASSESSMENT DOCD: CPT | Mod: CPTII,S$GLB,, | Performed by: INTERNAL MEDICINE

## 2023-12-20 PROCEDURE — 1101F PR PT FALLS ASSESS DOC 0-1 FALLS W/OUT INJ PAST YR: ICD-10-PCS | Mod: CPTII,S$GLB,, | Performed by: INTERNAL MEDICINE

## 2023-12-20 PROCEDURE — 1159F PR MEDICATION LIST DOCUMENTED IN MEDICAL RECORD: ICD-10-PCS | Mod: CPTII,S$GLB,, | Performed by: INTERNAL MEDICINE

## 2023-12-20 PROCEDURE — 1101F PT FALLS ASSESS-DOCD LE1/YR: CPT | Mod: CPTII,S$GLB,, | Performed by: INTERNAL MEDICINE

## 2023-12-20 PROCEDURE — 3078F DIAST BP <80 MM HG: CPT | Mod: CPTII,S$GLB,, | Performed by: INTERNAL MEDICINE

## 2023-12-20 PROCEDURE — 3008F PR BODY MASS INDEX (BMI) DOCUMENTED: ICD-10-PCS | Mod: CPTII,S$GLB,, | Performed by: INTERNAL MEDICINE

## 2023-12-20 PROCEDURE — 3074F PR MOST RECENT SYSTOLIC BLOOD PRESSURE < 130 MM HG: ICD-10-PCS | Mod: CPTII,S$GLB,, | Performed by: INTERNAL MEDICINE

## 2023-12-20 RX ORDER — PANTOPRAZOLE SODIUM 40 MG/1
TABLET, DELAYED RELEASE ORAL
COMMUNITY

## 2023-12-20 RX ORDER — NITROFURANTOIN 25; 75 MG/1; MG/1
100 CAPSULE ORAL 2 TIMES DAILY
COMMUNITY
Start: 2023-09-07

## 2023-12-20 RX ORDER — AZELASTINE 1 MG/ML
SPRAY, METERED NASAL
COMMUNITY

## 2023-12-20 NOTE — PROGRESS NOTES
"  Office Visit    Patient Name: Valdez Salazar  MRN: [id number]  : 1960      Reason for visit: Asthma    HPI:     2018 - Pt with h/o asthma ( has seen Dr Bill in past), has trouble with dyspnea.  Has methacholine challenge test which was "positive".  Was on singulair and BREO, still had difficulties and "thick mucus".  Has trouble when reclining.  Has seen GI to evaluate for reflux ( had esophageal "stretched").  Was referred to Dr Kelly for evaluation and now here to establish care.  [place] also seen Dr Hart for ENT evaluation.  Had medication changes done by dr Kelly and feels better overall.    2018 - HAs been doing fine until last few days then she had some waking episodes.  This AM had some increased chest tightness and has used rescue inhaler more.  Has had some dysphagia and saw Dr Marroquin and was started on diflucan.  Feels better with regards with this.  Has not noted wheezing but does feel tight.    12/3/2018 - Here for follow up to have left knee replacement at [facility] this Friday, breathing is ok, has some cough and mucus.  After last vii she developed laryngitis which has yet to clear (? If related to her Symbicort) - she is to see Dr Peterson tomorrow AM.    Preoperative Pulmonary Clearance    Pt was seen for preoperative clearance from a pulmonary standpoint for planned left polly replacement.  The risks of the procedure have been discussed with the patient including the risk of prolonged ventilatory support/difficulty weaning from ventilator, post-procedure pneumonia, post-procedure respiratory failure and DVT/pulmonary embolism.  The pt is currently stable from their respiratory status and they are cleared for the planned procedure at increased risk.  The risk should not be considered prohibitive.  If you have any questions please contact me.  This clearance is pending evaluation by ENT for persistent hoarseness.    2019 - Here for follow up, doing better overall.  Has seen " "ENT and was found to have a vocal cord nodule, is being treated for reflux.  Still with significant vocal issues.  Mucus has been better.  Feels that asthma control is better and cough is much better than where it was.    5/28/2019 - Here for follow up.  Asthma control has better.  Hoarseness has been better - she has found that having a small cocktail daily had helped with her symptoms.  She tells me that her vocal cord nodule is "better".  Cough is much better. No issues with her medications but does have some concerns that her vocal issues may be related to her inhalers.    7/25/2019 - Was in ER 7/6 with pleurisy (ER note reviewed) - had CTA - negative but had finding of gallstones.   Continues with URI symptoms, post nasal drip.  Has some right arm discomfort (some soreness to right deltoid muscle).  Has some mucus at times which is clear.  Has not noted reflux symptoms (still taking meds).  Asthma control has been OK overall.  Gave her Dr Comer's     12/3/2019 - Here for follow up, since last visit she had cholecystectomy, found to have thyroid nodule and was told that it was benign.  Asthma control has been good overall - does note some cough after taking her symbicort at night.  Has had a head cold recently - better now.    6/4/2020 - Here for follow up, overall asthma control has been OK.  She has no known exposure to corona virus and has been practicing social distancing.  Pt asthma is currently stable with no increases in SOB, KAHN or wheezing.  There has been no increase in use of  rescue medications.  Have reviewed medications with pt including the roles of rescue and controlling medications.  All questions answered.  Pt reports no problems with technique with inhalers.  We discussed the possibility of de-escalation of therapy if asthma control remains stable.  Does have some occasional thick mucus.    11/11/2020 - Here for follow up, overall has been doing well, some increasede symptoms with weather " changes.  Pt asthma is currently stable with no increases in SOB, KAHN or wheezing.  There has been no increase in use of  rescue medications.  Have reviewed medications with pt including the roles of rescue and controlling medications.  All questions answered.  Pt reports no problems with technique with inhalers.  We discussed the possibility of de-escalation of therapy if asthma control remains stable.  Patient has no known corona virus exposures and has been practicing social distancing.  We have discussed the virus and precautions and all questions have been answered    5/13/2021 - Here for follow up, Pt asthma is currently stable with no increases in SOB, KAHN or wheezing.  There has been no increase in use of  rescue medications.  Have reviewed medications with pt including the roles of rescue and controlling medications.  All questions answered.  Pt reports no problems with technique with inhalers.  We discussed the possibility of de-escalation of therapy if asthma control remains stable.  Did have some trouble a few weeks ago when the weather was bad but it settled down.  Patient has no known recent corona virus exposures and has been practicing social distancing.  We have discussed the virus and precautions and all questions have been answered.  She had Covid vaccine (Pfizer) and did have some neck/back pain about 4 days later.  In December her  and daughter had Covid but she was tested and was negative.  .  12/15/2021 - Here for follow up, Pt asthma is currently stable with no increases in SOB, KAHN or wheezing.  There has been no increase in use of  rescue medications.  Have reviewed medications with pt including the roles of rescue and controlling medications.  All questions answered.  Pt reports no problems with technique with inhalers.  We discussed the possibility of de-escalation of therapy if asthma control remains stable.  Overall doing well.  Currently living in [place] and planning on  building there.  She has lost > 50 # on her current diet.  Patient has no known corona virus exposures and has been practicing social distancing.  We have discussed the virus and precautions and all questions have been answered.  She has had Covid vaccine.  Has started baking again and has some occasional cough.    7/6/2022 - Here for follow up,  Has lost 80# on her diet (OPTAVIA).  Asthma has been doing well, had one episode in May.  Pt asthma is currently stable with no increases in SOB, KAHN or wheezing.  There has been no increase in use of  rescue medications.  Have reviewed medications with pt including the roles of rescue and controlling medications.  All questions answered.  Pt reports no problems with technique with inhalers.  We discussed the possibility of de-escalation of therapy if asthma control remains stable.     12/22/2022 - Here for follow up,  did have Covid and after that had an episode of bronchitis (treated through Urgent Care), better but still with some cough and mucus and chest tightness.  Going to [place] for New [place]'s [place]ick[place].  No fever, chills, sweats.  Will order prednisone course.    6/22/2023 - Here for follow up, Pt asthma is currently stable with no increases in SOB, KAHN or wheezing.  There has been no increase in use of  rescue medications.  Have reviewed medications with pt including the roles of rescue and controlling medications.  All questions answered.  Pt reports no problems with technique with inhalers.  We discussed the possibility of de-escalation of therapy if asthma control remains stable.  Has had some issues with bladder infections  and this is being addressed.  Has also had some rectal prolapse issues.    12/20/2023 - Here for follow up, Pt asthma is currently stable with no increases in SOB, KAHN or wheezing.  There has been no increase in use of  rescue medications.  Have reviewed medications with pt including the roles of rescue and controlling medications.  All  questions answered.  Pt reports no problems with technique with inhalers.  We discussed the possibility of de-escalation of therapy if asthma control remains stable  She is working out with a  and is gaining muscle mass.          Asthma Flowsheet    Asthma -  Moderate  Persistent       Controlled    ACT - 21    Baseline PFT - FEV1- 95 %    Serum eosinophil count - 300  Serum IgE - < 2    Allergy testing - na   Desensitization - no    Therapy: ICS/LABA/LAMA +      PRN albuterol +                 Singulair                  Xolair                  Nucala                  Cinqair       Past Medical History    Past Medical History:   Diagnosis Date    Acid reflux     Allergic sinusitis     Arthritis     knees, hands    Asthma 2017    dr finn    Carpal tunnel syndrome on left     no surg yet    Gall stones 2019    found with lung w/o-- numerous stones-- surg scheduled    Hoarseness     Jaw disease     occ jaw locks    Low iron     Migraines     rare now    Numbness     right hand-     Phobia     of needles-- hard to find iv's    PONV (postoperative nausea and vomiting)     Thyroid disease     trouble swallowing-- being w/o    Vocal cord nodules        Past Surgical History    Past Surgical History:   Procedure Laterality Date    ADENOIDECTOMY      CARPAL TUNNEL RELEASE Right      SECTION      COLONOSCOPY  2013    ELBOW SURGERY Left     and hardware removal    HYSTERECTOMY      JOINT REPLACEMENT  2018    LAPAROSCOPIC CHOLECYSTECTOMY N/A 10/25/2019    Procedure: CHOLECYSTECTOMY, LAPAROSCOPIC;  Surgeon: Douglas Quiroz III, MD;  Location[facility] OR;  Service: General;  Laterality: N/A;    REPAIR OF RECTOCELE      TONSILLECTOMY      TOTAL KNEE ARTHROPLASTY Left 2018    Procedure: ARTHROPLASTY, KNEE, TOTAL;  Surgeon: Claude S. Williams IV, MD;  Location: [facility] OR;  Service: Orthopedics;  Laterality: Left;       Medications      Current Outpatient Medications:     azelastine (ASTELIN) 137  mcg (0.1 %) nasal spray, Spray 1 spray twice a day by intranasal route., Disp: , Rfl:     budesonide-formoterol 160-4.5 mcg (SYMBICORT) 160-4.5 mcg/actuation HFAA, INHALE 2 PUFFS INTO THE LUNGS EVERY 12 (TWELVE) HOURS. CONTROLLER, Disp: 10.2 g, Rfl: 11    fluconazole (DIFLUCAN) 150 MG Tab, Take 1 tablet (150 mg total) by mouth Every 3 (three) days., Disp: 2 tablet, Rfl: 0    nitrofurantoin, macrocrystal-monohydrate, (MACROBID) 100 MG capsule, Take 100 mg by mouth 2 (two) times daily., Disp: , Rfl:     pantoprazole (PROTONIX) 40 MG tablet, Take 1 tablet twice a day by oral route., Disp: , Rfl:     phenazopyridine (PYRIDIUM) 200 MG tablet, Take 1 tablet (200 mg total) by mouth 3 (three) times daily as needed for Pain., Disp: 20 tablet, Rfl: 0    estradioL (ESTRACE) 0.01 % (0.1 mg/gram) vaginal cream, Apply 1 fingertipful to vaginal area nightly x 2 weeks then use on MWF (Patient not taking: Reported on 8/31/2023), Disp: 42.5 g, Rfl: 3    tiotropium bromide (SPIRIVA RESPIMAT) 1.25 mcg/actuation inhaler, Inhale 2 puffs every day by inhalation route., Disp: , Rfl:     Allergies    Review of patient's allergies indicates:   Allergen Reactions    Codeine Nausea And Vomiting    Stadol [butorphanol tartrate] Shortness Of Breath     Pt reports stops breathing    Wiley-dur Shortness Of Breath     Stops breathing    Morphine Nausea And Vomiting     Severe.      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   HENT:  Positive for congestion. Negative for nosebleeds.         + hoarseness   Eyes:  Negative for pain.   Respiratory:  Positive for cough. Negative for hemoptysis, sputum production, shortness of breath, wheezing and stridor.    Cardiovascular:  Negative for chest pain, palpitations, orthopnea, claudication, leg swelling and PND.   Gastrointestinal:  Positive for heartburn. Negative for abdominal pain, blood in stool, constipation, diarrhea, melena, nausea and vomiting.   Genitourinary:  Negative for dysuria, flank pain, frequency, hematuria and urgency.   Musculoskeletal:  Negative for falls and myalgias.   Skin:  Negative for itching and rash.   Neurological:  Negative for sensory change, focal weakness and weakness.   Psychiatric/Behavioral:  Negative for depression. The patient is not nervous/anxious.        Physical Exam    Vitals:    12/20/23 1038   BP: 102/70   BP Location: Left arm   Patient Position: Sitting   BP Method: Medium (Manual)   Pulse: 70   SpO2: 99%   Weight: 72.6 kg (160 lb)       Physical Exam  Vitals and nursing note reviewed.   Constitutional:       General: She is not in acute distress.     Appearance: She is well-developed. She is not diaphoretic.   HENT:      Head: Normocephalic and atraumatic.      Right Ear: External ear normal.      Left Ear: External ear normal.      Nose: Nose normal.   Eyes:      Conjunctiva/sclera: Conjunctivae normal.      Pupils: Pupils are equal, round, and reactive to light.   Neck:      Thyroid: No thyromegaly.      Vascular: No JVD.      Trachea: No tracheal deviation.   Cardiovascular:      Rate and Rhythm: Normal rate and regular rhythm.      Heart sounds: Normal heart sounds. No murmur heard.     No friction rub. No gallop.   Pulmonary:      Effort: Pulmonary effort is normal. No  respiratory distress.      Breath sounds: Normal breath sounds. No stridor. No wheezing or rales.   Chest:      Chest wall: No tenderness.   Abdominal:      General: Bowel sounds are normal. There is no distension.      Palpations: Abdomen is soft.      Tenderness: There is no abdominal tenderness.   Musculoskeletal:         General: No tenderness. Normal range of motion.      Cervical back: Normal range of motion and neck supple.   Lymphadenopathy:      Cervical: No cervical adenopathy.   Skin:     General: Skin is warm and dry.   Neurological:      Mental Status: She is alert and oriented to person, place, and time.      Cranial Nerves: No cranial nerve deficit.   Psychiatric:         Behavior: Behavior normal.         Labs    Lab Results   Component Value Date    WBC 5.90 05/19/2021    HGB 13.2 05/19/2021    HCT 41.1 05/19/2021     05/19/2021       Sodium   Date Value Ref Range Status   05/19/2021 141 136 - 145 mmol/L Final     Potassium   Date Value Ref Range Status   05/19/2021 4.5 3.5 - 5.1 mmol/L Final     Chloride   Date Value Ref Range Status   05/19/2021 107 95 - 110 mmol/L Final     CO2   Date Value Ref Range Status   05/19/2021 28 23 - 29 mmol/L Final     Glucose   Date Value Ref Range Status   05/19/2021 104 70 - 110 mg/dL Final     BUN   Date Value Ref Range Status   05/19/2021 12 8 - 23 mg/dL Final     Creatinine   Date Value Ref Range Status   05/19/2021 0.6 0.5 - 1.4 mg/dL Final   07/29/2013 0.7 0.5 - 1.4 mg/dL Final     Calcium   Date Value Ref Range Status   05/19/2021 9.4 8.7 - 10.5 mg/dL Final   07/29/2013 9.1 8.7 - 10.5 mg/dL Final     Total Protein   Date Value Ref Range Status   05/19/2021 7.3 6.0 - 8.4 g/dL Final     Albumin   Date Value Ref Range Status   05/19/2021 4.1 3.5 - 5.2 g/dL Final     Total Bilirubin   Date Value Ref Range Status   05/19/2021 0.8 0.1 - 1.0 mg/dL Final     Comment:     For infants and newborns, interpretation of results should be based  on gestational age,  weight and in agreement with clinical  observations.    Premature Infant recommended reference ranges:  Up to 24 hours.............<8.0 mg/dL  Up to 48 hours............<12.0 mg/dL  3-5 days..................<15.0 mg/dL  6-29 days.................<15.0 mg/dL       Alkaline Phosphatase   Date Value Ref Range Status   05/19/2021 67 55 - 135 U/L Final   07/29/2013 79 55 - 135 U/L Final     AST   Date Value Ref Range Status   05/19/2021 19 10 - 40 U/L Final   07/29/2013 17 10 - 40 U/L Final     ALT   Date Value Ref Range Status   05/19/2021 21 10 - 44 U/L Final     Anion Gap   Date Value Ref Range Status   05/19/2021 6 (L) 8 - 16 mmol/L Final   07/29/2013 13 5 - 15 meq/L Final       Xrays        Impression/Plan    Problem List Items Addressed This Visit          ENT    Chronic rhinitis  stable       Pulmonary    Chronic cough  multifactorial  stable       GI    Laryngopharyngeal reflux (LPR)   being treated          Other Visit Diagnoses       Asthma, unspecified asthma severity, unspecified whether complicated, unspecified whether persistent  Continue present medications.  Will refill medications as needed.  Instructed patient to contact us with any issues concerning their medications (cost, reactions, etc.).  Have discussed with patient about inciting conditions which may exacerbate their disease.  We did discuss possible new therapies or de-escalation of therapy (if appropriate).  Discussed the possibility of looking into alternate therapies and/or steroid sparing options  Discussed whether further evaluation of allergies would be warranted  Discussed whether reflux could be playing a role in their symptoms  All questions answered  RTC 6 months  Patient instructed that they are to call if symptoms change or new issues develop prior to their next visit.  Has had mild exacerbation related to infection -   Will order short course steroids and follow      Vitamin D deficiency  being  replaced    Laryngitis  better    Allergies  sees Dr Robin Corbin MD

## 2024-01-03 ENCOUNTER — TELEPHONE (OUTPATIENT)
Dept: PULMONOLOGY | Facility: CLINIC | Age: 64
End: 2024-01-03

## 2024-01-03 NOTE — TELEPHONE ENCOUNTER
Patient called says she tested positive for covid an wants to know what we can give her to help with covid if anything .

## 2024-01-04 ENCOUNTER — HOSPITAL ENCOUNTER (EMERGENCY)
Facility: HOSPITAL | Age: 64
Discharge: HOME | End: 2024-01-04
Attending: EMERGENCY MEDICINE
Payer: MEDICARE

## 2024-01-04 VITALS
TEMPERATURE: 99 F | OXYGEN SATURATION: 96 % | HEIGHT: 65 IN | DIASTOLIC BLOOD PRESSURE: 76 MMHG | WEIGHT: 160 LBS | RESPIRATION RATE: 19 BRPM | HEART RATE: 75 BPM | SYSTOLIC BLOOD PRESSURE: 100 MMHG | BODY MASS INDEX: 26.66 KG/M2

## 2024-01-04 DIAGNOSIS — R05.1 ACUTE COUGH: Primary | ICD-10-CM

## 2024-01-04 DIAGNOSIS — R05.9 COUGH: ICD-10-CM

## 2024-01-04 PROCEDURE — 99283 EMERGENCY DEPT VISIT LOW MDM: CPT | Mod: 25

## 2024-01-04 RX ORDER — DEXAMETHASONE 6 MG/1
6 TABLET ORAL
Qty: 5 TABLET | Refills: 0 | Status: SHIPPED | OUTPATIENT
Start: 2024-01-04

## 2024-01-04 RX ORDER — NIRMATRELVIR AND RITONAVIR 300-100 MG
KIT ORAL
Qty: 30 TABLET | Refills: 0 | Status: SHIPPED | OUTPATIENT
Start: 2024-01-04 | End: 2024-01-09

## 2024-01-04 NOTE — ED PROVIDER NOTES
Encounter Date: 2024       History     Chief Complaint   Patient presents with    COVID-19 Concerns     Pt has covid, she called dr. Weaver whom request she come in for chest xray.      Patient was diagnosed by her pulmonologist Dr. Arreola with COVID yesterday.  He is sent her here for chest x-ray.  Patient does report cough and fever.      Review of patient's allergies indicates:   Allergen Reactions    Codeine Nausea And Vomiting    Stadol [butorphanol tartrate] Shortness Of Breath     Pt reports stops breathing    Wiley-dur Shortness Of Breath     Stops breathing    Morphine Nausea And Vomiting     Severe. & low oxygen level      Pt states can take demerol    Sulfa (sulfonamide antibiotics) Nausea And Vomiting     Past Medical History:   Diagnosis Date    Acid reflux     Allergic sinusitis     Arthritis     knees, hands    Asthma     dr finn    Carpal tunnel syndrome on left     no surg yet    Gall stones 2019    found with lung w/o-- numerous stones-- surg scheduled    Hoarseness     Jaw disease     occ jaw locks    Low iron 2000    Migraines     rare now    Numbness     right hand-     Phobia     of needles-- hard to find iv's    PONV (postoperative nausea and vomiting)     Thyroid disease     trouble swallowing-- being w/o    Vocal cord nodules      Past Surgical History:   Procedure Laterality Date    ADENOIDECTOMY      CARPAL TUNNEL RELEASE Right 2014     SECTION      COLONOSCOPY  2013    ELBOW SURGERY Left     and hardware removal    HYSTERECTOMY      JOINT REPLACEMENT  2018    LAPAROSCOPIC CHOLECYSTECTOMY N/A 10/25/2019    Procedure: CHOLECYSTECTOMY, LAPAROSCOPIC;  Surgeon: Douglas Quiroz III, MD;  Location: [facility] OR;  Service: General;  Laterality: N/A;    REPAIR OF RECTOCELE      TONSILLECTOMY      TOTAL KNEE ARTHROPLASTY Left 2018    Procedure: ARTHROPLASTY, KNEE, TOTAL;  Surgeon: Claude S. Williams IV, MD;  Location: [facility] OR;  Service: Orthopedics;  Laterality: Left;      Family History   Problem Relation Age of Onset    Allergies Mother     Emphysema Mother     Hypertension Mother     Prostate cancer Father     Alzheimer's disease Father     Kidney disease Father     Heart disease Father     Asthma Sister     Asthma Brother      Social History     Tobacco Use    Smoking status: Never    Smokeless tobacco: Never   Substance Use Topics    Alcohol use: Yes     Comment: Occasionally     Drug use: No     Review of Systems   Constitutional:  Negative for fever.        Body aches   HENT:  Positive for congestion. Negative for sore throat and trouble swallowing.    Respiratory:  Positive for cough. Negative for shortness of breath and wheezing.    Cardiovascular:  Negative for chest pain, palpitations and leg swelling.   Gastrointestinal:  Negative for abdominal pain, diarrhea, nausea and vomiting.   Musculoskeletal:  Negative for back pain.   Skin:  Negative for rash.   Neurological:  Negative for dizziness, weakness and headaches.       Physical Exam     Initial Vitals [01/04/24 1212]   BP Pulse Resp Temp SpO2   100/76 75 19 98.8 °F (37.1 °C) 96 %      MAP       --         Physical Exam    Constitutional: She appears well-developed and well-nourished.   HENT:   Head: Normocephalic.   Mouth/Throat: Oropharynx is clear and moist.   Eyes: Conjunctivae are normal.   Neck: Neck supple.   Normal range of motion.  Cardiovascular:  Normal rate, regular rhythm and normal heart sounds.           Pulmonary/Chest: Breath sounds normal. No respiratory distress. She has no wheezes. She has no rhonchi.   Musculoskeletal:         General: Normal range of motion.      Cervical back: Normal range of motion and neck supple.      Comments: Patient is ambulatory per self.  Her gait is steady.     Neurological: She is alert and oriented to person, place, and time. No sensory deficit. GCS score is 15. GCS eye subscore is 4. GCS verbal subscore is 5. GCS motor subscore is 6.   Skin: Skin is warm and dry.  Capillary refill takes less than 2 seconds.   Psychiatric: She has a normal mood and affect. Thought content normal.         ED Course   Procedures  Labs Reviewed - No data to display       Imaging Results              X-Ray Chest PA And Lateral (Final result)  Result time 01/04/24 12:39:21      Final result by Wilton Reeves MD (01/04/24 12:39:21)                   Narrative:    CLINICAL HISTORY:  63 years (1960) Female COVID-19 Concerns (Pt has covid, she called dr. Weaver whom request she come in for chest xray. )    TECHNIQUE:  PA and lateral radiograph of the chest. Two views.    COMPARISON:  Radiograph from October 21, 2019.    FINDINGS:  The lungs are clear. Costophrenic angles are seen without effusion. No pneumothorax is identified. The heart is normal in size. The mediastinum is within normal limits. Osseous structures appear within normal limits. The visualized upper abdomen is unremarkable.    IMPRESSION:  No acute cardiac or pulmonary process.                  .            Electronically signed by:  Wilton Reeves MD  01/04/2024 12:39 PM [facility] Workstation: [id number]                                     Medications - No data to display  Medical Decision Making  Was diagnosed yesterday with COVID per .  She was sent here for chest x-ray.    Amount and/or Complexity of Data Reviewed  Radiology: ordered.     Details: Chest x-rays negative    Risk  Risk Details: Patient has been instructed to alternate Tylenol and Motrin every 3 hours as needed for fever.  She has been instructed to follow up with her pulmonologist.  Her chest x-ray was negative.  She asked about Paxlovid.  I have instructed her to talk with Dr. Weaver regarding taking this medication.  At no time while in the ED did she appear to be in any acute distress.  She was given return precautions.                                      Clinical Impression:  Final diagnoses:  [R05.9] Cough  [R05.1] Acute cough (Primary)           ED Disposition Condition    Discharge Stable          ED Prescriptions    None       Follow-up Information       Follow up With Specialties Details Why Contact Info    Thee Corbin MD Pulmonary Disease In 1 day  [address]  [id number]  [facility] [place] [address]  [phone]               Cris Vasquez, NP  01/04/24 5440

## 2024-01-04 NOTE — DISCHARGE INSTRUCTIONS
Make a follow-up appointment regarding your COVID positive testing with Dr. Weaver.  Keep yourself well hydrated.  Alternate Tylenol and Motrin every 3 hours as needed for fever.  Return to the ED for any worsening of symptoms or any other concerns.  
HTN (hypertension)

## 2024-01-30 DIAGNOSIS — J45.40 MODERATE PERSISTENT ASTHMA, UNSPECIFIED WHETHER COMPLICATED: ICD-10-CM

## 2024-01-30 RX ORDER — BUDESONIDE AND FORMOTEROL FUMARATE DIHYDRATE 160; 4.5 UG/1; UG/1
2 AEROSOL RESPIRATORY (INHALATION) 2 TIMES DAILY
Qty: 10.2 G | Refills: 11 | Status: SHIPPED | OUTPATIENT
Start: 2024-01-30

## 2024-02-10 ENCOUNTER — OFFICE VISIT (OUTPATIENT)
Dept: URGENT CARE | Facility: CLINIC | Age: 64
End: 2024-02-10
Payer: MEDICARE

## 2024-02-10 VITALS
BODY MASS INDEX: 26.33 KG/M2 | WEIGHT: 158 LBS | RESPIRATION RATE: 18 BRPM | OXYGEN SATURATION: 96 % | HEIGHT: 65 IN | HEART RATE: 101 BPM | TEMPERATURE: 100 F | DIASTOLIC BLOOD PRESSURE: 55 MMHG | SYSTOLIC BLOOD PRESSURE: 97 MMHG

## 2024-02-10 DIAGNOSIS — J10.1 INFLUENZA A: Primary | ICD-10-CM

## 2024-02-10 DIAGNOSIS — R50.9 FEVER, UNSPECIFIED FEVER CAUSE: ICD-10-CM

## 2024-02-10 LAB
CTP QC/QA: YES
POC MOLECULAR INFLUENZA A AGN: POSITIVE
POC MOLECULAR INFLUENZA B AGN: NEGATIVE

## 2024-02-10 PROCEDURE — 99214 OFFICE O/P EST MOD 30 MIN: CPT | Mod: S$GLB,,,

## 2024-02-10 PROCEDURE — 87502 INFLUENZA DNA AMP PROBE: CPT | Mod: QW,,,

## 2024-02-10 RX ORDER — FLUTICASONE PROPIONATE 50 MCG
1 SPRAY, SUSPENSION (ML) NASAL DAILY
Qty: 9.9 ML | Refills: 0 | Status: SHIPPED | OUTPATIENT
Start: 2024-02-10

## 2024-02-10 RX ORDER — PROMETHAZINE HYDROCHLORIDE AND DEXTROMETHORPHAN HYDROBROMIDE 6.25; 15 MG/5ML; MG/5ML
5 SYRUP ORAL EVERY 8 HOURS PRN
Qty: 120 ML | Refills: 0 | Status: SHIPPED | OUTPATIENT
Start: 2024-02-10

## 2024-02-10 RX ORDER — OSELTAMIVIR PHOSPHATE 75 MG/1
75 CAPSULE ORAL 2 TIMES DAILY
Qty: 10 CAPSULE | Refills: 0 | Status: SHIPPED | OUTPATIENT
Start: 2024-02-10 | End: 2024-02-15

## 2024-02-10 NOTE — PROGRESS NOTES
"dSubjective:       Patient ID: Valdez Salazar is a 63 y.o. female.    Vitals:  height is 5' 5" (1.651 m) and weight is 71.7 kg (158 lb). Her oral temperature is 100.2 °F (37.9 °C). Her blood pressure is 97/55 (abnormal) and her pulse is 101. Her respiration is 18 and oxygen saturation is 96%.     Chief Complaint: Cough (Patient reports fever, headache, cough, and fatigue x 1 day. )    This is a 63 y.o. female who presents today with a chief complaint of  Patient presents with:  Cough: Patient reports fever, headache, cough, and fatigue x 1 day. Patient had Covid 1 month ago.        Cough  This is a new problem. The current episode started yesterday. The problem has been gradually worsening. The cough is Productive of sputum. Associated symptoms include chills, a fever, headaches, myalgias, nasal congestion, postnasal drip and a sore throat. Nothing aggravates the symptoms. She has tried OTC cough suppressant for the symptoms. The treatment provided no relief. Her past medical history is significant for asthma and bronchitis.       Constitution: Positive for chills and fever.   HENT:  Positive for postnasal drip and sore throat.    Neck: neck negative.   Cardiovascular: Negative.    Eyes: Negative.    Respiratory:  Positive for cough.    Gastrointestinal: Negative.    Endocrine: negative.   Genitourinary: Negative.    Musculoskeletal:  Positive for muscle ache.   Allergic/Immunologic: Negative.    Neurological:  Positive for headaches.   Hematologic/Lymphatic: Negative.    Psychiatric/Behavioral: Negative.             Objective:      Physical Exam   Constitutional: She is oriented to person, place, and time.   HENT:   Head: Normocephalic and atraumatic.   Ears:   Right Ear: Tympanic membrane, external ear and ear canal normal.   Left Ear: Tympanic membrane, external ear and ear canal normal.   Nose: Rhinorrhea and congestion present.   Mouth/Throat: Mucous membranes are moist. Posterior oropharyngeal erythema " present. Oropharynx is clear.   Eyes: Conjunctivae are normal. Pupils are equal, round, and reactive to light. Extraocular movement intact   Neck: Neck supple.   Cardiovascular: Normal rate, regular rhythm, normal heart sounds and normal pulses.   Pulmonary/Chest: Effort normal and breath sounds normal.   Abdominal: Normal appearance.   Musculoskeletal: Normal range of motion.         General: Normal range of motion.   Neurological: no focal deficit. She is alert, oriented to person, place, and time and at baseline.   Skin: Skin is warm.   Psychiatric: Her behavior is normal. Mood, judgment and thought content normal.   Nursing note and vitals reviewed.        Past medical history and current medications reviewed.       Assessment:           1. Influenza A    2. Fever, unspecified fever cause        Results for orders placed or performed in visit on 02/10/24   POCT Influenza A/B MOLECULAR   Result Value Ref Range    POC Molecular Influenza A Ag Positive (A) Negative, Not Reported    POC Molecular Influenza B Ag Negative Negative, Not Reported     Acceptable Yes           Plan:         Influenza A  -     oseltamivir (TAMIFLU) 75 MG capsule; Take 1 capsule (75 mg total) by mouth 2 (two) times daily. for 5 days  Dispense: 10 capsule; Refill: 0  -     promethazine-dextromethorphan (PROMETHAZINE-DM) 6.25-15 mg/5 mL Syrp; Take 5 mLs by mouth every 8 (eight) hours as needed (cough).  Dispense: 120 mL; Refill: 0  -     fluticasone propionate (FLONASE) 50 mcg/actuation nasal spray; 1 spray (50 mcg total) by Each Nostril route once daily.  Dispense: 9.9 mL; Refill: 0    Fever, unspecified fever cause  -     POCT Influenza A/B MOLECULAR  -     oseltamivir (TAMIFLU) 75 MG capsule; Take 1 capsule (75 mg total) by mouth 2 (two) times daily. for 5 days  Dispense: 10 capsule; Refill: 0  -     promethazine-dextromethorphan (PROMETHAZINE-DM) 6.25-15 mg/5 mL Syrp; Take 5 mLs by mouth every 8 (eight) hours as needed  (cough).  Dispense: 120 mL; Refill: 0  -     fluticasone propionate (FLONASE) 50 mcg/actuation nasal spray; 1 spray (50 mcg total) by Each Nostril route once daily.  Dispense: 9.9 mL; Refill: 0             Patient Instructions   Please return here or go to the Emergency Department for any concerns or worsening of condition.  Please drink plenty of fluids.  Please get plenty of rest.  If you were prescribed antibiotics, please take them to completion.  If you were given wait & see antibiotics, please wait 5-7 days before taking them, and only take them if your symptoms have worsened or not improved.  If you do begin taking the antibiotics, please take them to completion.  If you were given a steroid shot in the clinic and have also been given a prescription for a steroid such as Prednisone or a Medrol Dose Pack, please begin taking them tomorrow.  If you do not have Hypertension or any history of palpitations, it is ok to take over the counter Sudafed or Mucinex D or Allegra-D or Claritin-D or Zyrtec-D.  If you do take one of the above, it is ok to combine that with plain over the counter Mucinex or Allegra or Claritin or Zyrtec.  If for example you are taking Zyrtec -D, you can combine that with Mucinex, but not Mucinex-D.  If you are taking Mucinex-D, you can combine that with plain Allegra or Claritin or Zyrtec.   If you do have Hypertension or palpitations, it is safe to take Coricidin HBP for relief of sinus symptoms.  If not allergic, please take over the counter Tylenol (Acetaminophen) and/or Motrin (Ibuprofen) as directed for control of pain and/or fever.  Please follow up with your primary care doctor or specialist as needed.    If you  smoke, please stop smoking.

## 2024-02-26 ENCOUNTER — TELEPHONE (OUTPATIENT)
Dept: PULMONOLOGY | Facility: HOSPITAL | Age: 64
End: 2024-02-26

## 2024-02-26 RX ORDER — PREDNISONE 10 MG/1
TABLET ORAL
Qty: 18 TABLET | Refills: 0 | Status: SHIPPED | OUTPATIENT
Start: 2024-02-26

## 2024-02-27 ENCOUNTER — LAB VISIT (OUTPATIENT)
Dept: LAB | Facility: HOSPITAL | Age: 64
End: 2024-02-27
Attending: INTERNAL MEDICINE
Payer: MEDICARE

## 2024-02-27 ENCOUNTER — TELEPHONE (OUTPATIENT)
Dept: PULMONOLOGY | Facility: CLINIC | Age: 64
End: 2024-02-27

## 2024-02-27 DIAGNOSIS — E03.9 HYPOTHYROIDISM, UNSPECIFIED TYPE: ICD-10-CM

## 2024-02-27 DIAGNOSIS — J45.40 MODERATE PERSISTENT ASTHMA, UNSPECIFIED WHETHER COMPLICATED: ICD-10-CM

## 2024-02-27 DIAGNOSIS — E78.5 HYPERLIPIDEMIA, UNSPECIFIED HYPERLIPIDEMIA TYPE: ICD-10-CM

## 2024-02-27 DIAGNOSIS — E78.5 HYPERLIPIDEMIA, UNSPECIFIED HYPERLIPIDEMIA TYPE: Primary | ICD-10-CM

## 2024-02-27 LAB
ALBUMIN SERPL BCP-MCNC: 4.2 G/DL (ref 3.5–5.2)
ALP SERPL-CCNC: 57 U/L (ref 55–135)
ALT SERPL W/O P-5'-P-CCNC: 16 U/L (ref 10–44)
ANION GAP SERPL CALC-SCNC: 5 MMOL/L (ref 8–16)
AST SERPL-CCNC: 14 U/L (ref 10–40)
BASOPHILS # BLD AUTO: 0.08 K/UL (ref 0–0.2)
BASOPHILS NFR BLD: 1.4 % (ref 0–1.9)
BILIRUB SERPL-MCNC: 0.6 MG/DL (ref 0.1–1)
BUN SERPL-MCNC: 19 MG/DL (ref 8–23)
CALCIUM SERPL-MCNC: 9.6 MG/DL (ref 8.7–10.5)
CHLORIDE SERPL-SCNC: 109 MMOL/L (ref 95–110)
CHOLEST SERPL-MCNC: 270 MG/DL (ref 120–199)
CHOLEST/HDLC SERPL: 3.2 {RATIO} (ref 2–5)
CO2 SERPL-SCNC: 29 MMOL/L (ref 23–29)
CREAT SERPL-MCNC: 0.6 MG/DL (ref 0.5–1.4)
DIFFERENTIAL METHOD BLD: ABNORMAL
EOSINOPHIL # BLD AUTO: 0.3 K/UL (ref 0–0.5)
EOSINOPHIL NFR BLD: 5 % (ref 0–8)
ERYTHROCYTE [DISTWIDTH] IN BLOOD BY AUTOMATED COUNT: 13.7 % (ref 11.5–14.5)
EST. GFR  (NO RACE VARIABLE): >60 ML/MIN/1.73 M^2
GLUCOSE SERPL-MCNC: 101 MG/DL (ref 70–110)
HCT VFR BLD AUTO: 40.1 % (ref 37–48.5)
HDLC SERPL-MCNC: 84 MG/DL (ref 40–75)
HDLC SERPL: 31.1 % (ref 20–50)
HGB BLD-MCNC: 12.6 G/DL (ref 12–16)
IMM GRANULOCYTES # BLD AUTO: 0.01 K/UL (ref 0–0.04)
IMM GRANULOCYTES NFR BLD AUTO: 0.2 % (ref 0–0.5)
LDLC SERPL CALC-MCNC: 175 MG/DL (ref 63–159)
LYMPHOCYTES # BLD AUTO: 2.2 K/UL (ref 1–4.8)
LYMPHOCYTES NFR BLD: 39.6 % (ref 18–48)
MCH RBC QN AUTO: 29.3 PG (ref 27–31)
MCHC RBC AUTO-ENTMCNC: 31.4 G/DL (ref 32–36)
MCV RBC AUTO: 93 FL (ref 82–98)
MONOCYTES # BLD AUTO: 0.7 K/UL (ref 0.3–1)
MONOCYTES NFR BLD: 11.7 % (ref 4–15)
NEUTROPHILS # BLD AUTO: 2.3 K/UL (ref 1.8–7.7)
NEUTROPHILS NFR BLD: 42.1 % (ref 38–73)
NONHDLC SERPL-MCNC: 186 MG/DL
NRBC BLD-RTO: 0 /100 WBC
PLATELET # BLD AUTO: 183 K/UL (ref 150–450)
PMV BLD AUTO: 10.4 FL (ref 9.2–12.9)
POTASSIUM SERPL-SCNC: 3.9 MMOL/L (ref 3.5–5.1)
PROT SERPL-MCNC: 6.6 G/DL (ref 6–8.4)
RBC # BLD AUTO: 4.3 M/UL (ref 4–5.4)
SODIUM SERPL-SCNC: 143 MMOL/L (ref 136–145)
TRIGL SERPL-MCNC: 55 MG/DL (ref 30–150)
TSH SERPL DL<=0.005 MIU/L-ACNC: 1.12 UIU/ML (ref 0.34–5.6)
WBC # BLD AUTO: 5.55 K/UL (ref 3.9–12.7)

## 2024-02-27 PROCEDURE — 36415 COLL VENOUS BLD VENIPUNCTURE: CPT | Performed by: INTERNAL MEDICINE

## 2024-02-27 PROCEDURE — 80061 LIPID PANEL: CPT | Performed by: INTERNAL MEDICINE

## 2024-02-27 PROCEDURE — 80053 COMPREHEN METABOLIC PANEL: CPT | Performed by: INTERNAL MEDICINE

## 2024-02-27 PROCEDURE — 85025 COMPLETE CBC W/AUTO DIFF WBC: CPT | Performed by: INTERNAL MEDICINE

## 2024-02-27 PROCEDURE — 84443 ASSAY THYROID STIM HORMONE: CPT | Performed by: INTERNAL MEDICINE

## 2024-02-27 NOTE — TELEPHONE ENCOUNTER
Patient is aware of blood work results . Patient said she does not have a pcp or a cardiologist . She wants to know if she can get a referral for pcp dodie matt .

## 2024-02-27 NOTE — TELEPHONE ENCOUNTER
----- Message from Thee Corbin MD sent at 2/27/2024  3:39 PM CST -----  CMP, CBC, TSH OK  Cholesterol is too high does she have a primary or cardiologist for us to forward it to

## 2024-06-19 ENCOUNTER — OFFICE VISIT (OUTPATIENT)
Dept: URGENT CARE | Facility: CLINIC | Age: 64
End: 2024-06-19
Payer: MEDICARE

## 2024-06-19 VITALS
OXYGEN SATURATION: 97 % | RESPIRATION RATE: 18 BRPM | HEIGHT: 66 IN | DIASTOLIC BLOOD PRESSURE: 66 MMHG | TEMPERATURE: 98 F | WEIGHT: 168 LBS | HEART RATE: 65 BPM | SYSTOLIC BLOOD PRESSURE: 99 MMHG | BODY MASS INDEX: 27 KG/M2

## 2024-06-19 DIAGNOSIS — L03.113 CELLULITIS OF RIGHT ARM: Primary | ICD-10-CM

## 2024-06-19 DIAGNOSIS — S61.432A PUNCTURE WOUND OF LEFT HAND WITHOUT FOREIGN BODY, INITIAL ENCOUNTER: ICD-10-CM
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
## 2024-06-19 DIAGNOSIS — S41.131A PUNCTURE WOUND OF RIGHT UPPER ARM, INITIAL ENCOUNTER: ICD-10-CM

## 2024-06-19 PROCEDURE — 90714 TD VACC NO PRESV 7 YRS+ IM: CPT | Mod: S$GLB,,, | Performed by: NURSE PRACTITIONER

## 2024-06-19 PROCEDURE — 90471 IMMUNIZATION ADMIN: CPT | Mod: S$GLB,,, | Performed by: NURSE PRACTITIONER

## 2024-06-19 PROCEDURE — 99213 OFFICE O/P EST LOW 20 MIN: CPT | Mod: 25,S$GLB,, | Performed by: NURSE PRACTITIONER

## 2024-06-19 RX ORDER — FLUCONAZOLE 150 MG/1
150 TABLET ORAL
Qty: 2 TABLET | Refills: 0 | Status: SHIPPED | OUTPATIENT
Start: 2024-06-19

## 2024-06-19 RX ORDER — AMOXICILLIN AND CLAVULANATE POTASSIUM 875; 125 MG/1; MG/1
1 TABLET, FILM COATED ORAL 2 TIMES DAILY
Qty: 20 TABLET | Refills: 0 | Status: SHIPPED | OUTPATIENT
Start: 2024-06-19 | End: 2024-06-29

## 2024-06-19 NOTE — PROGRESS NOTES
"Subjective:       Patient ID: Valdez Salazar is a 64 y.o. female.    Vitals:  height is 5' 5.5" (1.664 m) and weight is 76.2 kg (167 lb 15.9 oz). Her oral temperature is 98 °F (36.7 °C). Her blood pressure is 99/66 and her pulse is 65. Her respiration is 18 and oxygen saturation is 97%.     Chief Complaint: Animal Bite    This is a 64 y.o. female who presents today with a chief complaint of cat bite to her right arm lateral upper arm and left hand along with cat scratches to left side of face two days ago.  C/o redness and tenderness to right arm. Cleaned it with alcohol and put neosporin on it. Pt reports that this was her cat and cat is up to date on necessary vaccines.    Patient presents with:  Animal Bite         Animal Bite   Episode onset: 2 days ago. The incident occurred at home. There is an injury to the Right upper arm. She is Right-handed. Her tetanus status is unknown.       Constitution: Negative.   Skin:  Positive for erythema.   Neurological:  Negative for disorientation and altered mental status.   Psychiatric/Behavioral:  Negative for altered mental status, disorientation and confusion.            Objective:      Physical Exam   Constitutional: She is oriented to person, place, and time. She appears well-developed. She is cooperative.   HENT:   Head: Normocephalic and atraumatic.   Ears:   Right Ear: Hearing, tympanic membrane, external ear and ear canal normal.   Left Ear: Hearing, tympanic membrane, external ear and ear canal normal.   Nose: Nose normal. No mucosal edema or nasal deformity. No epistaxis. Right sinus exhibits no maxillary sinus tenderness and no frontal sinus tenderness. Left sinus exhibits no maxillary sinus tenderness and no frontal sinus tenderness.   Mouth/Throat: Uvula is midline, oropharynx is clear and moist and mucous membranes are normal. No trismus in the jaw. Normal dentition. No uvula swelling.   Multiple superficial linear scratches noted to left orbital area and " left cheek. No swelling, no erythema, no pain to this area.       Comments: Multiple superficial linear scratches noted to left orbital area and left cheek. No swelling, no erythema, no pain to this area.   Eyes: Conjunctivae and lids are normal.   Neck: Trachea normal and phonation normal. Neck supple.   Cardiovascular: Normal rate, regular rhythm, normal heart sounds and normal pulses.   Pulmonary/Chest: Effort normal and breath sounds normal.   Abdominal: Normal appearance and bowel sounds are normal. Soft.   Musculoskeletal: Normal range of motion.         General: Normal range of motion.      Right elbow: She exhibits swelling (mild swelling with erythema noted to right lateral elbow area, 10cm in diameter. closed puncture wound noted with no discharge.).      Left wrist: She exhibits swelling (two puncture wounds noted to dorsal aspect of hand, closed, no discharge, no surrounding erythema and only mild pain).   Neurological: She is alert and oriented to person, place, and time. She exhibits normal muscle tone.   Skin: Skin is warm, dry and intact. erythema   Psychiatric: Her speech is normal and behavior is normal. Judgment and thought content normal.   Nursing note and vitals reviewed.        Past medical history and current medications reviewed.       Assessment:           1. Cellulitis of right arm    2. Puncture wound of left hand without foreign body, initial encounter    3. Puncture wound of right upper arm, initial encounter    4. Cat bite, initial encounter              Plan:         Cellulitis of right arm  -     amoxicillin-clavulanate 875-125mg (AUGMENTIN) 875-125 mg per tablet; Take 1 tablet by mouth 2 (two) times a day. for 10 days  Dispense: 20 tablet; Refill: 0  -     fluconazole (DIFLUCAN) 150 MG Tab; Take 1 tablet (150 mg total) by mouth Every 3 (three) days.  Dispense: 2 tablet; Refill: 0    Puncture wound of left hand without foreign body, initial encounter  -     (In Office Administered)  Td Vaccine - Preservative Free    Puncture wound of right upper arm, initial encounter  -     (In Office Administered) Td Vaccine - Preservative Free    Cat bite, initial encounter  -     (In Office Administered) Td Vaccine - Preservative Free             Patient Instructions   Report to ER for any worsening of symptoms or acute concerns.   Keep areas clean and dry.  Avoid scratching areas and perform good hand hygiene.    May alternate Tylenol and Ibuprofen for pain.   Follow-up with PCP if no improvement in symptoms or for any worsening of symptoms.             Oscar Wynn, FRANCISP-C

## 2024-06-19 NOTE — PATIENT INSTRUCTIONS
Report to ER for any worsening of symptoms or acute concerns.   Keep areas clean and dry.  Avoid scratching areas and perform good hand hygiene.    May alternate Tylenol and Ibuprofen for pain.   Follow-up with PCP if no improvement in symptoms or for any worsening of symptoms.

## 2024-06-20 ENCOUNTER — OFFICE VISIT (OUTPATIENT)
Dept: PULMONOLOGY | Facility: CLINIC | Age: 64
End: 2024-06-20
Payer: MEDICARE

## 2024-06-20 VITALS
HEART RATE: 72 BPM | BODY MASS INDEX: 27.37 KG/M2 | WEIGHT: 167 LBS | DIASTOLIC BLOOD PRESSURE: 70 MMHG | OXYGEN SATURATION: 98 % | SYSTOLIC BLOOD PRESSURE: 120 MMHG

## 2024-06-20 DIAGNOSIS — J45.40 MODERATE PERSISTENT ASTHMA, UNSPECIFIED WHETHER COMPLICATED: ICD-10-CM

## 2024-06-20 RX ORDER — ALBUTEROL SULFATE 90 UG/1
2 AEROSOL, METERED RESPIRATORY (INHALATION) EVERY 6 HOURS PRN
Qty: 18 G | Refills: 11 | Status: SHIPPED | OUTPATIENT
Start: 2024-06-20

## 2024-06-20 RX ORDER — VALACYCLOVIR HYDROCHLORIDE 1 G/1
1000 TABLET, FILM COATED ORAL DAILY
Qty: 90 TABLET | Refills: 3 | Status: SHIPPED | OUTPATIENT
Start: 2024-06-20

## 2024-06-20 RX ORDER — BUDESONIDE AND FORMOTEROL FUMARATE DIHYDRATE 160; 4.5 UG/1; UG/1
2 AEROSOL RESPIRATORY (INHALATION) 2 TIMES DAILY
Qty: 10.2 G | Refills: 11 | Status: SHIPPED | OUTPATIENT
Start: 2024-06-20

## 2024-06-20 NOTE — PROGRESS NOTES
"  Office Visit    Patient Name: Valdez Salazar  MRN: [id number]  : 1960      Reason for visit: Asthma    HPI:     2018 - Pt with h/o asthma ( has seen Dr Bill in past), has trouble with dyspnea.  Has methacholine challenge test which was "positive".  Was on singulair and BREO, still had difficulties and "thick mucus".  Has trouble when reclining.  Has seen GI to evaluate for reflux ( had esophageal "stretched").  Was referred to Dr Kelly for evaluation and now here to establish care.  Hutchins also seen Dr Hart for ENT evaluation.  Had medication changes done by dr Kelly and feels better overall.    2018 - HAs been doing fine until last few days then she had some waking episodes.  This AM had some increased chest tightness and has used rescue inhaler more.  Has had some dysphagia and saw Dr Marroquin and was started on diflucan.  Feels better with regards with this.  Has not noted wheezing but does feel tight.    12/3/2018 - Here for follow up to have left knee replacement at [facility] this Friday, breathing is ok, has some cough and mucus.  After last vii she developed laryngitis which has yet to clear (? If related to her Symbicort) - she is to see Dr Peterson tomorrow AM.    Preoperative Pulmonary Clearance    Pt was seen for preoperative clearance from a pulmonary standpoint for planned left polly replacement.  The risks of the procedure have been discussed with the patient including the risk of prolonged ventilatory support/difficulty weaning from ventilator, post-procedure pneumonia, post-procedure respiratory failure and DVT/pulmonary embolism.  The pt is currently stable from their respiratory status and they are cleared for the planned procedure at increased risk.  The risk should not be considered prohibitive.  If you have any questions please contact me.  This clearance is pending evaluation by ENT for persistent hoarseness.    2019 - Here for follow up, doing better overall.  Has seen " "ENT and was found to have a vocal cord nodule, is being treated for reflux.  Still with significant vocal issues.  Mucus has been better.  Feels that asthma control is better and cough is much better than where it was.    5/28/2019 - Here for follow up.  Asthma control has better.  Hoarseness has been better - she has found that having a small cocktail daily had helped with her symptoms.  She tells me that her vocal cord nodule is "better".  Cough is much better. No issues with her medications but does have some concerns that her vocal issues may be related to her inhalers.    7/25/2019 - Was in ER 7/6 with pleurisy (ER note reviewed) - had CTA - negative but had finding of gallstones.   Continues with URI symptoms, post nasal drip.  Has some right arm discomfort (some soreness to right deltoid muscle).  Has some mucus at times which is clear.  Has not noted reflux symptoms (still taking meds).  Asthma control has been OK overall.  Gave her Dr Comer's     12/3/2019 - Here for follow up, since last visit she had cholecystectomy, found to have thyroid nodule and was told that it was benign.  Asthma control has been good overall - does note some cough after taking her symbicort at night.  Has had a head cold recently - better now.    6/4/2020 - Here for follow up, overall asthma control has been OK.  She has no known exposure to corona virus and has been practicing social distancing.  Pt asthma is currently stable with no increases in SOB, KAHN or wheezing.  There has been no increase in use of  rescue medications.  Have reviewed medications with pt including the roles of rescue and controlling medications.  All questions answered.  Pt reports no problems with technique with inhalers.  We discussed the possibility of de-escalation of therapy if asthma control remains stable.  Does have some occasional thick mucus.    11/11/2020 - Here for follow up, overall has been doing well, some increasede symptoms with weather " changes.  Pt asthma is currently stable with no increases in SOB, KAHN or wheezing.  There has been no increase in use of  rescue medications.  Have reviewed medications with pt including the roles of rescue and controlling medications.  All questions answered.  Pt reports no problems with technique with inhalers.  We discussed the possibility of de-escalation of therapy if asthma control remains stable.  Patient has no known corona virus exposures and has been practicing social distancing.  We have discussed the virus and precautions and all questions have been answered    5/13/2021 - Here for follow up, Pt asthma is currently stable with no increases in SOB, KAHN or wheezing.  There has been no increase in use of  rescue medications.  Have reviewed medications with pt including the roles of rescue and controlling medications.  All questions answered.  Pt reports no problems with technique with inhalers.  We discussed the possibility of de-escalation of therapy if asthma control remains stable.  Did have some trouble a few weeks ago when the weather was bad but it settled down.  Patient has no known recent corona virus exposures and has been practicing social distancing.  We have discussed the virus and precautions and all questions have been answered.  She had Covid vaccine (Pfizer) and did have some neck/back pain about 4 days later.  In December her  and daughter had Covid but she was tested and was negative.  .  12/15/2021 - Here for follow up, Pt asthma is currently stable with no increases in SOB, KAHN or wheezing.  There has been no increase in use of  rescue medications.  Have reviewed medications with pt including the roles of rescue and controlling medications.  All questions answered.  Pt reports no problems with technique with inhalers.  We discussed the possibility of de-escalation of therapy if asthma control remains stable.  Overall doing well.  Currently living in [place] and planning on  building there.  She has lost > 50 # on her current diet.  Patient has no known corona virus exposures and has been practicing social distancing.  We have discussed the virus and precautions and all questions have been answered.  She has had Covid vaccine.  Has started baking again and has some occasional cough.    7/6/2022 - Here for follow up,  Has lost 80# on her diet (OPTAVIA).  Asthma has been doing well, had one episode in May.  Pt asthma is currently stable with no increases in SOB, KAHN or wheezing.  There has been no increase in use of  rescue medications.  Have reviewed medications with pt including the roles of rescue and controlling medications.  All questions answered.  Pt reports no problems with technique with inhalers.  We discussed the possibility of de-escalation of therapy if asthma control remains stable.     12/22/2022 - Here for follow up,  did have Covid and after that had an episode of bronchitis (treated through Urgent Care), better but still with some cough and mucus and chest tightness.  Going to [place] for [place].  No fever, chills, sweats.  Will order prednisone course.    6/22/2023 - Here for follow up, Pt asthma is currently stable with no increases in SOB, KAHN or wheezing.  There has been no increase in use of  rescue medications.  Have reviewed medications with pt including the roles of rescue and controlling medications.  All questions answered.  Pt reports no problems with technique with inhalers.  We discussed the possibility of de-escalation of therapy if asthma control remains stable.  Has had some issues with bladder infections  and this is being addressed.  Has also had some rectal prolapse issues.    12/20/2023 - Here for follow up, Pt asthma is currently stable with no increases in SOB, KAHN or wheezing.  There has been no increase in use of  rescue medications.  Have reviewed medications with pt including the roles of rescue and controlling medications.  All  questions answered.  Pt reports no problems with technique with inhalers.  We discussed the possibility of de-escalation of therapy if asthma control remains stable  She is working out with a  and is gaining muscle mass.      2023 - Here for follow up, Pt asthma is currently stable with no increases in SOB, KAHN or wheezing.  There has been no increase in use of  rescue medications.  Have reviewed medications with pt including the roles of rescue and controlling medications.  All questions answered.  Pt reports no problems with technique with inhalers.  We discussed the possibility of de-escalation of therapy if asthma control remains stable.  She had a run in with her cat and had some wounds and she is on antibiotics.          Asthma Flowsheet    Asthma -  Moderate  Persistent       Controlled    ACT - 21    Baseline PFT - FEV1- 95 %    Serum eosinophil count - 300  Serum IgE - < 2    Allergy testing - na   Desensitization - no    Therapy: ICS/LABA/LAMA +      PRN albuterol +                 Singulair                  Xolair                  Nucala                  Cinqair       Past Medical History    Past Medical History:   Diagnosis Date    Acid reflux     Allergic sinusitis     Arthritis     knees, hands    Asthma 2017    dr finn    Carpal tunnel syndrome on left     no surg yet    Gall stones 2019    found with lung w/o-- numerous stones-- surg scheduled    Hoarseness     Jaw disease     occ jaw locks    Low iron     Migraines     rare now    Numbness     right hand-     Phobia     of needles-- hard to find iv's    PONV (postoperative nausea and vomiting)     Thyroid disease     trouble swallowing-- being w/o    Vocal cord nodules        Past Surgical History    Past Surgical History:   Procedure Laterality Date    ADENOIDECTOMY      CARPAL TUNNEL RELEASE Right 2014     SECTION      COLONOSCOPY  2013    ELBOW SURGERY Left     and hardware removal    HYSTERECTOMY      JOINT  REPLACEMENT  12/2018    LAPAROSCOPIC CHOLECYSTECTOMY N/A 10/25/2019    Procedure: CHOLECYSTECTOMY, LAPAROSCOPIC;  Surgeon: Douglas Quiroz III, MD;  Location: [facility] OR;  Service: General;  Laterality: N/A;    REPAIR OF RECTOCELE      TONSILLECTOMY      TOTAL KNEE ARTHROPLASTY Left 12/7/2018    Procedure: ARTHROPLASTY, KNEE, TOTAL;  Surgeon: Claude S. Williams IV, MD;  Location: [facility] OR;  Service: Orthopedics;  Laterality: Left;       Medications      Current Outpatient Medications:     amoxicillin-clavulanate 875-125mg (AUGMENTIN) 875-125 mg per tablet, Take 1 tablet by mouth 2 (two) times a day. for 10 days, Disp: 20 tablet, Rfl: 0    SYMBICORT 160-4.5 mcg/actuation HFAA, INHALE 2 PUFFS TWICE A DAY, Disp: 10.2 g, Rfl: 11    azelastine (ASTELIN) 137 mcg (0.1 %) nasal spray, Spray 1 spray twice a day by intranasal route. (Patient not taking: Reported on 6/19/2024), Disp: , Rfl:     dexAMETHasone (DECADRON) 6 MG tablet, Take 1 tablet (6 mg total) by mouth daily with breakfast. (Patient not taking: Reported on 6/19/2024), Disp: 5 tablet, Rfl: 0    estradioL (ESTRACE) 0.01 % (0.1 mg/gram) vaginal cream, Apply 1 fingertipful to vaginal area nightly x 2 weeks then use on MWF (Patient not taking: Reported on 8/31/2023), Disp: 42.5 g, Rfl: 3    fluconazole (DIFLUCAN) 150 MG Tab, Take 1 tablet (150 mg total) by mouth Every 3 (three) days. (Patient not taking: Reported on 6/19/2024), Disp: 2 tablet, Rfl: 0    fluconazole (DIFLUCAN) 150 MG Tab, Take 1 tablet (150 mg total) by mouth Every 3 (three) days. (Patient not taking: Reported on 6/20/2024), Disp: 2 tablet, Rfl: 0    fluticasone propionate (FLONASE) 50 mcg/actuation nasal spray, 1 spray (50 mcg total) by Each Nostril route once daily. (Patient not taking: Reported on 6/19/2024), Disp: 9.9 mL, Rfl: 0    nitrofurantoin, macrocrystal-monohydrate, (MACROBID) 100 MG capsule, Take 100 mg by mouth 2 (two) times daily. (Patient not taking: Reported on 6/19/2024), Disp: , Rfl:      pantoprazole (PROTONIX) 40 MG tablet, Take 1 tablet twice a day by oral route. (Patient not taking: Reported on 6/19/2024), Disp: , Rfl:     phenazopyridine (PYRIDIUM) 200 MG tablet, Take 1 tablet (200 mg total) by mouth 3 (three) times daily as needed for Pain. (Patient not taking: Reported on 6/19/2024), Disp: 20 tablet, Rfl: 0    predniSONE (DELTASONE) 10 MG tablet, Take 3 a day x 3 days then 2 a day x 3 days then 1 a day x 3 days (Patient not taking: Reported on 6/19/2024), Disp: 18 tablet, Rfl: 0    promethazine-dextromethorphan (PROMETHAZINE-DM) 6.25-15 mg/5 mL Syrp, Take 5 mLs by mouth every 8 (eight) hours as needed (cough). (Patient not taking: Reported on 6/19/2024), Disp: 120 mL, Rfl: 0    tiotropium bromide (SPIRIVA RESPIMAT) 1.25 mcg/actuation inhaler, Inhale 2 puffs every day by inhalation route. (Patient not taking: Reported on 6/19/2024), Disp: , Rfl:     Allergies    Review of patient's allergies indicates:   Allergen Reactions    Codeine Nausea And Vomiting    Stadol [butorphanol tartrate] Shortness Of Breath     Pt reports stops breathing    Wiley-dur Shortness Of Breath     Stops breathing    Morphine Nausea And Vomiting     Severe.      Pt states can take demerol       SocHx    Social History     Tobacco Use   Smoking Status Never    Passive exposure: Past   Smokeless Tobacco Never       Social History     Substance and Sexual Activity   Alcohol Use Yes    Comment: Occasionally        Drug Use - no  Occupation - [employer] (6 years) - home based  Asbestos exposure - no  Pets - 2 cats    FMHx    Family History   Problem Relation Name Age of Onset    Allergies Mother      Emphysema Mother      Hypertension Mother      Prostate cancer Father      Alzheimer's disease Father      Kidney disease Father      Heart disease Father      Asthma Sister      Asthma Brother           Review of Systems  Review of Systems   Constitutional:  Negative for chills, diaphoresis, fever, malaise/fatigue and  weight loss.   HENT:  Positive for congestion. Negative for nosebleeds.         + hoarseness   Eyes:  Negative for pain.   Respiratory:  Positive for cough. Negative for hemoptysis, sputum production, shortness of breath, wheezing and stridor.    Cardiovascular:  Negative for chest pain, palpitations, orthopnea, claudication, leg swelling and PND.   Gastrointestinal:  Positive for heartburn. Negative for abdominal pain, blood in stool, constipation, diarrhea, melena, nausea and vomiting.   Genitourinary:  Negative for dysuria, flank pain, frequency, hematuria and urgency.   Musculoskeletal:  Negative for falls and myalgias.   Skin:  Negative for itching and rash.   Neurological:  Negative for sensory change, focal weakness and weakness.   Psychiatric/Behavioral:  Negative for depression. The patient is not nervous/anxious.        Physical Exam    Vitals:    06/20/24 1127   BP: 120/70   BP Location: Left arm   Patient Position: Sitting   BP Method: Medium (Manual)   Pulse: 72   SpO2: 98%   Weight: 75.8 kg (167 lb)       Physical Exam  Vitals and nursing note reviewed.   Constitutional:       General: She is not in acute distress.     Appearance: She is well-developed. She is not diaphoretic.   HENT:      Head: Normocephalic and atraumatic.      Right Ear: External ear normal.      Left Ear: External ear normal.      Nose: Nose normal.   Eyes:      Conjunctiva/sclera: Conjunctivae normal.      Pupils: Pupils are equal, round, and reactive to light.   Neck:      Thyroid: No thyromegaly.      Vascular: No JVD.      Trachea: No tracheal deviation.   Cardiovascular:      Rate and Rhythm: Normal rate and regular rhythm.      Heart sounds: Normal heart sounds. No murmur heard.     No friction rub. No gallop.   Pulmonary:      Effort: Pulmonary effort is normal. No respiratory distress.      Breath sounds: Normal breath sounds. No stridor. No wheezing or rales.   Chest:      Chest wall: No tenderness.   Abdominal:       General: Bowel sounds are normal. There is no distension.      Palpations: Abdomen is soft.      Tenderness: There is no abdominal tenderness.   Musculoskeletal:         General: No tenderness. Normal range of motion.      Cervical back: Normal range of motion and neck supple.   Lymphadenopathy:      Cervical: No cervical adenopathy.   Skin:     General: Skin is warm and dry.   Neurological:      Mental Status: She is alert and oriented to person, place, and time.      Cranial Nerves: No cranial nerve deficit.   Psychiatric:         Behavior: Behavior normal.         Labs    Lab Results   Component Value Date    WBC 5.55 02/27/2024    HGB 12.6 02/27/2024    HCT 40.1 02/27/2024     02/27/2024       Sodium   Date Value Ref Range Status   02/27/2024 143 136 - 145 mmol/L Final     Potassium   Date Value Ref Range Status   02/27/2024 3.9 3.5 - 5.1 mmol/L Final     Chloride   Date Value Ref Range Status   02/27/2024 109 95 - 110 mmol/L Final     CO2   Date Value Ref Range Status   02/27/2024 29 23 - 29 mmol/L Final     Glucose   Date Value Ref Range Status   02/27/2024 101 70 - 110 mg/dL Final     BUN   Date Value Ref Range Status   02/27/2024 19 8 - 23 mg/dL Final     Creatinine   Date Value Ref Range Status   02/27/2024 0.6 0.5 - 1.4 mg/dL Final   07/29/2013 0.7 0.5 - 1.4 mg/dL Final     Calcium   Date Value Ref Range Status   02/27/2024 9.6 8.7 - 10.5 mg/dL Final   07/29/2013 9.1 8.7 - 10.5 mg/dL Final     Total Protein   Date Value Ref Range Status   02/27/2024 6.6 6.0 - 8.4 g/dL Final     Albumin   Date Value Ref Range Status   02/27/2024 4.2 3.5 - 5.2 g/dL Final     Total Bilirubin   Date Value Ref Range Status   02/27/2024 0.6 0.1 - 1.0 mg/dL Final     Comment:     For infants and newborns, interpretation of results should be based  on gestational age, weight and in agreement with clinical  observations.    Premature Infant recommended reference ranges:  Up to 24 hours.............<8.0 mg/dL  Up to 48  hours............<12.0 mg/dL  3-5 days..................<15.0 mg/dL  6-29 days.................<15.0 mg/dL       Alkaline Phosphatase   Date Value Ref Range Status   02/27/2024 57 55 - 135 U/L Final   07/29/2013 79 55 - 135 U/L Final     AST   Date Value Ref Range Status   02/27/2024 14 10 - 40 U/L Final   07/29/2013 17 10 - 40 U/L Final     ALT   Date Value Ref Range Status   02/27/2024 16 10 - 44 U/L Final     Anion Gap   Date Value Ref Range Status   02/27/2024 5 (L) 8 - 16 mmol/L Final   07/29/2013 13 5 - 15 meq/L Final       Xrays        Impression/Plan    Problem List Items Addressed This Visit          ENT    Chronic rhinitis  stable       Pulmonary    Chronic cough  multifactorial  stable       GI    Laryngopharyngeal reflux (LPR)   being treated          Other Visit Diagnoses       Asthma, unspecified asthma severity, unspecified whether complicated, unspecified whether persistent  Continue present medications.  Will refill medications as needed.  Instructed patient to contact us with any issues concerning their medications (cost, reactions, etc.).  Have discussed with patient about inciting conditions which may exacerbate their disease.  We did discuss possible new therapies or de-escalation of therapy (if appropriate).  Discussed the possibility of looking into alternate therapies and/or steroid sparing options  Discussed whether further evaluation of allergies would be warranted  Discussed whether reflux could be playing a role in their symptoms  All questions answered  RTC 6 months  Patient instructed that they are to call if symptoms change or new issues develop prior to their next visit.  Has had mild exacerbation related to infection -   Will order short course steroids and follow      Vitamin D deficiency  being replaced    Laryngitis  better    Allergies  sees Dr Robin Corbin MD

## 2024-07-02 ENCOUNTER — LAB VISIT (OUTPATIENT)
Dept: LAB | Facility: HOSPITAL | Age: 64
End: 2024-07-02
Attending: FAMILY MEDICINE
Payer: MEDICARE

## 2024-07-02 ENCOUNTER — OFFICE VISIT (OUTPATIENT)
Dept: FAMILY MEDICINE | Facility: CLINIC | Age: 64
End: 2024-07-02
Payer: MEDICARE

## 2024-07-02 VITALS
DIASTOLIC BLOOD PRESSURE: 62 MMHG | HEIGHT: 66 IN | WEIGHT: 170 LBS | HEART RATE: 79 BPM | OXYGEN SATURATION: 96 % | SYSTOLIC BLOOD PRESSURE: 113 MMHG | BODY MASS INDEX: 27.32 KG/M2

## 2024-07-02 DIAGNOSIS — Z78.0 MENOPAUSE: ICD-10-CM

## 2024-07-02 DIAGNOSIS — E78.5 HYPERLIPIDEMIA, UNSPECIFIED HYPERLIPIDEMIA TYPE: ICD-10-CM

## 2024-07-02 DIAGNOSIS — Z12.31 ENCOUNTER FOR SCREENING MAMMOGRAM FOR MALIGNANT NEOPLASM OF BREAST: Primary | ICD-10-CM

## 2024-07-02 DIAGNOSIS — Z12.11 COLON CANCER SCREENING: ICD-10-CM

## 2024-07-02 DIAGNOSIS — R30.0 DYSURIA: ICD-10-CM

## 2024-07-02 DIAGNOSIS — Z13.820 OSTEOPOROSIS SCREENING: ICD-10-CM

## 2024-07-02 LAB
BACTERIA #/AREA URNS AUTO: ABNORMAL /HPF
BILIRUB UR QL STRIP: NEGATIVE
CLARITY UR REFRACT.AUTO: ABNORMAL
COLOR UR AUTO: YELLOW
GLUCOSE UR QL STRIP: NEGATIVE
HGB UR QL STRIP: ABNORMAL
KETONES UR QL STRIP: NEGATIVE
LEUKOCYTE ESTERASE UR QL STRIP: ABNORMAL
MICROSCOPIC COMMENT: ABNORMAL
NITRITE UR QL STRIP: POSITIVE
PH UR STRIP: 6 [PH] (ref 5–8)
PROT UR QL STRIP: ABNORMAL
RBC #/AREA URNS AUTO: 5 /HPF (ref 0–4)
SP GR UR STRIP: 1.03 (ref 1–1.03)
SQUAMOUS #/AREA URNS AUTO: 2 /HPF
URN SPEC COLLECT METH UR: ABNORMAL
WBC #/AREA URNS AUTO: >100 /HPF (ref 0–5)

## 2024-07-02 PROCEDURE — 3008F BODY MASS INDEX DOCD: CPT | Mod: CPTII,S$GLB,, | Performed by: FAMILY MEDICINE

## 2024-07-02 PROCEDURE — 1126F AMNT PAIN NOTED NONE PRSNT: CPT | Mod: CPTII,S$GLB,, | Performed by: FAMILY MEDICINE

## 2024-07-02 PROCEDURE — 3288F FALL RISK ASSESSMENT DOCD: CPT | Mod: CPTII,S$GLB,, | Performed by: FAMILY MEDICINE

## 2024-07-02 PROCEDURE — 87186 SC STD MICRODIL/AGAR DIL: CPT | Performed by: FAMILY MEDICINE

## 2024-07-02 PROCEDURE — 87077 CULTURE AEROBIC IDENTIFY: CPT | Performed by: FAMILY MEDICINE

## 2024-07-02 PROCEDURE — 81001 URINALYSIS AUTO W/SCOPE: CPT | Performed by: FAMILY MEDICINE

## 2024-07-02 PROCEDURE — 1159F MED LIST DOCD IN RCRD: CPT | Mod: CPTII,S$GLB,, | Performed by: FAMILY MEDICINE

## 2024-07-02 PROCEDURE — 1101F PT FALLS ASSESS-DOCD LE1/YR: CPT | Mod: CPTII,S$GLB,, | Performed by: FAMILY MEDICINE

## 2024-07-02 PROCEDURE — 99203 OFFICE O/P NEW LOW 30 MIN: CPT | Mod: S$GLB,,, | Performed by: FAMILY MEDICINE

## 2024-07-02 PROCEDURE — 3074F SYST BP LT 130 MM HG: CPT | Mod: CPTII,S$GLB,, | Performed by: FAMILY MEDICINE

## 2024-07-02 PROCEDURE — 3078F DIAST BP <80 MM HG: CPT | Mod: CPTII,S$GLB,, | Performed by: FAMILY MEDICINE

## 2024-07-02 PROCEDURE — 87086 URINE CULTURE/COLONY COUNT: CPT | Performed by: FAMILY MEDICINE

## 2024-07-02 PROCEDURE — 99999 PR PBB SHADOW E&M-EST. PATIENT-LVL IV: CPT | Mod: PBBFAC,,, | Performed by: FAMILY MEDICINE

## 2024-07-02 PROCEDURE — 87088 URINE BACTERIA CULTURE: CPT | Performed by: FAMILY MEDICINE

## 2024-07-02 NOTE — PROGRESS NOTES
SUBJECTIVE:    Patient ID: Valdez Salazar is a 64 y.o. female.    Chief Complaint: Establish Care  65 yo female new to this patient here today to establish care and to followup on hyperlipidemia.  We reviewed her lipids using the ASCVD risk calculator patient's risk of MI in the next 10 years is less than 5% we discussed whether she should start statins are not.  Patient has decided not to use statins.      I reviewed her overdue health maintenance patient has not had a primary care doctor in a while she is due for hepatitis-C screening pneumococcal vaccine diabetes screening DEXA scan colorectal cancer screening shingles vaccine RSV vaccine COVID-19 vaccine and mammogram.  Patient has declined all vaccines today    Significant Past medical history:  Asthma: Symbicort,   Vitamin D deficiency:       Specialists:   Pulmonary: Dr Finn  Optom: Dr Arabella Wolfe  Ophth: Dr Sanderson  GYN: Dr Chavarria  Allergy: Dr Kelly  Gastro: Dr Marroquin    Smoke: None  ETOH: Minimal  Exercise: Walk 1.5 miles, weight training 3 days a week.        Hyperlipidemia  This is a new problem. This is a new diagnosis. Recent lipid tests were reviewed and are normal. She has no history of diabetes, hypothyroidism or obesity. There are no known factors aggravating her hyperlipidemia. Pertinent negatives include no chest pain, focal sensory loss, focal weakness, leg pain, myalgias or shortness of breath. She is currently on no antihyperlipidemic treatment.         Past Medical History:   Diagnosis Date    Acid reflux     Allergic sinusitis     Arthritis     knees, hands    Asthma 2017    dr finn    Carpal tunnel syndrome on left     no surg yet    Gall stones 09/2019    found with lung w/o-- numerous stones-- surg scheduled    Hoarseness     Jaw disease     occ jaw locks    Low iron 2000    Migraines     rare now    Numbness     right hand-     Phobia     of needles-- hard to find iv's    PONV (postoperative nausea and vomiting)      Thyroid disease     trouble swallowing-- being w/o    Vocal cord nodules      Social History     Socioeconomic History    Marital status:    Tobacco Use    Smoking status: Never     Passive exposure: Past    Smokeless tobacco: Never   Substance and Sexual Activity    Alcohol use: Yes     Comment: Occasionally     Drug use: No     Past Surgical History:   Procedure Laterality Date    ADENOIDECTOMY      CARPAL TUNNEL RELEASE Right 2014     SECTION      COLONOSCOPY  2013    ELBOW SURGERY Left     and hardware removal    HYSTERECTOMY      JOINT REPLACEMENT  2018    LAPAROSCOPIC CHOLECYSTECTOMY N/A 10/25/2019    Procedure: CHOLECYSTECTOMY, LAPAROSCOPIC;  Surgeon: Douglas Quiroz III, MD;  Location: [facility] OR;  Service: General;  Laterality: N/A;    REPAIR OF RECTOCELE      TONSILLECTOMY      TOTAL KNEE ARTHROPLASTY Left 2018    Procedure: ARTHROPLASTY, KNEE, TOTAL;  Surgeon: Claude S. Williams IV, MD;  Location: [facility] OR;  Service: Orthopedics;  Laterality: Left;     Family History   Problem Relation Name Age of Onset    Allergies Mother      Emphysema Mother      Hypertension Mother      Prostate cancer Father      Alzheimer's disease Father      Kidney disease Father      Heart disease Father      Asthma Sister      Asthma Brother       Current Outpatient Medications   Medication Sig Dispense Refill    albuterol (VENTOLIN HFA) 90 mcg/actuation inhaler Inhale 2 puffs into the lungs every 6 (six) hours as needed for Wheezing. Rescue 18 g 11    SYMBICORT 160-4.5 mcg/actuation HFAA Inhale 2 puffs into the lungs 2 (two) times daily. 10.2 g 11    valACYclovir (VALTREX) 1000 MG tablet Take 1 tablet (1,000 mg total) by mouth once daily. 90 tablet 3    estrogens, conjugated, (PREMARIN) 0.625 MG tablet Take 1 tablet (0.625 mg total) by mouth once daily. 30 tablet 11     No current facility-administered medications for this visit.     Review of patient's allergies indicates:   Allergen Reactions    Codeine  "Nausea And Vomiting    Stadol [butorphanol tartrate] Shortness Of Breath     Pt reports stops breathing    Wiley-dur Shortness Of Breath     Stops breathing    Morphine Nausea And Vomiting     Severe. & low oxygen level      Pt states can take demerol    Sulfa (sulfonamide antibiotics) Nausea And Vomiting       Review of Systems   Constitutional:  Negative for activity change, appetite change, diaphoresis, fatigue and unexpected weight change.   HENT:  Negative for congestion, postnasal drip, rhinorrhea, sinus pressure and sinus pain.    Respiratory:  Negative for cough, chest tightness, shortness of breath and stridor.    Cardiovascular:  Negative for chest pain, palpitations and leg swelling.   Gastrointestinal:  Negative for abdominal distention, abdominal pain, anal bleeding, blood in stool, constipation and diarrhea.   Genitourinary:  Negative for dysuria, flank pain, frequency, hematuria and urgency.        Vaginal dryness and hot flashes, patient has had a total hysterectomy   Musculoskeletal:  Negative for myalgias.   Neurological:  Negative for focal weakness.          Blood pressure 113/62, pulse 79, height 5' 5.5" (1.664 m), weight 77.1 kg (170 lb), SpO2 96%. Body mass index is 27.86 kg/m².   Objective:      Physical Exam  Vitals reviewed.   Constitutional:       General: She is not in acute distress.     Appearance: Normal appearance. She is not ill-appearing or toxic-appearing.   HENT:      Head: Normocephalic and atraumatic.      Right Ear: Tympanic membrane normal. There is no impacted cerumen.      Left Ear: Tympanic membrane normal. There is no impacted cerumen.      Nose: Nose normal. No congestion or rhinorrhea.      Mouth/Throat:      Mouth: Mucous membranes are moist.      Pharynx: No oropharyngeal exudate or posterior oropharyngeal erythema.   Eyes:      General: No scleral icterus.     Pupils: Pupils are equal, round, and reactive to light.   Cardiovascular:      Rate and Rhythm: Normal rate " and regular rhythm.      Heart sounds: Normal heart sounds. No murmur heard.  Pulmonary:      Effort: Pulmonary effort is normal. No respiratory distress.      Breath sounds: Normal breath sounds. No wheezing, rhonchi or rales.   Musculoskeletal:      Cervical back: Normal range of motion.   Skin:     General: Skin is warm and dry.      Capillary Refill: Capillary refill takes less than 2 seconds.   Neurological:      Mental Status: She is alert and oriented to person, place, and time.             Assessment:       1. Encounter for screening mammogram for malignant neoplasm of breast    2. Hyperlipidemia, unspecified hyperlipidemia type    3. Osteoporosis screening    4. Menopause    5. Colon cancer screening    6. Dysuria         Plan:           Encounter for screening mammogram for malignant neoplasm of breast  -     Mammo Digital Screening Bilat w/ Mykel; Future; Expected date: 07/02/2024    Hyperlipidemia, unspecified hyperlipidemia type  -     Ambulatory referral/consult to Family Practice    Osteoporosis screening  -     DXA Bone Density Axial Skeleton 1 or more sites; Future; Expected date: 07/02/2024    Menopause  -     DXA Bone Density Axial Skeleton 1 or more sites; Future; Expected date: 07/02/2024  -     estrogens, conjugated, (PREMARIN) 0.625 MG tablet; Take 1 tablet (0.625 mg total) by mouth once daily.  Dispense: 30 tablet; Refill: 11    Colon cancer screening  -     Ambulatory referral/consult to Gastroenterology; Future; Expected date: 07/09/2024    Dysuria  -     Urinalysis; Future; Expected date: 07/02/2024  -     Urine culture; Future; Expected date: 07/02/2024

## 2024-07-03 ENCOUNTER — TELEPHONE (OUTPATIENT)
Dept: FAMILY MEDICINE | Facility: CLINIC | Age: 64
End: 2024-07-03
Payer: MEDICARE

## 2024-07-03 RX ORDER — FLUCONAZOLE 150 MG/1
150 TABLET ORAL DAILY
Qty: 1 TABLET | Refills: 0 | Status: SHIPPED | OUTPATIENT
Start: 2024-07-03 | End: 2024-07-04

## 2024-07-03 RX ORDER — PHENAZOPYRIDINE HYDROCHLORIDE 200 MG/1
200 TABLET, FILM COATED ORAL 3 TIMES DAILY PRN
Qty: 6 TABLET | Refills: 0 | Status: SHIPPED | OUTPATIENT
Start: 2024-07-03 | End: 2024-07-13

## 2024-07-03 RX ORDER — NITROFURANTOIN 25; 75 MG/1; MG/1
100 CAPSULE ORAL 2 TIMES DAILY
Qty: 10 CAPSULE | Refills: 0 | Status: SHIPPED | OUTPATIENT
Start: 2024-07-03

## 2024-07-03 NOTE — TELEPHONE ENCOUNTER
Patient called stating she saw her urine results. She is wanting to know if you could send in diflucan and pyridium with Macrobid.Please send to [facility].  Please advise

## 2024-07-03 NOTE — TELEPHONE ENCOUNTER
Called patient to advise medication was sent to her pharmacy. Verbal understanding was expressed.

## 2024-07-05 LAB — BACTERIA UR CULT: ABNORMAL

## 2024-07-10 ENCOUNTER — HOSPITAL ENCOUNTER (OUTPATIENT)
Dept: RADIOLOGY | Facility: HOSPITAL | Age: 64
Discharge: HOME | End: 2024-07-10
Attending: FAMILY MEDICINE
Payer: MEDICARE

## 2024-07-10 DIAGNOSIS — Z78.0 MENOPAUSE: ICD-10-CM

## 2024-07-10 DIAGNOSIS — Z12.31 ENCOUNTER FOR SCREENING MAMMOGRAM FOR MALIGNANT NEOPLASM OF BREAST: ICD-10-CM

## 2024-07-10 DIAGNOSIS — Z13.820 OSTEOPOROSIS SCREENING: ICD-10-CM

## 2024-07-10 PROCEDURE — 77067 SCR MAMMO BI INCL CAD: CPT | Mod: 26,,, | Performed by: RADIOLOGY

## 2024-07-10 PROCEDURE — 77080 DXA BONE DENSITY AXIAL: CPT | Mod: 26,,, | Performed by: RADIOLOGY

## 2024-07-10 PROCEDURE — 77080 DXA BONE DENSITY AXIAL: CPT | Mod: TC,PO

## 2024-07-10 PROCEDURE — 77063 BREAST TOMOSYNTHESIS BI: CPT | Mod: 26,,, | Performed by: RADIOLOGY

## 2024-07-10 PROCEDURE — 77067 SCR MAMMO BI INCL CAD: CPT | Mod: TC,PO

## 2024-07-26 ENCOUNTER — TELEPHONE (OUTPATIENT)
Dept: PULMONOLOGY | Facility: CLINIC | Age: 64
End: 2024-07-26
Payer: MEDICARE

## 2024-08-20 ENCOUNTER — OFFICE VISIT (OUTPATIENT)
Dept: URGENT CARE | Facility: CLINIC | Age: 64
End: 2024-08-20
Payer: MEDICARE

## 2024-08-20 VITALS
RESPIRATION RATE: 18 BRPM | OXYGEN SATURATION: 96 % | SYSTOLIC BLOOD PRESSURE: 92 MMHG | TEMPERATURE: 98 F | HEART RATE: 75 BPM | BODY MASS INDEX: 26.66 KG/M2 | HEIGHT: 65 IN | DIASTOLIC BLOOD PRESSURE: 51 MMHG | WEIGHT: 160 LBS

## 2024-08-20 DIAGNOSIS — N39.0 URINARY TRACT INFECTION WITHOUT HEMATURIA, SITE UNSPECIFIED: Primary | ICD-10-CM

## 2024-08-20 DIAGNOSIS — R35.0 URINARY FREQUENCY: ICD-10-CM

## 2024-08-20 LAB
BILIRUBIN, UA POC OHS: NEGATIVE
BLOOD, UA POC OHS: ABNORMAL
CLARITY, UA POC OHS: CLEAR
COLOR, UA POC OHS: ABNORMAL
GLUCOSE, UA POC OHS: 100
KETONES, UA POC OHS: NEGATIVE
LEUKOCYTES, UA POC OHS: ABNORMAL
NITRITE, UA POC OHS: POSITIVE
PH, UA POC OHS: 5.5
PROTEIN, UA POC OHS: 100
SPECIFIC GRAVITY, UA POC OHS: 1.01
UROBILINOGEN, UA POC OHS: 1

## 2024-08-20 PROCEDURE — 81003 URINALYSIS AUTO W/O SCOPE: CPT | Mod: QW,S$GLB,, | Performed by: NURSE PRACTITIONER

## 2024-08-20 PROCEDURE — 99214 OFFICE O/P EST MOD 30 MIN: CPT | Mod: S$GLB,,, | Performed by: NURSE PRACTITIONER

## 2024-08-20 RX ORDER — NITROFURANTOIN 25; 75 MG/1; MG/1
100 CAPSULE ORAL 2 TIMES DAILY
Qty: 10 CAPSULE | Refills: 0 | Status: SHIPPED | OUTPATIENT
Start: 2024-08-20 | End: 2024-08-25

## 2024-08-20 RX ORDER — FLUCONAZOLE 150 MG/1
150 TABLET ORAL DAILY
Qty: 1 TABLET | Refills: 0 | Status: SHIPPED | OUTPATIENT
Start: 2024-08-20 | End: 2024-08-21

## 2024-08-20 RX ORDER — PHENAZOPYRIDINE HYDROCHLORIDE 100 MG/1
100 TABLET, FILM COATED ORAL EVERY 8 HOURS PRN
Qty: 6 TABLET | Refills: 0 | Status: SHIPPED | OUTPATIENT
Start: 2024-08-20 | End: 2024-08-22

## 2024-08-20 NOTE — PROGRESS NOTES
"Subjective:      Patient ID: Valdez Salazar is a 64 y.o. female.    Vitals:  height is 5' 5" (1.651 m) and weight is 72.6 kg (160 lb). Her oral temperature is 98.2 °F (36.8 °C). Her blood pressure is 92/51 (abnormal) and her pulse is 75. Her respiration is 18 and oxygen saturation is 96%.     Chief Complaint: Urinary Frequency    64 y.o. female who presents today with a chief complaint of urinary frequency and urgency since last night.    Urinary Frequency   This is a recurrent problem. The current episode started gradual onset. The problem occurs every urination. The problem has been gradually worsening. The quality of the pain is described as burning. The pain is at a severity of 0/10. The patient is experiencing no pain. There has been no fever. There is No history of pyelonephritis. Associated symptoms include frequency and urgency. Associated symptoms comments: Dysuria and swelling. Treatments tried: Pyridium. The treatment provided mild relief. Her past medical history is significant for recurrent UTIs.       Genitourinary:  Positive for frequency and urgency.      Objective:     Physical Exam   Constitutional: She is oriented to person, place, and time.  Non-toxic appearance. She does not appear ill. No distress. normal  HENT:   Head: Normocephalic and atraumatic.   Mouth/Throat: Mucous membranes are moist. Oropharynx is clear.   Eyes: Conjunctivae are normal. Extraocular movement intact   Neck: Neck supple. No neck rigidity present.   Cardiovascular: Normal rate, regular rhythm, normal heart sounds and normal pulses.   Pulmonary/Chest: Effort normal and breath sounds normal.   Abdominal: Normal appearance and bowel sounds are normal. She exhibits no distension. Soft. flat abdomen There is no left CVA tenderness and no right CVA tenderness.   Musculoskeletal: Normal range of motion.         General: Normal range of motion.      Cervical back: She exhibits no tenderness.   Lymphadenopathy:     She has no " cervical adenopathy.   Neurological: She is alert and oriented to person, place, and time.   Skin: Skin is warm, dry and no rash.   Psychiatric: Her behavior is normal.   Vitals reviewed.      Assessment:     1. Urinary tract infection without hematuria, site unspecified    2. Urinary frequency        Plan:       Urinary tract infection without hematuria, site unspecified  -     nitrofurantoin, macrocrystal-monohydrate, (MACROBID) 100 MG capsule; Take 1 capsule (100 mg total) by mouth 2 (two) times daily. for 5 days  Dispense: 10 capsule; Refill: 0  -     fluconazole (DIFLUCAN) 150 MG Tab; Take 1 tablet (150 mg total) by mouth once daily. for 1 day  Dispense: 1 tablet; Refill: 0  -     phenazopyridine (PYRIDIUM) 100 MG tablet; Take 1 tablet (100 mg total) by mouth every 8 (eight) hours as needed for Pain.  Dispense: 6 tablet; Refill: 0    Urinary frequency  -     POCT Urinalysis(Instrument)      INSTRUCTIONS  Meds as prescribed. Follow up as advised.

## 2024-12-10 ENCOUNTER — OFFICE VISIT (OUTPATIENT)
Dept: PULMONOLOGY | Facility: CLINIC | Age: 64
End: 2024-12-10
Payer: MEDICARE

## 2024-12-10 VITALS
WEIGHT: 177 LBS | SYSTOLIC BLOOD PRESSURE: 118 MMHG | DIASTOLIC BLOOD PRESSURE: 78 MMHG | HEART RATE: 68 BPM | BODY MASS INDEX: 29.45 KG/M2 | OXYGEN SATURATION: 97 %

## 2024-12-10 DIAGNOSIS — R05.3 CHRONIC COUGH: ICD-10-CM

## 2024-12-10 DIAGNOSIS — J45.40 MODERATE PERSISTENT ASTHMA, UNSPECIFIED WHETHER COMPLICATED: Primary | ICD-10-CM

## 2024-12-10 NOTE — PROGRESS NOTES
"  Office Visit    Patient Name: Valdez Salazar  MRN: [id number]  : 1960      Reason for visit: Asthma    HPI:     2018 - Pt with h/o asthma ( has seen Dr Bill in past), has trouble with dyspnea.  Has methacholine challenge test which was "positive".  Was on singulair and BREO, still had difficulties and "thick mucus".  Has trouble when reclining.  Has seen GI to evaluate for reflux ( had esophageal "stretched").  Was referred to Dr Kelly for evaluation and now here to establish care.  Hutchins also seen Dr Hart for ENT evaluation.  Had medication changes done by dr Kelly and feels better overall.    2018 - HAs been doing fine until last few days then she had some waking episodes.  This AM had some increased chest tightness and has used rescue inhaler more.  Has had some dysphagia and saw Dr Marroquin and was started on diflucan.  Feels better with regards with this.  Has not noted wheezing but does feel tight.    12/3/2018 - Here for follow up to have left knee replacement at [facility] this Friday, breathing is ok, has some cough and mucus.  After last vii she developed laryngitis which has yet to clear (? If related to her Symbicort) - she is to see Dr Peterson tomorrow AM.    Preoperative Pulmonary Clearance    Pt was seen for preoperative clearance from a pulmonary standpoint for planned left polly replacement.  The risks of the procedure have been discussed with the patient including the risk of prolonged ventilatory support/difficulty weaning from ventilator, post-procedure pneumonia, post-procedure respiratory failure and DVT/pulmonary embolism.  The pt is currently stable from their respiratory status and they are cleared for the planned procedure at increased risk.  The risk should not be considered prohibitive.  If you have any questions please contact me.  This clearance is pending evaluation by ENT for persistent hoarseness.    2019 - Here for follow up, doing better overall.  Has seen " "ENT and was found to have a vocal cord nodule, is being treated for reflux.  Still with significant vocal issues.  Mucus has been better.  Feels that asthma control is better and cough is much better than where it was.    5/28/2019 - Here for follow up.  Asthma control has better.  Hoarseness has been better - she has found that having a small cocktail daily had helped with her symptoms.  She tells me that her vocal cord nodule is "better".  Cough is much better. No issues with her medications but does have some concerns that her vocal issues may be related to her inhalers.    7/25/2019 - Was in ER 7/6 with pleurisy (ER note reviewed) - had CTA - negative but had finding of gallstones.   Continues with URI symptoms, post nasal drip.  Has some right arm discomfort (some soreness to right deltoid muscle).  Has some mucus at times which is clear.  Has not noted reflux symptoms (still taking meds).  Asthma control has been OK overall.  Gave her Dr Comer's     12/3/2019 - Here for follow up, since last visit she had cholecystectomy, found to have thyroid nodule and was told that it was benign.  Asthma control has been good overall - does note some cough after taking her symbicort at night.  Has had a head cold recently - better now.    6/4/2020 - Here for follow up, overall asthma control has been OK.  She has no known exposure to corona virus and has been practicing social distancing.  Pt asthma is currently stable with no increases in SOB, KAHN or wheezing.  There has been no increase in use of  rescue medications.  Have reviewed medications with pt including the roles of rescue and controlling medications.  All questions answered.  Pt reports no problems with technique with inhalers.  We discussed the possibility of de-escalation of therapy if asthma control remains stable.  Does have some occasional thick mucus.    11/11/2020 - Here for follow up, overall has been doing well, some increasede symptoms with weather " changes.  Pt asthma is currently stable with no increases in SOB, KAHN or wheezing.  There has been no increase in use of  rescue medications.  Have reviewed medications with pt including the roles of rescue and controlling medications.  All questions answered.  Pt reports no problems with technique with inhalers.  We discussed the possibility of de-escalation of therapy if asthma control remains stable.  Patient has no known corona virus exposures and has been practicing social distancing.  We have discussed the virus and precautions and all questions have been answered    5/13/2021 - Here for follow up, Pt asthma is currently stable with no increases in SOB, KAHN or wheezing.  There has been no increase in use of  rescue medications.  Have reviewed medications with pt including the roles of rescue and controlling medications.  All questions answered.  Pt reports no problems with technique with inhalers.  We discussed the possibility of de-escalation of therapy if asthma control remains stable.  Did have some trouble a few weeks ago when the weather was bad but it settled down.  Patient has no known recent corona virus exposures and has been practicing social distancing.  We have discussed the virus and precautions and all questions have been answered.  She had Covid vaccine (Pfizer) and did have some neck/back pain about 4 days later.  In December her  and daughter had Covid but she was tested and was negative.  .  12/15/2021 - Here for follow up, Pt asthma is currently stable with no increases in SOB, KAHN or wheezing.  There has been no increase in use of  rescue medications.  Have reviewed medications with pt including the roles of rescue and controlling medications.  All questions answered.  Pt reports no problems with technique with inhalers.  We discussed the possibility of de-escalation of therapy if asthma control remains stable.  Overall doing well.  Currently living in [place] and planning on  building there.  She has lost > 50 # on her current diet.  Patient has no known corona virus exposures and has been practicing social distancing.  We have discussed the virus and precautions and all questions have been answered.  She has had Covid vaccine.  Has started baking again and has some occasional cough.    7/6/2022 - Here for follow up,  Has lost 80# on her diet (OPTAVIA).  Asthma has been doing well, had one episode in May.  Pt asthma is currently stable with no increases in SOB, KAHN or wheezing.  There has been no increase in use of  rescue medications.  Have reviewed medications with pt including the roles of rescue and controlling medications.  All questions answered.  Pt reports no problems with technique with inhalers.  We discussed the possibility of de-escalation of therapy if asthma control remains stable.     12/22/2022 - Here for follow up,  did have Covid and after that had an episode of bronchitis (treated through Urgent Care), better but still with some cough and mucus and chest tightness.  Going to [place] for [place].  No fever, chills, sweats.  Will order prednisone course.    6/22/2023 - Here for follow up, Pt asthma is currently stable with no increases in SOB, KAHN or wheezing.  There has been no increase in use of  rescue medications.  Have reviewed medications with pt including the roles of rescue and controlling medications.  All questions answered.  Pt reports no problems with technique with inhalers.  We discussed the possibility of de-escalation of therapy if asthma control remains stable.  Has had some issues with bladder infections  and this is being addressed.  Has also had some rectal prolapse issues.    12/20/2023 - Here for follow up, Pt asthma is currently stable with no increases in SOB, KAHN or wheezing.  There has been no increase in use of  rescue medications.  Have reviewed medications with pt including the roles of rescue and controlling medications.  All  questions answered.  Pt reports no problems with technique with inhalers.  We discussed the possibility of de-escalation of therapy if asthma control remains stable  She is working out with a  and is gaining muscle mass.      6/20/2023 - Here for follow up, Pt asthma is currently stable with no increases in SOB, KAHN or wheezing.  There has been no increase in use of  rescue medications.  Have reviewed medications with pt including the roles of rescue and controlling medications.  All questions answered.  Pt reports no problems with technique with inhalers.  We discussed the possibility of de-escalation of therapy if asthma control remains stable.  She had a run in with her cat and had some wounds and she is on antibiotics.    12/10/2024 - Here for follow up visit.  Patient's asthma is currently stable with no increases in SOB, KAHN or wheezing.  There has been no increase in use of  rescue medications.  Have reviewed medications with pt including the roles of rescue and controlling medications.  All questions answered.  Pt reports no problems with technique with inhalers.  We discussed the possibility of de-escalation of therapy if asthma control remains stable.  Will consider biologic agents if appropriate.  She has gained some weight (has a lot of stress because her sister has pancreatic cancer).          Asthma Flowsheet    Asthma -  Moderate  Persistent       Controlled    ACT - 21    Baseline PFT - FEV1- 95 %    Serum eosinophil count - 300  Serum IgE - < 2    Allergy testing - na   Desensitization - no    Therapy: ICS/LABA/LAMA +      PRN albuterol +                 Singulair                  Xolair                  Nucala                  Cinqair       Past Medical History    Past Medical History:   Diagnosis Date    Acid reflux     Allergic sinusitis     Arthritis     knees, hands    Asthma 2017    dr finn    Carpal tunnel syndrome on left     no surg yet    Gall stones 09/2019    found with lung  w/o-- numerous stones-- surg scheduled    Hoarseness     Jaw disease     occ jaw locks    Low iron     Migraines     rare now    Numbness     right hand-     Phobia     of needles-- hard to find iv's    PONV (postoperative nausea and vomiting)     Thyroid disease     trouble swallowing-- being w/o    Urinary tract infection, site not specified     Vocal cord nodules        Past Surgical History    Past Surgical History:   Procedure Laterality Date    ADENOIDECTOMY      CARPAL TUNNEL RELEASE Right 2014     SECTION      COLONOSCOPY  2013    ELBOW SURGERY Left     and hardware removal    HYSTERECTOMY      JOINT REPLACEMENT  2018    LAPAROSCOPIC CHOLECYSTECTOMY N/A 10/25/2019    Procedure: CHOLECYSTECTOMY, LAPAROSCOPIC;  Surgeon: Douglas Quiroz III, MD;  Location: [facility] OR;  Service: General;  Laterality: N/A;    REPAIR OF RECTOCELE      TONSILLECTOMY      TOTAL KNEE ARTHROPLASTY Left 2018    Procedure: ARTHROPLASTY, KNEE, TOTAL;  Surgeon: Claude S. Williams IV, MD;  Location: [facility];  Service: Orthopedics;  Laterality: Left;       Medications      Current Outpatient Medications:     albuterol (VENTOLIN HFA) 90 mcg/actuation inhaler, Inhale 2 puffs into the lungs every 6 (six) hours as needed for Wheezing. Rescue, Disp: 18 g, Rfl: 11    phenazopyridine HCl (PYRIDIUM ORAL), Take by mouth as needed., Disp: , Rfl:     SYMBICORT 160-4.5 mcg/actuation HFAA, Inhale 2 puffs into the lungs 2 (two) times daily., Disp: 10.2 g, Rfl: 11    valACYclovir (VALTREX) 1000 MG tablet, Take 1 tablet (1,000 mg total) by mouth once daily., Disp: 90 tablet, Rfl: 3    estrogens, conjugated, (PREMARIN) 0.625 MG tablet, Take 1 tablet (0.625 mg total) by mouth once daily. (Patient not taking: Reported on 12/10/2024), Disp: 30 tablet, Rfl: 11    nitrofurantoin, macrocrystal-monohydrate, (MACROBID) 100 MG capsule, Take 1 capsule (100 mg total) by mouth 2 (two) times daily. (Patient not taking: Reported on 12/10/2024), Disp:  10 capsule, Rfl: 0    Allergies    Review of patient's allergies indicates:   Allergen Reactions    Codeine Nausea And Vomiting    Stadol [butorphanol tartrate] Shortness Of Breath     Pt reports stops breathing    Wiley-dur Shortness Of Breath     Stops breathing    Morphine Nausea And Vomiting     Severe.      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   HENT:  Positive for congestion. Negative for nosebleeds.         + hoarseness   Eyes:  Negative for pain.   Respiratory:  Positive for cough. Negative for hemoptysis, sputum production, shortness of breath, wheezing and stridor.    Cardiovascular:  Negative for chest pain, palpitations, orthopnea, claudication, leg swelling and PND.   Gastrointestinal:  Positive for heartburn. Negative for abdominal pain, blood in stool, constipation, diarrhea, melena, nausea and vomiting.   Genitourinary:  Negative for dysuria, flank pain, frequency, hematuria and urgency.   Musculoskeletal:  Negative for falls and myalgias.   Skin:  Negative for itching and rash.   Neurological:  Negative for sensory change, focal weakness and weakness.   Psychiatric/Behavioral:  Negative for depression. The patient is not nervous/anxious.        Physical Exam    Vitals:     12/10/24 1116   BP: 118/78   BP Location: Right arm   Patient Position: Sitting   Pulse: 68   SpO2: 97%   Weight: 80.3 kg (177 lb)       Physical Exam  Vitals and nursing note reviewed.   Constitutional:       General: She is not in acute distress.     Appearance: She is well-developed. She is not diaphoretic.   HENT:      Head: Normocephalic and atraumatic.      Right Ear: External ear normal.      Left Ear: External ear normal.      Nose: Nose normal.   Eyes:      Conjunctiva/sclera: Conjunctivae normal.      Pupils: Pupils are equal, round, and reactive to light.   Neck:      Thyroid: No thyromegaly.      Vascular: No JVD.      Trachea: No tracheal deviation.   Cardiovascular:      Rate and Rhythm: Normal rate and regular rhythm.      Heart sounds: Normal heart sounds. No murmur heard.     No friction rub. No gallop.   Pulmonary:      Effort: Pulmonary effort is normal. No respiratory distress.      Breath sounds: Normal breath sounds. No stridor. No wheezing or rales.   Chest:      Chest wall: No tenderness.   Abdominal:      General: Bowel sounds are normal. There is no distension.      Palpations: Abdomen is soft.      Tenderness: There is no abdominal tenderness.   Musculoskeletal:         General: No tenderness. Normal range of motion.      Cervical back: Normal range of motion and neck supple.   Lymphadenopathy:      Cervical: No cervical adenopathy.   Skin:     General: Skin is warm and dry.   Neurological:      Mental Status: She is alert and oriented to person, place, and time.      Cranial Nerves: No cranial nerve deficit.   Psychiatric:         Behavior: Behavior normal.         Labs    Lab Results   Component Value Date    WBC 5.55 02/27/2024    HGB 12.6 02/27/2024    HCT 40.1 02/27/2024     02/27/2024       Sodium   Date Value Ref Range Status   02/27/2024 143 136 - 145 mmol/L Final     Potassium   Date Value Ref Range Status   02/27/2024 3.9 3.5 - 5.1 mmol/L Final     Chloride   Date Value  Ref Range Status   02/27/2024 109 95 - 110 mmol/L Final     CO2   Date Value Ref Range Status   02/27/2024 29 23 - 29 mmol/L Final     Glucose   Date Value Ref Range Status   02/27/2024 101 70 - 110 mg/dL Final     BUN   Date Value Ref Range Status   02/27/2024 19 8 - 23 mg/dL Final     Creatinine   Date Value Ref Range Status   02/27/2024 0.6 0.5 - 1.4 mg/dL Final   07/29/2013 0.7 0.5 - 1.4 mg/dL Final     Calcium   Date Value Ref Range Status   02/27/2024 9.6 8.7 - 10.5 mg/dL Final   07/29/2013 9.1 8.7 - 10.5 mg/dL Final     Total Protein   Date Value Ref Range Status   02/27/2024 6.6 6.0 - 8.4 g/dL Final     Albumin   Date Value Ref Range Status   02/27/2024 4.2 3.5 - 5.2 g/dL Final     Total Bilirubin   Date Value Ref Range Status   02/27/2024 0.6 0.1 - 1.0 mg/dL Final     Comment:     For infants and newborns, interpretation of results should be based  on gestational age, weight and in agreement with clinical  observations.    Premature Infant recommended reference ranges:  Up to 24 hours.............<8.0 mg/dL  Up to 48 hours............<12.0 mg/dL  3-5 days..................<15.0 mg/dL  6-29 days.................<15.0 mg/dL       Alkaline Phosphatase   Date Value Ref Range Status   02/27/2024 57 55 - 135 U/L Final   07/29/2013 79 55 - 135 U/L Final     AST   Date Value Ref Range Status   02/27/2024 14 10 - 40 U/L Final   07/29/2013 17 10 - 40 U/L Final     ALT   Date Value Ref Range Status   02/27/2024 16 10 - 44 U/L Final     Anion Gap   Date Value Ref Range Status   02/27/2024 5 (L) 8 - 16 mmol/L Final   07/29/2013 13 5 - 15 meq/L Final       Xrays        Impression/Plan    Problem List Items Addressed This Visit          ENT    Chronic rhinitis  stable       Pulmonary    Chronic cough  multifactorial  stable       GI    Laryngopharyngeal reflux (LPR)   being treated          Other Visit Diagnoses       Asthma, unspecified asthma severity, unspecified whether complicated, unspecified whether  persistent  Continue present medications.  Will refill medications as needed.  Instructed patient to contact us with any issues concerning their medications (cost, reactions, etc.).  Have discussed with patient about inciting conditions which may exacerbate their disease.  We did discuss possible new therapies or de-escalation of therapy (if appropriate).  Discussed the possibility of looking into alternate therapies and/or steroid sparing options  Discussed whether further evaluation of allergies would be warranted  Discussed whether reflux could be playing a role in their symptoms  All questions answered  RTC 6 months  Patient instructed that they are to call if symptoms change or new issues develop prior to their next visit.  Has had mild exacerbation related to infection -   Will order short course steroids and follow      Vitamin D deficiency  being replaced    Laryngitis  better    Allergies  sees Dr Robin Corbin MD

## (undated) DEVICE — SOLUTION NACL 0.9% 3000ML

## (undated) DEVICE — PRESSURIZER BN CEMENT NOZZLE

## (undated) DEVICE — SLEEVE TROCAR 5MMX100MM  CTS02

## (undated) DEVICE — KIT TOTAL HIP OPTIVAC

## (undated) DEVICE — UNDERGLOVES BIOGEL PI SIZE 8

## (undated) DEVICE — TUBING INSUFFLATION 10FT

## (undated) DEVICE — GAUZE SPONGE 4X4 12PLY

## (undated) DEVICE — PAD CAST SPECIALIST STRL 6

## (undated) DEVICE — PAD UNDERPAD 30X30

## (undated) DEVICE — DRESSING AQUACEL AG 3.5X10IN

## (undated) DEVICE — SEE MEDLINE ITEM 146231

## (undated) DEVICE — SEE MEDLINE ITEM 146271

## (undated) DEVICE — COVER BACK TABLE 72X21

## (undated) DEVICE — HOOD T-5 TEAR AWAY STERILE

## (undated) DEVICE — DRESSING COVER AQUACEL AG SURG

## (undated) DEVICE — SUT STRATAFIX PDO 1 CT-1

## (undated) DEVICE — GLOVE BIOGEL SKINSENSE PI 6.5

## (undated) DEVICE — PULSAVAC ZIMMER

## (undated) DEVICE — BLADE PRECIS FALC OSC TIP SAW

## (undated) DEVICE — SUT VICRYL PLUS 0 CT1 36IN

## (undated) DEVICE — DRESSING MEPORE 2.5 X 3   670800

## (undated) DEVICE — SOL 9P NACL IRR PIC IL

## (undated) DEVICE — TOURNIQUET SB QC SP 34X4IN

## (undated) DEVICE — BLADE SURG STAINLESS STEEL #10

## (undated) DEVICE — KIT URINE METER 350ML STAT LOC

## (undated) DEVICE — PAD COLD THERAPY KNEE WRAP ON

## (undated) DEVICE — DRESSING GAUZE 6PLY 4X4

## (undated) DEVICE — STERISTRIP 1/2 R1547

## (undated) DEVICE — TROCAR BALL. BLNT HASSON C0R47

## (undated) DEVICE — SCISSORS 5MM APPLIED MEDICAL   CB030

## (undated) DEVICE — SUTURE ETHIBOND 0 MO-6 18 CX45D

## (undated) DEVICE — SOLUTION IRRI H2O BOTTLE 1000ML

## (undated) DEVICE — ELECTRODE REM PLYHSV RETURN 9

## (undated) DEVICE — SOLUTION IRRI NS BOTTLE 1000ML R5200-01

## (undated) DEVICE — GLOVE BIOGEL SKINSENSE PI 7.5

## (undated) DEVICE — KIT EVACUATOR 3-SPRING 1/8 DRN

## (undated) DEVICE — SUT ETHIBOND EXCEL 2 V37 30

## (undated) DEVICE — BLADE RECIP DOUBLE SIDED

## (undated) DEVICE — SUT VICRYL PLUS 2-0 CT1 18

## (undated) DEVICE — SEE MEDLINE ITEM 152523

## (undated) DEVICE — PAD KNEE POLAR XL

## (undated) DEVICE — GLOVE BIOGEL SKINSENSE PI 7.0

## (undated) DEVICE — DISSECTOR KITTNER 13300

## (undated) DEVICE — PADDING CAST SPECIALIST 6X4YD

## (undated) DEVICE — UNDERGLOVE BIOGEL PI MICRO BLUE SZ 8

## (undated) DEVICE — SWABSTICK BENZOIN S42450

## (undated) DEVICE — SOL NACL 0.9% INJ 500ML BG

## (undated) DEVICE — APPLICATOR CHLORAPREP ORN 26ML

## (undated) DEVICE — APPLIER CLIP  5MM

## (undated) DEVICE — PAD BOVIE ADULT

## (undated) DEVICE — Device

## (undated) DEVICE — CABLE MONOPOLAR 10FT DISPOSABLE

## (undated) DEVICE — DRAPE STERI U-SHAPED 47X51IN

## (undated) DEVICE — SYRINGE HYPODERMC LL 22G 1.5 ECLIPSE 30

## (undated) DEVICE — BLADE RECIP SINGLE SIDED

## (undated) DEVICE — COVER LIGHT HANDLE LB53

## (undated) DEVICE — SOL IRR NACL .9% 3000ML

## (undated) DEVICE — RETRIEVER ENDOPOUCH SPEC BAG

## (undated) DEVICE — SUTURE MONOCRYL 4-0 PS-2 27 MCP426H

## (undated) DEVICE — SYRINGE 20ML 302830

## (undated) DEVICE — SUCTION/IRRIGATOR W/TIP

## (undated) DEVICE — TROCAR ADVANCED FIXATION  CFF03

## (undated) DEVICE — SUTURE VICRYL 3-0 SH 27 VCP416H

## (undated) DEVICE — SPONGE LAP 18X18 PREWASHED

## (undated) DEVICE — TRAY GENERAL LAPAROSCOPY

## (undated) DEVICE — DRESSING XEROFORM FOIL PK 1X8

## (undated) DEVICE — GLOVE BIOGEL PI ULTRA TOUCH GRAY SZ7.5

## (undated) DEVICE — UNDERGLOVE BIOGEL PI SZ 6.5 LF

## (undated) DEVICE — STAPLER SKIN PROXIMATE WIDE